# Patient Record
Sex: MALE | Race: WHITE | NOT HISPANIC OR LATINO | Employment: FULL TIME | URBAN - METROPOLITAN AREA
[De-identification: names, ages, dates, MRNs, and addresses within clinical notes are randomized per-mention and may not be internally consistent; named-entity substitution may affect disease eponyms.]

---

## 2017-09-26 ENCOUNTER — OFFICE VISIT (OUTPATIENT)
Dept: URGENT CARE | Facility: CLINIC | Age: 42
End: 2017-09-26
Payer: MEDICAID

## 2017-09-26 VITALS
HEIGHT: 68 IN | BODY MASS INDEX: 47.74 KG/M2 | HEART RATE: 82 BPM | SYSTOLIC BLOOD PRESSURE: 119 MMHG | DIASTOLIC BLOOD PRESSURE: 73 MMHG | RESPIRATION RATE: 12 BRPM | TEMPERATURE: 99 F | OXYGEN SATURATION: 96 % | WEIGHT: 315 LBS

## 2017-09-26 DIAGNOSIS — J01.40 ACUTE PANSINUSITIS, RECURRENCE NOT SPECIFIED: ICD-10-CM

## 2017-09-26 DIAGNOSIS — S46.911A RIGHT SHOULDER STRAIN, INITIAL ENCOUNTER: Primary | ICD-10-CM

## 2017-09-26 PROCEDURE — 99214 OFFICE O/P EST MOD 30 MIN: CPT | Mod: S$GLB,,, | Performed by: FAMILY MEDICINE

## 2017-09-26 PROCEDURE — 3008F BODY MASS INDEX DOCD: CPT | Mod: S$GLB,,, | Performed by: FAMILY MEDICINE

## 2017-09-26 RX ORDER — CETIRIZINE HYDROCHLORIDE 10 MG/1
10 TABLET ORAL DAILY
Qty: 30 TABLET | Refills: 2 | Status: SHIPPED | OUTPATIENT
Start: 2017-09-26 | End: 2017-11-08

## 2017-09-26 RX ORDER — AMOXICILLIN 875 MG/1
875 TABLET, FILM COATED ORAL EVERY 12 HOURS
Qty: 20 TABLET | Refills: 0 | Status: SHIPPED | OUTPATIENT
Start: 2017-09-26 | End: 2017-10-06

## 2017-09-26 RX ORDER — NAPROXEN 500 MG/1
500 TABLET ORAL 2 TIMES DAILY WITH MEALS
Qty: 60 TABLET | Refills: 2 | Status: SHIPPED | OUTPATIENT
Start: 2017-09-26 | End: 2017-11-08

## 2017-09-26 RX ORDER — TIZANIDINE 4 MG/1
4 TABLET ORAL NIGHTLY PRN
Qty: 10 TABLET | Refills: 0 | Status: SHIPPED | OUTPATIENT
Start: 2017-09-26 | End: 2017-10-06

## 2017-09-26 RX ORDER — DEXAMETHASONE SODIUM PHOSPHATE 100 MG/10ML
10 INJECTION INTRAMUSCULAR; INTRAVENOUS ONCE
Status: COMPLETED | OUTPATIENT
Start: 2017-09-26 | End: 2017-09-26

## 2017-09-26 RX ADMIN — DEXAMETHASONE SODIUM PHOSPHATE 10 MG: 100 INJECTION INTRAMUSCULAR; INTRAVENOUS at 07:09

## 2017-09-26 NOTE — PROGRESS NOTES
"Subjective:       Patient ID: Kehinde Soriano is a 41 y.o. male.    Vitals:  height is 5' 8" (1.727 m) and weight is 149.7 kg (330 lb) (abnormal). His tympanic temperature is 98.6 °F (37 °C). His blood pressure is 119/73 and his pulse is 82. His respiration is 12 and oxygen saturation is 96%.     Chief Complaint: URI and Shoulder Injury    URI    This is a new problem. The current episode started today. The problem has been gradually worsening. There has been no fever. Associated symptoms include congestion, coughing, rhinorrhea and a sore throat. Pertinent negatives include no abdominal pain, chest pain, diarrhea, ear pain, headaches, nausea, plugged ear sensation, sinus pain, sneezing, vomiting or wheezing. He has tried nothing for the symptoms.   Shoulder Injury    The incident occurred at home. The right shoulder is affected. The incident occurred 12 to 24 hours ago. The injury mechanism was a fall. The quality of the pain is described as stabbing. The pain radiates to the right arm. The pain is at a severity of 7/10. The pain is moderate. Pertinent negatives include no chest pain, muscle weakness, numbness or tingling. The symptoms are aggravated by overhead lifting and movement. He has tried NSAIDs for the symptoms. The treatment provided no relief.     Review of Systems   Constitution: Positive for malaise/fatigue. Negative for chills and fever.   HENT: Positive for congestion, rhinorrhea and sore throat. Negative for ear pain, hoarse voice, sinus pain and sneezing.    Eyes: Negative for discharge and redness.   Cardiovascular: Negative for chest pain, dyspnea on exertion and leg swelling.   Respiratory: Positive for cough and sputum production. Negative for shortness of breath and wheezing.    Musculoskeletal: Negative for myalgias.   Gastrointestinal: Negative for abdominal pain, diarrhea, nausea and vomiting.   Neurological: Negative for headaches, numbness and tingling.   All other systems reviewed and " are negative.      Objective:      Physical Exam   Constitutional: He is oriented to person, place, and time. He appears well-developed and well-nourished.   HENT:   Head: Normocephalic and atraumatic.   Nose: Mucosal edema, rhinorrhea and sinus tenderness present. Right sinus exhibits maxillary sinus tenderness and frontal sinus tenderness. Left sinus exhibits maxillary sinus tenderness and frontal sinus tenderness.   Mouth/Throat: Oropharyngeal exudate, posterior oropharyngeal edema and posterior oropharyngeal erythema present.   Eyes: Conjunctivae and EOM are normal. Pupils are equal, round, and reactive to light.   Neck: Normal range of motion.   Cardiovascular: Normal rate, regular rhythm and normal heart sounds.    Pulmonary/Chest: Effort normal and breath sounds normal. He has no wheezes. He has no rales.   Musculoskeletal: He exhibits tenderness.        Right shoulder: He exhibits decreased range of motion, tenderness, pain and decreased strength.        Arms:  Neurological: He is alert and oriented to person, place, and time.         XRAY:   No acute displaced fracture or dislocation identified.  Assessment:       1. Right shoulder strain, initial encounter    2. Acute pansinusitis, recurrence not specified        Plan:         Right shoulder strain, initial encounter  -     X-Ray Shoulder Trauma 3 view Right; Future; Expected date: 09/26/2017  -     naproxen (NAPROSYN) 500 MG tablet; Take 1 tablet (500 mg total) by mouth 2 (two) times daily with meals.  Dispense: 60 tablet; Refill: 2  -     tizanidine (ZANAFLEX) 4 MG tablet; Take 1 tablet (4 mg total) by mouth nightly as needed.  Dispense: 10 tablet; Refill: 0    Acute pansinusitis, recurrence not specified  -     dexamethasone injection 10 mg; Inject 1 mL (10 mg total) into the muscle once.  -     amoxicillin (AMOXIL) 875 MG tablet; Take 1 tablet (875 mg total) by mouth every 12 (twelve) hours.  Dispense: 20 tablet; Refill: 0  -     cetirizine (ZYRTEC) 10  MG tablet; Take 1 tablet (10 mg total) by mouth once daily.  Dispense: 30 tablet; Refill: 2

## 2017-09-27 NOTE — PATIENT INSTRUCTIONS
Sinusitis (Antibiotic Treatment)    The sinuses are air-filled spaces within the bones of the face. They connect to the inside of the nose. Sinusitis is an inflammation of the tissue lining the sinus cavity. Sinus inflammation can occur during a cold. It can also be due to allergies to pollens and other particles in the air. Sinusitis can cause symptoms of sinus congestion and fullness. A sinus infection causes fever, headache and facial pain. There is often green or yellow drainage from the nose or into the back of the throat (post-nasal drip). You have been given antibiotics to treat this condition.  Home care:  · Take the full course of antibiotics as instructed. Do not stop taking them, even if you feel better.  · Drink plenty of water, hot tea, and other liquids. This may help thin mucus. It also may promote sinus drainage.  · Heat may help soothe painful areas of the face. Use a towel soaked in hot water. Or,  the shower and direct the hot spray onto your face. Using a vaporizer along with a menthol rub at night may also help.   · An expectorant containing guaifenesin may help thin the mucus and promote drainage from the sinuses.  · Over-the-counter decongestants may be used unless a similar medicine was prescribed. Nasal sprays work the fastest. Use one that contains phenylephrine or oxymetazoline. First blow the nose gently. Then use the spray. Do not use these medicines more often than directed on the label or symptoms may get worse. You may also use tablets containing pseudoephedrine. Avoid products that combine ingredients, because side effects may be increased. Read labels. You can also ask the pharmacist for help. (NOTE: Persons with high blood pressure should not use decongestants. They can raise blood pressure.)  · Over-the-counter antihistamines may help if allergies contributed to your sinusitis.    · Do not use nasal rinses or irrigation during an acute sinus infection, unless told to by  your health care provider. Rinsing may spread the infection to other sinuses.  · Use acetaminophen or ibuprofen to control pain, unless another pain medicine was prescribed. (If you have chronic liver or kidney disease or ever had a stomach ulcer, talk with your doctor before using these medicines. Aspirin should never be used in anyone under 18 years of age who is ill with a fever. It may cause severe liver damage.)  · Don't smoke. This can worsen symptoms.  Follow-up care  Follow up with your healthcare provider or our staff if you are not improving within the next week.  When to seek medical advice  Call your healthcare provider if any of these occur:  · Facial pain or headache becoming more severe  · Stiff neck  · Unusual drowsiness or confusion  · Swelling of the forehead or eyelids  · Vision problems, including blurred or double vision  · Fever of 100.4ºF (38ºC) or higher, or as directed by your healthcare provider  · Seizure  · Breathing problems  · Symptoms not resolving within 10 days  Date Last Reviewed: 4/13/2015  © 3726-5964 [employer]. [address] [place] [address]. All rights reserved. This information is not intended as a substitute for professional medical care. Always follow your healthcare professional's instructions.        Shoulder Sprain  A sprain is a stretching or tearing of the ligaments that hold a joint together. A sprain may take up to 8 weeks to fully heal, depending on how severe it is. Moderate to severe shoulder sprains are treated with a sling or shoulder immobilizer. Minor sprains can be treated without any special support.  Home care  The following guidelines will help you care for your injury at home:  · If a sling was given to you, leave it in place for the time advised by your healthcare provider. If you arent sure how long to wear it, ask for advice. If the sling becomes loose, adjust it so that your forearm is level with the ground. Your shoulder  should feel well supported.  · Put an ice pack on the injured area for 20 minutes every 1 to 2 hours the first day. You can make your own ice pack by putting ice cubes in a plastic bag. A bag of frozen peas or something similar works well too. Wrap the bag in a thin towel. Continue with ice packs 3 to 4 times a day for the next 2 to 3 days. Then use the pack as needed to ease pain and swelling.  · You may use acetaminophen or ibuprofen to control pain, unless another pain medicine was prescribed. If you have chronic liver or kidney disease, talk with your healthcare provider before using these medicines. Also talk with your provider if youve had a stomach ulcer or gastrointestinal bleeding.  · Shoulder joints become stiff if left in a sling for too long. You should start range of motion exercises about 7 to 10 days after the injury. Talk with your provider to find out what type of exercises to do and how soon to start.  Follow-up care  Follow up with your healthcare provider, or as advised.  Any X-rays you had today dont show any broken bones, breaks, or fractures. Sometimes fractures dont show up on the first X-ray. Bruises and sprains can sometimes hurt as much as a fracture. These injuries can take time to heal completely. If your symptoms dont improve or they get worse, talk with your provider. You may need a repeat X-ray or other treatments.  When to seek medical advice  Call your healthcare provider right away if any of these occur:  · Shoulder pain or swelling in your arm that gets worse  · Fingers become cold, blue, numb, or tingly  · Large amount of bruising of the shoulder or upper arm  · Fever or chills  Date Last Reviewed: 8/1/2016  © 6921-0407 [facility]. [address] [place] [address], [place] [address]. All rights reserved. This information is not intended as a substitute for professional medical care. Always follow your healthcare professional's instructions.

## 2017-11-08 ENCOUNTER — OFFICE VISIT (OUTPATIENT)
Dept: URGENT CARE | Facility: CLINIC | Age: 42
End: 2017-11-08
Payer: MEDICAID

## 2017-11-08 VITALS
OXYGEN SATURATION: 96 % | HEART RATE: 99 BPM | TEMPERATURE: 98 F | HEIGHT: 68 IN | WEIGHT: 315 LBS | SYSTOLIC BLOOD PRESSURE: 119 MMHG | DIASTOLIC BLOOD PRESSURE: 81 MMHG | BODY MASS INDEX: 47.74 KG/M2

## 2017-11-08 DIAGNOSIS — R09.81 SINUS CONGESTION: ICD-10-CM

## 2017-11-08 DIAGNOSIS — J06.9 UPPER RESPIRATORY TRACT INFECTION, UNSPECIFIED TYPE: Primary | ICD-10-CM

## 2017-11-08 PROCEDURE — 99213 OFFICE O/P EST LOW 20 MIN: CPT | Mod: S$GLB,,, | Performed by: FAMILY MEDICINE

## 2017-11-08 RX ORDER — AZITHROMYCIN 250 MG/1
TABLET, FILM COATED ORAL
Qty: 6 TABLET | Refills: 0 | Status: SHIPPED | OUTPATIENT
Start: 2017-11-08 | End: 2018-05-06

## 2017-11-08 RX ORDER — DEXAMETHASONE SODIUM PHOSPHATE 100 MG/10ML
8 INJECTION INTRAMUSCULAR; INTRAVENOUS
Status: COMPLETED | OUTPATIENT
Start: 2017-11-08 | End: 2017-11-08

## 2017-11-08 RX ADMIN — DEXAMETHASONE SODIUM PHOSPHATE 8 MG: 100 INJECTION INTRAMUSCULAR; INTRAVENOUS at 07:11

## 2017-11-09 NOTE — PATIENT INSTRUCTIONS
Follow up with your doctor in a few days as needed.  Return to the urgent care or go to the ER if symptoms get worse.    Alexander Soriano MD

## 2017-11-09 NOTE — PROGRESS NOTES
"Subjective:       Patient ID: Kehinde Soriano is a 41 y.o. male.    Vitals:  height is 5' 8" (1.727 m) and weight is 149.7 kg (330 lb) (abnormal). His temperature is 97.9 °F (36.6 °C). His blood pressure is 119/81 and his pulse is 99. His oxygen saturation is 96%.     Chief Complaint: URI    URI    This is a new problem. The current episode started today. The problem has been gradually worsening. There has been no fever. Associated symptoms include congestion, coughing, ear pain, rhinorrhea and a sore throat. Pertinent negatives include no abdominal pain, chest pain, headaches, nausea or wheezing. He has tried nothing for the symptoms. The treatment provided no relief.     Review of Systems   Constitution: Positive for malaise/fatigue. Negative for chills and fever.   HENT: Positive for congestion, ear pain, hoarse voice, rhinorrhea and sore throat.    Eyes: Negative for discharge and redness.   Cardiovascular: Negative for chest pain, dyspnea on exertion and leg swelling.   Respiratory: Positive for cough and sputum production. Negative for shortness of breath and wheezing.    Musculoskeletal: Positive for myalgias.   Gastrointestinal: Negative for abdominal pain and nausea.   Neurological: Negative for headaches.   All other systems reviewed and are negative.      Objective:      Physical Exam   Constitutional: He is oriented to person, place, and time. He appears well-developed and well-nourished.   HENT:   Head: Normocephalic and atraumatic.   Right Ear: External ear normal.   Left Ear: External ear normal.   Eyes: EOM are normal. Pupils are equal, round, and reactive to light.   Neck: Normal range of motion. Neck supple. No JVD present. No tracheal deviation present. No thyromegaly present.   Cardiovascular: Normal rate, regular rhythm and normal heart sounds.  Exam reveals no gallop and no friction rub.    No murmur heard.  Pulmonary/Chest: Breath sounds normal. No respiratory distress. He has no wheezes. He " has no rales. He exhibits no tenderness.   Abdominal: Soft. Bowel sounds are normal. He exhibits no distension and no mass. There is no tenderness. There is no rebound and no guarding. No hernia.   Musculoskeletal: Normal range of motion. He exhibits no edema, tenderness or deformity.   Lymphadenopathy:     He has no cervical adenopathy.   Neurological: He is alert and oriented to person, place, and time. He displays normal reflexes. No cranial nerve deficit. He exhibits normal muscle tone. Coordination normal.   Skin: Skin is warm. Capillary refill takes less than 2 seconds. No rash noted. No erythema. No pallor.   Psychiatric: He has a normal mood and affect. His behavior is normal. Judgment and thought content normal.   Vitals reviewed.      Assessment:       1. Upper respiratory tract infection, unspecified type    2. Sinus congestion        Plan:         Upper respiratory tract infection, unspecified type    Sinus congestion  -     dexamethasone injection 8 mg; Inject 0.8 mLs (8 mg total) into the muscle one time.  -     azithromycin (Z-JJ) 250 MG tablet; Take 2 tablets by mouth x 1 for day 1 Then take 1 tablet by mouth daily for day 2 - 5  Dispense: 6 tablet; Refill: 0      Follow up with your doctor in a few days as needed.  Return to the urgent care or go to the ER if symptoms get worse.    Alexander Soriano MD

## 2017-11-15 ENCOUNTER — TELEPHONE (OUTPATIENT)
Dept: URGENT CARE | Facility: CLINIC | Age: 42
End: 2017-11-15

## 2018-01-18 ENCOUNTER — HOSPITAL ENCOUNTER (EMERGENCY)
Facility: OTHER | Age: 43
Discharge: HOME | End: 2018-01-18
Attending: EMERGENCY MEDICINE
Payer: MEDICAID

## 2018-01-18 VITALS
DIASTOLIC BLOOD PRESSURE: 67 MMHG | HEART RATE: 103 BPM | SYSTOLIC BLOOD PRESSURE: 114 MMHG | OXYGEN SATURATION: 96 % | RESPIRATION RATE: 18 BRPM | TEMPERATURE: 98 F

## 2018-01-18 DIAGNOSIS — J02.0 STREP PHARYNGITIS: Primary | ICD-10-CM

## 2018-01-18 LAB
CTP QC/QA: YES
S PYO RRNA THROAT QL PROBE: POSITIVE

## 2018-01-18 PROCEDURE — 25000003 PHARM REV CODE 250: Performed by: EMERGENCY MEDICINE

## 2018-01-18 PROCEDURE — 96372 THER/PROPH/DIAG INJ SC/IM: CPT

## 2018-01-18 PROCEDURE — 63600175 PHARM REV CODE 636 W HCPCS: Performed by: EMERGENCY MEDICINE

## 2018-01-18 PROCEDURE — 99283 EMERGENCY DEPT VISIT LOW MDM: CPT | Mod: 25

## 2018-01-18 PROCEDURE — 87880 STREP A ASSAY W/OPTIC: CPT

## 2018-01-18 RX ORDER — IBUPROFEN 800 MG/1
800 TABLET ORAL EVERY 6 HOURS PRN
Qty: 20 TABLET | Refills: 0 | Status: SHIPPED | OUTPATIENT
Start: 2018-01-18 | End: 2018-04-10

## 2018-01-18 RX ORDER — DEXAMETHASONE SODIUM PHOSPHATE 4 MG/ML
8 INJECTION, SOLUTION INTRA-ARTICULAR; INTRALESIONAL; INTRAMUSCULAR; INTRAVENOUS; SOFT TISSUE
Status: COMPLETED | OUTPATIENT
Start: 2018-01-18 | End: 2018-01-18

## 2018-01-18 RX ORDER — IBUPROFEN 400 MG/1
800 TABLET ORAL
Status: COMPLETED | OUTPATIENT
Start: 2018-01-18 | End: 2018-01-18

## 2018-01-18 RX ADMIN — DEXAMETHASONE SODIUM PHOSPHATE 8 MG: 4 INJECTION, SOLUTION INTRAMUSCULAR; INTRAVENOUS at 11:01

## 2018-01-18 RX ADMIN — IBUPROFEN 800 MG: 400 TABLET, FILM COATED ORAL at 10:01

## 2018-01-18 RX ADMIN — PENICILLIN G BENZATHINE 1.2 MILLION UNITS: 1200000 INJECTION, SUSPENSION INTRAMUSCULAR at 11:01

## 2018-01-18 NOTE — ED PROVIDER NOTES
Encounter Date: 1/18/2018       History     Chief Complaint   Patient presents with    Nasal Congestion     patient reports having nasal congestion, sinus headache and right ear painX1 day    Sinusitis    Otalgia     Chief complaint: Right ear and facial pain  42-year-old complains of pressure to the right side of his face.  Patient awoke this morning the pain to his right ear and his right throat.  No fever.  No coughing or shortness of breath.  Patient uses a sleep apnea machine.  Patient did not try anything over-the-counter prior to coming to the ER.  His pain is moderate.  There are no aggravating or alleviating factors for his symptoms.          Review of patient's allergies indicates:  No Known Allergies  Past Medical History:   Diagnosis Date    Diverticulitis of colon      Past Surgical History:   Procedure Laterality Date    ABDOMINAL SURGERY      COLON SURGERY      HERNIA REPAIR       Family History   Problem Relation Age of Onset    Family history unknown: Yes     Social History   Substance Use Topics    Smoking status: Current Every Day Smoker     Packs/day: 0.50     Years: 10.00     Types: Cigarettes    Smokeless tobacco: Never Used    Alcohol use Yes      Comment: ocassionally     Review of Systems   Constitutional: Negative for fever.   HENT: Positive for congestion, ear pain and sore throat.    Eyes: Negative for visual disturbance.   Respiratory: Negative for shortness of breath.    Cardiovascular: Negative for chest pain.   Gastrointestinal: Negative for nausea.   Genitourinary: Negative for dysuria.   Musculoskeletal: Negative for back pain.   Skin: Negative for rash.   Neurological: Negative for weakness.       Physical Exam     Initial Vitals [01/18/18 0947]   BP Pulse Resp Temp SpO2   114/67 103 18 97.9 °F (36.6 °C) 96 %      MAP       82.67         Physical Exam    Constitutional: He appears well-developed and well-nourished.   HENT:   Head: Normocephalic and atraumatic.   Right  Ear: Tympanic membrane is injected.   Mouth/Throat: Posterior oropharyngeal edema and posterior oropharyngeal erythema present. No tonsillar abscesses.   Eyes: EOM are normal. Pupils are equal, round, and reactive to light.   Neck: Neck supple.   Cardiovascular: Normal rate, regular rhythm and normal heart sounds.   Pulmonary/Chest: Breath sounds normal.   Abdominal: Soft. There is no tenderness. There is no rebound and no guarding.   Musculoskeletal: Normal range of motion.   Lymphadenopathy:        Head (right side): Submandibular adenopathy present.        Head (left side): Submandibular adenopathy present.   Neurological: He is alert and oriented to person, place, and time. No cranial nerve deficit.   Skin: Skin is warm and dry.   Psychiatric: He has a normal mood and affect.         ED Course   Procedures  Labs Reviewed   POCT RAPID STREP A             Medical Decision Making:   Initial Assessment:   42-year-old who complains of pain and pressure to the right side of his face since awakening this morning.  On exam patient has mild erythema to his tympanic membrane as well as to his posterior pharynx  ED Management:  Patient will be checked for strep,  Patient was positive for strep pharyngitis.  He was given Decadron and Bicillin in the ED and will be discharged on ibuprofen.                   ED Course      Clinical Impression:   The encounter diagnosis was Strep pharyngitis.                           Cecy Champion MD  01/18/18 [id number]

## 2018-03-11 ENCOUNTER — HOSPITAL ENCOUNTER (EMERGENCY)
Facility: OTHER | Age: 43
Discharge: HOME | End: 2018-03-11
Attending: EMERGENCY MEDICINE
Payer: MEDICAID

## 2018-03-11 VITALS
RESPIRATION RATE: 20 BRPM | TEMPERATURE: 99 F | HEIGHT: 68 IN | HEART RATE: 93 BPM | DIASTOLIC BLOOD PRESSURE: 83 MMHG | WEIGHT: 315 LBS | BODY MASS INDEX: 47.74 KG/M2 | SYSTOLIC BLOOD PRESSURE: 127 MMHG | OXYGEN SATURATION: 97 %

## 2018-03-11 DIAGNOSIS — R03.0 ELEVATED BLOOD PRESSURE READING WITHOUT DIAGNOSIS OF HYPERTENSION: ICD-10-CM

## 2018-03-11 DIAGNOSIS — S39.012A LUMBAR STRAIN, INITIAL ENCOUNTER: Primary | ICD-10-CM

## 2018-03-11 PROCEDURE — 63600175 PHARM REV CODE 636 W HCPCS: Performed by: NURSE PRACTITIONER

## 2018-03-11 PROCEDURE — 96372 THER/PROPH/DIAG INJ SC/IM: CPT

## 2018-03-11 PROCEDURE — 25000003 PHARM REV CODE 250: Performed by: NURSE PRACTITIONER

## 2018-03-11 PROCEDURE — 99284 EMERGENCY DEPT VISIT MOD MDM: CPT | Mod: 25

## 2018-03-11 RX ORDER — ONDANSETRON 4 MG/1
4 TABLET, ORALLY DISINTEGRATING ORAL
Status: COMPLETED | OUTPATIENT
Start: 2018-03-11 | End: 2018-03-11

## 2018-03-11 RX ORDER — HYDROMORPHONE HYDROCHLORIDE 2 MG/ML
INJECTION, SOLUTION INTRAMUSCULAR; INTRAVENOUS; SUBCUTANEOUS
Status: DISCONTINUED
Start: 2018-03-11 | End: 2018-03-11 | Stop reason: HOSPADM

## 2018-03-11 RX ORDER — HYDROMORPHONE HYDROCHLORIDE 1 MG/ML
1 INJECTION, SOLUTION INTRAMUSCULAR; INTRAVENOUS; SUBCUTANEOUS
Status: COMPLETED | OUTPATIENT
Start: 2018-03-11 | End: 2018-03-11

## 2018-03-11 RX ORDER — DIAZEPAM 5 MG/1
5 TABLET ORAL
Status: COMPLETED | OUTPATIENT
Start: 2018-03-11 | End: 2018-03-11

## 2018-03-11 RX ORDER — ACETAMINOPHEN AND CODEINE PHOSPHATE 300; 30 MG/1; MG/1
1 TABLET ORAL EVERY 8 HOURS PRN
Qty: 12 TABLET | Refills: 0 | Status: SHIPPED | OUTPATIENT
Start: 2018-03-11 | End: 2019-03-16

## 2018-03-11 RX ORDER — KETOROLAC TROMETHAMINE 30 MG/ML
15 INJECTION, SOLUTION INTRAMUSCULAR; INTRAVENOUS
Status: COMPLETED | OUTPATIENT
Start: 2018-03-11 | End: 2018-03-11

## 2018-03-11 RX ORDER — DIAZEPAM 5 MG/1
5 TABLET ORAL EVERY 8 HOURS PRN
Qty: 15 TABLET | Refills: 0 | Status: SHIPPED | OUTPATIENT
Start: 2018-03-11 | End: 2019-03-16

## 2018-03-11 RX ORDER — ORPHENADRINE CITRATE 30 MG/ML
60 INJECTION INTRAMUSCULAR; INTRAVENOUS
Status: COMPLETED | OUTPATIENT
Start: 2018-03-11 | End: 2018-03-11

## 2018-03-11 RX ORDER — PREDNISONE 20 MG/1
40 TABLET ORAL DAILY
Qty: 10 TABLET | Refills: 0 | Status: SHIPPED | OUTPATIENT
Start: 2018-03-11 | End: 2018-03-16

## 2018-03-11 RX ADMIN — KETOROLAC TROMETHAMINE 15 MG: 30 INJECTION, SOLUTION INTRAMUSCULAR at 06:03

## 2018-03-11 RX ADMIN — HYDROMORPHONE HYDROCHLORIDE 1 MG: 1 INJECTION, SOLUTION INTRAMUSCULAR; INTRAVENOUS; SUBCUTANEOUS at 06:03

## 2018-03-11 RX ADMIN — DIAZEPAM 5 MG: 5 TABLET ORAL at 05:03

## 2018-03-11 RX ADMIN — ONDANSETRON 4 MG: 4 TABLET, ORALLY DISINTEGRATING ORAL at 05:03

## 2018-03-11 RX ADMIN — ORPHENADRINE CITRATE 60 MG: 30 INJECTION INTRAMUSCULAR; INTRAVENOUS at 07:03

## 2018-03-11 NOTE — ED NOTES
Pt reports pain to R side  After twisting in the shower today.  Reports excruciating pain at this time 10/10.  Denies fall or injury.  Denies abdominal or chest pain.

## 2018-03-11 NOTE — ED PROVIDER NOTES
"Encounter Date: 3/11/2018       History     Chief Complaint   Patient presents with    Back Pain     Pt states, " I was getting out of the shower and my back started hurting today."     The history is provided by the patient. No  was used.   Back Pain    This is a new problem. The current episode started 1 to 2 hours ago. The problem occurs constantly. The problem has been unchanged. The pain is associated with twisting (Patient was turning while getting out of the shower and he incurred pain to the right side of his back that does not radiate). The pain is present in the lumbar spine (Right-sided). The quality of the pain is described as aching. The pain does not radiate. The pain is at a severity of 10/10. The symptoms are aggravated by twisting and certain positions. The pain is the same all the time. Pertinent negatives include no chest pain, no fever, no numbness, no weight loss, no headaches, no abdominal pain, no abdominal swelling, no bowel incontinence, no perianal numbness, no bladder incontinence, no dysuria, no pelvic pain, no leg pain, no paresthesias, no paresis, no tingling and no weakness. He has tried nothing for the symptoms. Risk factors include obesity and a sedentary lifestyle.     Review of patient's allergies indicates:  No Known Allergies  Past Medical History:   Diagnosis Date    Diverticulitis of colon      Past Surgical History:   Procedure Laterality Date    ABDOMINAL SURGERY      COLON SURGERY      HERNIA REPAIR       Family History   Problem Relation Age of Onset    Family history unknown: Yes     Social History   Substance Use Topics    Smoking status: Current Every Day Smoker     Packs/day: 0.50     Years: 10.00     Types: Cigarettes    Smokeless tobacco: Never Used    Alcohol use Yes      Comment: ocassionally     Review of Systems   Constitutional: Negative.  Negative for chills, fever and weight loss.   HENT: Negative.  Negative for congestion, sinus " pressure and sore throat.    Eyes: Negative.  Negative for pain and discharge.   Respiratory: Negative.  Negative for cough.    Cardiovascular: Negative.  Negative for chest pain.   Gastrointestinal: Negative.  Negative for abdominal pain, bowel incontinence, constipation, diarrhea, nausea, rectal pain and vomiting.   Genitourinary: Negative.  Negative for bladder incontinence, dysuria, genital sores, pelvic pain, penile pain, scrotal swelling and testicular pain.   Musculoskeletal: Positive for back pain. Negative for gait problem, joint swelling and neck pain.   Skin: Negative.  Negative for color change and rash.   Allergic/Immunologic: Negative.    Neurological: Negative.  Negative for tingling, weakness, numbness, headaches and paresthesias.   Psychiatric/Behavioral: Negative.  Negative for behavioral problems, self-injury and suicidal ideas. The patient is not hyperactive.    All other systems reviewed and are negative.      Physical Exam     Initial Vitals [03/11/18 1659]   BP Pulse Resp Temp SpO2   (!) 170/111 96 16 98 °F (36.7 °C) 99 %      MAP       130.67         Physical Exam    Nursing note and vitals reviewed.  Constitutional: He appears well-developed and well-nourished. He is not diaphoretic.  Non-toxic appearance. He does not appear ill. No distress.   obese   HENT:   Head: Normocephalic and atraumatic.   Eyes: Conjunctivae are normal.   Neck: Normal range of motion.   Cardiovascular: Normal rate, regular rhythm, normal heart sounds and intact distal pulses. Exam reveals no gallop and no friction rub.    No murmur heard.  Pulmonary/Chest: Breath sounds normal. No respiratory distress. He has no wheezes. He has no rhonchi. He has no rales. He exhibits no tenderness.   Abdominal: Soft. Bowel sounds are normal. He exhibits no distension and no mass. There is no tenderness. There is no rebound and no guarding.   Musculoskeletal: Normal range of motion.   5/5 motor strength BLE with intact  sensation  Negative SLR BLE  Normal heel/toe BLE  2+ reflexes BLE  No bony tenderness; Spasm noted  No s/s cauda equina     Neurological: He is alert and oriented to person, place, and time.   Skin: Skin is warm and dry. Capillary refill takes less than 2 seconds. No rash noted.   Psychiatric: He has a normal mood and affect. His behavior is normal. Judgment and thought content normal.         ED Course   Procedures  Labs Reviewed - No data to display     Imaging Results          CT Renal Stone Study ABD Pelvis WO (Final result)  Result time 03/11/18 19:56:48    Final result by Anu Neal MD (03/11/18 19:56:48)                 Impression:      1.  Hepatic steatosis, correlation with LFTs recommended.    2.  Postsurgical changes of the large bowel without obstruction.    3.  Several additional findings above.        Electronically signed by: ANU NEAL MD  Date:     03/11/18  Time:    19:56              Narrative:    Clinical History:     Technique: Axial images of the abdomen and pelvis were obtained at 5-mm intervals without the administration of contrast following the renal stone study protocol.    Comparison:Flank pain    Findings:None    Images of the lower thorax are remarkable for a calcified granuloma within the right lower lobe, punctate.    The liver is diffusely hypoattenuating suggesting steatosis, correlation with LFTs recommended.  There is sparing about the gallbladder fossa.  The spleen, pancreas and right adrenal gland are grossly unremarkable.  There is nonspecific low attenuating thickening of the left adrenal gland.  The gallbladder is decompressed limiting evaluation.  There is no biliary dilation or ascites.  There is a minimal hiatal hernia.  Scattered shotty periaortic and paracaval lymph nodes are noted.    There is no hydronephrosis or nephrolithiasis.  There is cortical scarring involving the interpole region of the left kidney versus congenital partial fusion.  There is no  hydronephrosis or nephrolithiasis.  The bilateral ureters are unremarkable without calculi seen.  The urinary bladder is unremarkable.  The prostate is not enlarged.    Postsurgical changes are noted of partial colectomy, anastomosis is patent.  The terminal ileum is unremarkable.  The appendix is not confidently identified, no pericecal inflammation.  The small bowel is grossly unremarkable noting postsurgical changes abutting the anterior abdominal wall on the left, with adjacent small bowel loops in the region.  No focal organized pelvic fluid collection.    No focal osseous destructive process.    No significant inguinal lymphadenopathy.  There are a few scattered nonenlarged mesenteric lymph nodes.  Postsurgical changes are noted of the anterior abdominal wall.  There is a small right abdominal wall hernia, containing fat, without inflammation.                             CT Lumbar Spine Without Contrast (Final result)  Result time 03/11/18 20:13:46    Final result by Jr Parker MD (03/11/18 20:13:46)                 Impression:       1.  No evidence of acute fracture or listhesis of the lumbar spine.    2. Mild multilevel degenerative lumbar spine.  Outpatient MRI lumbar spine may be obtained for assessment.    3.  Postoperative changes in the sigmoid colon.                  Electronically signed by: JR PARKER MD  Date:     03/11/18  Time:    20:13              Narrative:    Exam: [id number]  03/11/18  19:31:16 [id number] ([facility]) : CT LUMBAR SPINE WITHOUT CONTRAST    Technique:    Axial CT scan of the lumbar spine was performed without intravenous contrast. Coronal and Sagittal Reformats were also obtained.     Comparison:    CT scan of the abdomen and pelvis dated 03/11/2018    Findings:      There is straightening of the normal lumbar lordosis.  There are 5 lumbar-type vertebral bodies.  There is a Schmorl's node along the superior endplate of L1.  The reminder of the vertebral body heights are maintained.   There is hypertrophy of the posterior elements.  There is disc desiccation at the T12-L1, L4-5, and L5-S1 levels.  The reminder of intervertebral disc spaces are unremarkable.  There are anterior osteophytosis at the T. 11-T12 and T12-L1 levels.  The sacroiliac joints are within normal limits.  There is no evidence of acute fracture or listhesis.  Evaluation of individual disc levels reveals mild spinal canal and neural foraminal narrowing.  No evidence of mass effect identified in the spinal canal.    The paraspinal soft tissues are within normal limits.  There are postoperative changes in the sigmoid colon.  No evidence of lymphadenopathy in the retroperitoneum.                                 Medical Decision Making:   Initial Assessment:   Lumbar strain  Differential Diagnosis:   Lumbar radiculopathy, kidney stone, spinal abnormality, retroperitoneal abnormality, pyelo  Clinical Tests:   Radiological Study: Ordered and Reviewed  ED Management:  Dilaudid IM and Valium by mouth given in the ER.  Patient's repeat blood pressure is 127/83.  Patient was also given Toradol IM.  Upon reassessment patient's pain is down to a 6 out of 10.  Patient will be discharged home on Valium, Tylenol 3 and prednisone with instructions to rest, refrain from strenuous activity, use over-the-counter icy hot as needed, use over-the-counter heating pad as needed, follow-up with his primary care provider in 1-2 days for MRI referral per radiologist recommendations, and return to the ER as needed if symptoms worsen or fail to improve.  Patient verbalized an understanding of discharge instructions and treatment plan.                      Clinical Impression:   The primary encounter diagnosis was Lumbar strain, initial encounter. A diagnosis of Elevated blood pressure reading without diagnosis of hypertension was also pertinent to this visit.                           Toussaint Battley III, FNP  03/11/18 2027

## 2018-03-15 ENCOUNTER — OFFICE VISIT (OUTPATIENT)
Dept: URGENT CARE | Facility: CLINIC | Age: 43
End: 2018-03-15
Payer: MEDICAID

## 2018-03-15 VITALS
DIASTOLIC BLOOD PRESSURE: 84 MMHG | SYSTOLIC BLOOD PRESSURE: 130 MMHG | HEART RATE: 100 BPM | BODY MASS INDEX: 50.18 KG/M2 | WEIGHT: 315 LBS | TEMPERATURE: 98 F | OXYGEN SATURATION: 98 %

## 2018-03-15 DIAGNOSIS — R73.9 HYPERGLYCEMIA: ICD-10-CM

## 2018-03-15 DIAGNOSIS — E11.9 DIABETES MELLITUS, NEW ONSET: Primary | ICD-10-CM

## 2018-03-15 DIAGNOSIS — Z76.89 ENCOUNTER TO ESTABLISH CARE: ICD-10-CM

## 2018-03-15 LAB
BILIRUB UR QL STRIP: NEGATIVE
GLUCOSE SERPL-MCNC: 257 MG/DL (ref 70–110)
GLUCOSE UR QL STRIP: POSITIVE
KETONES UR QL STRIP: POSITIVE
LEUKOCYTE ESTERASE UR QL STRIP: NEGATIVE
PH, POC UA: 6 (ref 5–8)
POC BLOOD, URINE: NEGATIVE
POC NITRATES, URINE: NEGATIVE
PROT UR QL STRIP: NEGATIVE
SP GR UR STRIP: 1.01 (ref 1–1.03)
UROBILINOGEN UR STRIP-ACNC: ABNORMAL (ref 0.3–2.2)

## 2018-03-15 PROCEDURE — 99214 OFFICE O/P EST MOD 30 MIN: CPT | Mod: 25,S$GLB,, | Performed by: NURSE PRACTITIONER

## 2018-03-15 PROCEDURE — 81003 URINALYSIS AUTO W/O SCOPE: CPT | Mod: QW,S$GLB,, | Performed by: NURSE PRACTITIONER

## 2018-03-15 PROCEDURE — 82948 REAGENT STRIP/BLOOD GLUCOSE: CPT | Mod: S$GLB,,, | Performed by: NURSE PRACTITIONER

## 2018-03-15 RX ORDER — METFORMIN HYDROCHLORIDE 500 MG/1
1000 TABLET ORAL 2 TIMES DAILY WITH MEALS
COMMUNITY
End: 2018-11-17 | Stop reason: SDUPTHER

## 2018-03-15 NOTE — PATIENT INSTRUCTIONS
Diet: Diabetes  Food is an important tool that you can use to control diabetes and stay healthy. Eating well-balanced meals in the correct amounts will help you control your blood glucose levels and prevent low blood sugar reactions. It will also help you reduce the health risks of diabetes. There is no one specific diet that is right for everyone with diabetes. But there are general guidelines to follow. A registered dietitian (RD) will create a tailored diet approach thats just right for you. He or she will also help you plan healthy meals and snacks. If you have any questions, call your dietitian for advice.     Guidelines for success  Talk with your healthcare provider before starting a diabetes diet or weight loss program. If you haven't talked with a dietitian yet, ask your provider for a referral. The following guidelines can help you succeed:  · Select foods from the 6 food groups below. Your dietitian will help you find food choices within each group. He or she will also show you serving sizes and how many servings you can have at each meal.  ¨ Grains, beans, and starchy vegetables  ¨ Vegetables  ¨ Fruit  ¨ Milk or yogurt  ¨ Meat, poultry, fish, or tofu  ¨ Healthy fats  · Check your blood sugar levels as directed by your provider. Take any medicine as prescribed by your provider.  · Learn to read food labels and pick the right portion sizes.  · Eat only the amount of food in your meal plan. Eat about the same amount of food at regular times each day. Dont skip meals. Eat meals 4 to 5 hours apart, with snacks in between.  · Limit alcohol. It raises blood sugar levels. Drink water or calorie-free diet drinks that use safe sweeteners.  · Eat less fat to help lower your risk of heart disease. Use nonfat or low-fat dairy products and lean meats. Avoid fried foods. Use cooking oils that are unsaturated, such as olive, canola, or peanut oil.  · Talk with your dietitian about safe sugar substitutes.  · Avoid  added salt. It can contribute to high blood pressure, which can cause heart disease. People with diabetes already have a risk of high blood pressure and heart disease.  · Stay at a healthy weight. If you need to lose weight, cut down on your portion sizes. But dont skip meals. Exercise is an important part of any weight management program. Talk with your provider about an exercise program thats right for you.  · For more information about the best diet plan for you, talk with a registered dietitian (RD). To find an RD in your area, contact:  ¨ Academy of Nutrition and Dietetics www.eatright.org  ¨ The American Diabetes Association 491-972-5272 www.diabetes.org  Date Last Reviewed: 8/1/2016 © 2000-2017 [employer] [address] [place] [address], [place] [address]. All rights reserved. This information is not intended as a substitute for professional medical care. Always follow your healthcare professional's instructions.    Please arrange follow up with your primary medical clinic as soon as possible. You must understand that you've received an Urgent Care treatment only and that you may be released before all of your medical problems are known or treated. You, the patient, will arrange for follow up as instructed. If your symptoms worsen or fail to improve you should go to the Emergency Room.

## 2018-03-15 NOTE — PROGRESS NOTES
Subjective:       Patient ID: Kehinde Soriano is a 42 y.o. male.    Vitals:  weight is 149.7 kg (330 lb) (abnormal). His oral temperature is 98.4 °F (36.9 °C). His blood pressure is 130/84 and his pulse is 100. His oxygen saturation is 98%.     Chief Complaint: Blood Sugar Problem    Pt recently dx with DM in ER and started on 500mg Metformin BID, was instructed to check blood sugar daily and had no monitor at home. Pt came here for blood sugar check, no complaints. Pt states he contacted his PCP for appt, explained situation of new onset DM, was told by PCP office that they cannot make an exception for him or they would have to make exceptions for everyone, no avail appts til June 2018. Pt requesting to est care with PCP for earlier appt and management of DM.       Diabetes   Hypoglycemia symptoms include sleepiness. Pertinent negatives for hypoglycemia include no headaches or nervousness/anxiousness. Associated symptoms include fatigue. Pertinent negatives for diabetes include no blurred vision, no chest pain, no visual change and no weakness. There are no hypoglycemic complications. Symptoms are stable. There are no diabetic complications. Current diabetic treatments: metformin. He is following a generally unhealthy diet. He has not had a previous visit with a dietitian. He never participates in exercise. He does not see a podiatrist.Eye exam is current.     Review of Systems   Constitution: Positive for fatigue. Negative for chills, fever and weakness.   HENT: Negative for sore throat.    Eyes: Negative for blurred vision.   Cardiovascular: Negative for chest pain.   Respiratory: Negative for shortness of breath.    Skin: Negative for rash.   Musculoskeletal: Negative for back pain and joint pain.   Gastrointestinal: Negative for abdominal pain, diarrhea, nausea and vomiting.   Neurological: Negative for headaches.   Psychiatric/Behavioral: The patient is not nervous/anxious.    All other systems reviewed and  are negative.      Objective:      Physical Exam   Constitutional: He is oriented to person, place, and time. He appears well-developed and well-nourished. He is cooperative.  Non-toxic appearance. He does not appear ill. No distress.   HENT:   Head: Normocephalic and atraumatic.   Right Ear: Hearing, tympanic membrane, external ear and ear canal normal.   Left Ear: Hearing, tympanic membrane, external ear and ear canal normal.   Nose: Nose normal. No mucosal edema, rhinorrhea or nasal deformity. No epistaxis. Right sinus exhibits no maxillary sinus tenderness and no frontal sinus tenderness. Left sinus exhibits no maxillary sinus tenderness and no frontal sinus tenderness.   Mouth/Throat: Uvula is midline, oropharynx is clear and moist and mucous membranes are normal. No trismus in the jaw. Normal dentition. No uvula swelling. No posterior oropharyngeal erythema.   Eyes: Conjunctivae and lids are normal. Right eye exhibits no discharge. Left eye exhibits no discharge. No scleral icterus.   Sclera clear bilat   Neck: Trachea normal, normal range of motion, full passive range of motion without pain and phonation normal. Neck supple.   Cardiovascular: Normal rate, regular rhythm, normal heart sounds, intact distal pulses and normal pulses.    Pulmonary/Chest: Effort normal and breath sounds normal. No respiratory distress.   Abdominal: Soft. Normal appearance and bowel sounds are normal. He exhibits no distension, no pulsatile midline mass and no mass. There is no tenderness.   Musculoskeletal: Normal range of motion. He exhibits no edema or deformity.   Neurological: He is alert and oriented to person, place, and time. He exhibits normal muscle tone. Coordination normal.   Skin: Skin is warm, dry and intact. He is not diaphoretic. No pallor.   Psychiatric: He has a normal mood and affect. His speech is normal and behavior is normal. Judgment and thought content normal. Cognition and memory are normal.   Nursing note  and vitals reviewed.      Assessment:       1. Diabetes mellitus, new onset    2. Hyperglycemia    3. Encounter to establish care        Plan:     Follow up with PCP, instructed on Diabetic diet changes.   Go to ER for worsening symptoms.   Diabetes mellitus, new onset  -     Ambulatory referral to Internal Medicine    Hyperglycemia  -     Cancel: POCT glucose  -     POCT Urinalysis, Dipstick, Automated, W/O Scope  -     Cancel: POCT Urinalysis, Dipstick, Automated, W/O Scope  -     POCT glucose  -     Ambulatory referral to Internal Medicine    Encounter to establish care  -     Ambulatory referral to Internal Medicine

## 2018-04-10 ENCOUNTER — HOSPITAL ENCOUNTER (EMERGENCY)
Facility: OTHER | Age: 43
Discharge: HOME | End: 2018-04-10
Attending: EMERGENCY MEDICINE
Payer: MEDICAID

## 2018-04-10 VITALS
SYSTOLIC BLOOD PRESSURE: 128 MMHG | BODY MASS INDEX: 47.74 KG/M2 | WEIGHT: 315 LBS | DIASTOLIC BLOOD PRESSURE: 84 MMHG | TEMPERATURE: 97 F | HEART RATE: 82 BPM | OXYGEN SATURATION: 97 % | HEIGHT: 68 IN | RESPIRATION RATE: 18 BRPM

## 2018-04-10 DIAGNOSIS — R51.9 NONINTRACTABLE HEADACHE, UNSPECIFIED CHRONICITY PATTERN, UNSPECIFIED HEADACHE TYPE: Primary | ICD-10-CM

## 2018-04-10 DIAGNOSIS — I10 HTN (HYPERTENSION): ICD-10-CM

## 2018-04-10 LAB
ALBUMIN SERPL-MCNC: 4 G/DL (ref 3.3–5.5)
ALP SERPL-CCNC: 97 U/L (ref 42–141)
BILIRUB SERPL-MCNC: 0.6 MG/DL (ref 0.2–1.6)
BUN SERPL-MCNC: 11 MG/DL (ref 7–22)
CALCIUM SERPL-MCNC: 9.6 MG/DL (ref 8–10.3)
CHLORIDE SERPL-SCNC: 101 MMOL/L (ref 98–108)
CREAT SERPL-MCNC: 0.8 MG/DL (ref 0.6–1.2)
GLUCOSE SERPL-MCNC: 187 MG/DL (ref 73–118)
POC ALT (SGPT): 57 U/L (ref 10–47)
POC AST (SGOT): 40 U/L (ref 11–38)
POC CARDIAC TROPONIN I: 0 NG/ML
POC PTINR: 1 (ref 0.9–1.2)
POC PTWBT: 11.7 SEC (ref 9.7–14.3)
POC TCO2: 24 MMOL/L (ref 18–33)
POCT GLUCOSE: 185 MG/DL (ref 70–110)
POTASSIUM BLD-SCNC: 4.1 MMOL/L (ref 3.6–5.1)
PROTEIN, POC: 7.8 G/DL (ref 6.4–8.1)
SAMPLE: NORMAL
SAMPLE: NORMAL
SODIUM BLD-SCNC: 138 MMOL/L (ref 128–145)

## 2018-04-10 PROCEDURE — 99285 EMERGENCY DEPT VISIT HI MDM: CPT | Mod: 25

## 2018-04-10 PROCEDURE — 96375 TX/PRO/DX INJ NEW DRUG ADDON: CPT

## 2018-04-10 PROCEDURE — 96372 THER/PROPH/DIAG INJ SC/IM: CPT | Mod: 59

## 2018-04-10 PROCEDURE — 93005 ELECTROCARDIOGRAM TRACING: CPT

## 2018-04-10 PROCEDURE — 84484 ASSAY OF TROPONIN QUANT: CPT

## 2018-04-10 PROCEDURE — 80053 COMPREHEN METABOLIC PANEL: CPT

## 2018-04-10 PROCEDURE — 63600175 PHARM REV CODE 636 W HCPCS: Performed by: EMERGENCY MEDICINE

## 2018-04-10 PROCEDURE — 85610 PROTHROMBIN TIME: CPT

## 2018-04-10 PROCEDURE — 85025 COMPLETE CBC W/AUTO DIFF WBC: CPT

## 2018-04-10 PROCEDURE — 96374 THER/PROPH/DIAG INJ IV PUSH: CPT

## 2018-04-10 RX ORDER — DIPHENHYDRAMINE HYDROCHLORIDE 50 MG/ML
25 INJECTION INTRAMUSCULAR; INTRAVENOUS
Status: COMPLETED | OUTPATIENT
Start: 2018-04-10 | End: 2018-04-10

## 2018-04-10 RX ORDER — HYDROCHLOROTHIAZIDE 25 MG/1
12.5 TABLET ORAL DAILY
Qty: 30 TABLET | Refills: 0 | Status: SHIPPED | OUTPATIENT
Start: 2018-04-10 | End: 2019-03-16

## 2018-04-10 RX ORDER — KETOROLAC TROMETHAMINE 30 MG/ML
15 INJECTION, SOLUTION INTRAMUSCULAR; INTRAVENOUS
Status: COMPLETED | OUTPATIENT
Start: 2018-04-10 | End: 2018-04-10

## 2018-04-10 RX ORDER — PROMETHAZINE HYDROCHLORIDE 25 MG/1
25 TABLET ORAL EVERY 6 HOURS PRN
Qty: 15 TABLET | Refills: 0 | Status: SHIPPED | OUTPATIENT
Start: 2018-04-10 | End: 2020-11-04 | Stop reason: ALTCHOICE

## 2018-04-10 RX ORDER — PROCHLORPERAZINE EDISYLATE 5 MG/ML
10 INJECTION INTRAMUSCULAR; INTRAVENOUS ONCE
Status: COMPLETED | OUTPATIENT
Start: 2018-04-10 | End: 2018-04-10

## 2018-04-10 RX ORDER — IBUPROFEN 600 MG/1
600 TABLET ORAL EVERY 6 HOURS PRN
Qty: 20 TABLET | Refills: 0 | Status: SHIPPED | OUTPATIENT
Start: 2018-04-10 | End: 2018-06-11

## 2018-04-10 RX ORDER — DIPHENHYDRAMINE HCL 25 MG
25 CAPSULE ORAL EVERY 6 HOURS PRN
Qty: 20 CAPSULE | Refills: 0 | Status: SHIPPED | OUTPATIENT
Start: 2018-04-10 | End: 2018-05-06 | Stop reason: ALTCHOICE

## 2018-04-10 RX ORDER — ACETAMINOPHEN 500 MG
500 TABLET ORAL EVERY 6 HOURS PRN
Qty: 30 TABLET | Refills: 0 | Status: SHIPPED | OUTPATIENT
Start: 2018-04-10 | End: 2019-03-16

## 2018-04-10 RX ADMIN — DIPHENHYDRAMINE HYDROCHLORIDE 25 MG: 50 INJECTION, SOLUTION INTRAMUSCULAR; INTRAVENOUS at 11:04

## 2018-04-10 RX ADMIN — KETOROLAC TROMETHAMINE 15 MG: 30 INJECTION, SOLUTION INTRAMUSCULAR at 11:04

## 2018-04-10 RX ADMIN — PROCHLORPERAZINE EDISYLATE 10 MG: 5 INJECTION INTRAMUSCULAR; INTRAVENOUS at 11:04

## 2018-04-10 NOTE — ED PROVIDER NOTES
"Encounter Date: 4/10/2018       History     Chief Complaint   Patient presents with    Headache     Pt states," Very bad headache over my right eye for three days."     Kehinde Soriano is a 42 y.o. male who presents to the Emergency Department with   right-sided stabbing headache for 3 days.  Patient states he took 1200 mg of  ibuprofen but it did not help      The history is provided by the patient.   Headache    This is a new problem. The current episode started in the past 7 days. The problem occurs constantly. The problem has been unchanged. The pain is located in the right unilateral region. The pain does not radiate. The quality of the pain is described as sharp. The pain is at a severity of 7/10. Pertinent negatives include no abdominal pain, abnormal behavior, back pain, blurred vision, drainage, eye pain, eye redness, fever, hearing loss, loss of balance, neck pain, numbness, phonophobia, photophobia, rhinorrhea, seizures or sinus pressure. The symptoms are aggravated by bright light and activity. He has tried NSAIDs for the symptoms. The treatment provided no relief. His past medical history is significant for obesity.     Review of patient's allergies indicates:  No Known Allergies  Past Medical History:   Diagnosis Date    Diabetes mellitus, type 2     Diverticulitis of colon     Sleep apnea      Past Surgical History:   Procedure Laterality Date    ABDOMINAL SURGERY      COLON SURGERY      HERNIA REPAIR       Family History   Problem Relation Age of Onset    Family history unknown: Yes     Social History   Substance Use Topics    Smoking status: Former Smoker     Packs/day: 0.50     Years: 10.00     Types: Cigarettes    Smokeless tobacco: Never Used    Alcohol use Yes      Comment: ocassionally     Review of Systems   Constitutional: Negative for fever.   HENT: Negative for hearing loss, rhinorrhea and sinus pressure.    Eyes: Negative for blurred vision, photophobia, pain and redness. "   Gastrointestinal: Negative for abdominal pain.   Musculoskeletal: Negative for back pain and neck pain.   Neurological: Positive for headaches. Negative for seizures, numbness and loss of balance.   All other systems reviewed and are negative.      Physical Exam     Initial Vitals [04/10/18 0947]   BP Pulse Resp Temp SpO2   (!) 150/86 91 16 97.6 °F (36.4 °C) 97 %      MAP       107.33         Physical Exam    Nursing note and vitals reviewed.  Constitutional: He appears well-developed and well-nourished. He is not diaphoretic. No distress.   HENT:   Head: Normocephalic and atraumatic.   Right Ear: External ear normal.   Left Ear: External ear normal.   Nose: Nose normal.   Mouth/Throat: Oropharynx is clear and moist.   Eyes: EOM are normal. Pupils are equal, round, and reactive to light. Right eye exhibits no discharge. Left eye exhibits no discharge.   Neck: Normal range of motion. Neck supple.   Cardiovascular: Normal rate, regular rhythm, normal heart sounds and intact distal pulses. Exam reveals no gallop and no friction rub.    No murmur heard.  Pulmonary/Chest: Breath sounds normal. No respiratory distress. He has no wheezes. He has no rhonchi. He has no rales. He exhibits no tenderness.   Abdominal: Soft. Bowel sounds are normal. He exhibits no distension and no mass. There is no tenderness. There is no rebound and no guarding.   Musculoskeletal: Normal range of motion. He exhibits no edema or tenderness.   Lymphadenopathy:     He has no cervical adenopathy.   Neurological: He is alert and oriented to person, place, and time. He has normal strength and normal reflexes. No cranial nerve deficit or sensory deficit.   Skin: Skin is warm. No rash noted. No pallor.   Psychiatric: He has a normal mood and affect.         ED Course   Procedures  Labs Reviewed   POCT CMP - Abnormal; Notable for the following:        Result Value    ALT (SGPT), POC 57 (*)     AST (SGOT), POC 40 (*)     POC Glucose 187 (*)     All  other components within normal limits   TROPONIN ISTAT   POCT CBC   POCT GLUCOSE   POCT GLUCOSE   POCT CMP   POCT TROPONIN   POCT PROTIME-INR   ISTAT PROCEDURE     EKG Readings: (Independently Interpreted)   Initial Reading: No STEMI. Rhythm: Normal Sinus Rhythm. Heart Rate: 87. Axis: Left Axis Deviation. Clinical Impression: Normal Sinus Rhythm Other Impression: Normal sinus rhythm, QTC normal 433, no ST elevation     Imaging Results          X-Ray Chest PA And Lateral (Final result)  Result time 04/10/18 10:04:14    Final result by Mike Hernández MD (04/10/18 10:04:14)                 Impression:      No acute abnormality.      Electronically signed by: Mike Hernández MD  Date:    04/10/2018  Time:    10:04             Narrative:    EXAMINATION:  XR CHEST PA AND LATERAL    CLINICAL HISTORY:  htn;    TECHNIQUE:  PA and lateral views of the chest were performed.    COMPARISON:  None    04/12/2013    FINDINGS:  The lungs are clear, with normal appearance of pulmonary vasculature and no pleural effusion or pneumothorax.    The cardiac silhouette is normal in size. The hilar and mediastinal contours are unremarkable.    Bones are intact.                               CT Head Without Contrast (Final result)  Result time 04/10/18 10:03:33    Final result by Mike Hernández MD (04/10/18 10:03:33)                 Impression:      No acute abnormality.      Electronically signed by: Mike Hernádnez MD  Date:    04/10/2018  Time:    10:03             Narrative:    EXAMINATION:  CT HEAD WITHOUT CONTRAST    CLINICAL HISTORY:  Headache, acute, norm neuro exam;    TECHNIQUE:  Low dose axial CT images obtained throughout the head without intravenous contrast. Sagittal and coronal reconstructions were performed.    COMPARISON:  None.    FINDINGS:  Intracranial compartment:    Ventricles and sulci are normal in size for age without evidence of hydrocephalus. No extra-axial blood or fluid collections.    The brain parenchyma  appears normal. No parenchymal mass, hemorrhage, edema or major vascular distribution infarct.    Skull/extracranial contents (limited evaluation): No fracture. Mastoid air cells and paranasal sinuses are essentially clear.                                                   Medical decision making   Chief complaint: Right-sided headache.  he states this is not the worse headache of his life but the pain just won't go away.  Differential diagnosis: Headache, sinus headache, tension headache, hypertension, and uncontrolled hypertension  Treatment in the ED Physical Exam, Toradol, Benadryl, and Compazine  Patient reports feeling much better after medication.  Blood pressure significantly improved after pain relieved.  Discussed labs, and imaging results.    Fill and take prescriptions as directed.  Return to the ED if symptoms worsen or do not resolve.   Answered questions and discussed discharge plan.    Patient feels much better and is ready for discharge.  Follow up with PCP in 1 days.       Clinical Impression:   The primary encounter diagnosis was Nonintractable headache, unspecified chronicity pattern, unspecified headache type. A diagnosis of HTN (hypertension) was also pertinent to this visit.                           Radha Gonzales DO  04/10/18 [id number]

## 2018-05-06 ENCOUNTER — OFFICE VISIT (OUTPATIENT)
Dept: URGENT CARE | Facility: CLINIC | Age: 43
End: 2018-05-06
Payer: MEDICAID

## 2018-05-06 VITALS
WEIGHT: 315 LBS | HEART RATE: 85 BPM | OXYGEN SATURATION: 98 % | TEMPERATURE: 98 F | DIASTOLIC BLOOD PRESSURE: 74 MMHG | BODY MASS INDEX: 50.18 KG/M2 | SYSTOLIC BLOOD PRESSURE: 125 MMHG

## 2018-05-06 DIAGNOSIS — J06.9 UPPER RESPIRATORY TRACT INFECTION, UNSPECIFIED TYPE: Primary | ICD-10-CM

## 2018-05-06 DIAGNOSIS — R52 BODY ACHES: ICD-10-CM

## 2018-05-06 DIAGNOSIS — J02.9 SORE THROAT: ICD-10-CM

## 2018-05-06 LAB
CTP QC/QA: YES
CTP QC/QA: YES
FLUAV AG NPH QL: NEGATIVE
FLUBV AG NPH QL: NEGATIVE
S PYO RRNA THROAT QL PROBE: NEGATIVE

## 2018-05-06 PROCEDURE — 87880 STREP A ASSAY W/OPTIC: CPT | Mod: QW,S$GLB,, | Performed by: FAMILY MEDICINE

## 2018-05-06 PROCEDURE — 87804 INFLUENZA ASSAY W/OPTIC: CPT | Mod: QW,S$GLB,, | Performed by: FAMILY MEDICINE

## 2018-05-06 PROCEDURE — 99214 OFFICE O/P EST MOD 30 MIN: CPT | Mod: S$GLB,,, | Performed by: FAMILY MEDICINE

## 2018-05-06 RX ORDER — GLIPIZIDE 5 MG/1
10 TABLET ORAL DAILY
COMMUNITY
Start: 2018-04-06 | End: 2022-09-06

## 2018-05-06 RX ORDER — MONTELUKAST SODIUM 10 MG/1
10 TABLET ORAL NIGHTLY
Qty: 30 TABLET | Refills: 2 | Status: SHIPPED | OUTPATIENT
Start: 2018-05-06 | End: 2019-05-06

## 2018-05-06 RX ORDER — AMLODIPINE BESYLATE 5 MG/1
TABLET ORAL
COMMUNITY
Start: 2018-03-13 | End: 2019-03-16

## 2018-05-06 RX ORDER — FLUTICASONE PROPIONATE 50 MCG
1 SPRAY, SUSPENSION (ML) NASAL DAILY
Qty: 1 BOTTLE | Refills: 0 | Status: SHIPPED | OUTPATIENT
Start: 2018-05-06 | End: 2022-09-06

## 2018-05-06 NOTE — PATIENT INSTRUCTIONS
Viral Upper Respiratory Illness (Adult)  You have a viral upper respiratory illness (URI), which is another term for the common cold. This illness is contagious during the first few days. It is spread through the air by coughing and sneezing. It may also be spread by direct contact (touching the sick person and then touching your own eyes, nose, or mouth). Frequent handwashing will decrease risk of spread. Most viral illnesses go away within 7 to 10 days with rest and simple home remedies. Sometimes the illness may last for several weeks. Antibiotics will not kill a virus, and they are generally not prescribed for this condition.    Home care  · If symptoms are severe, rest at home for the first 2 to 3 days. When you resume activity, don't let yourself get too tired.  · Avoid being exposed to cigarette smoke (yours or others).  · You may use acetaminophen or ibuprofen to control pain and fever, unless another medicine was prescribed. (Note: If you have chronic liver or kidney disease, have ever had a stomach ulcer or gastrointestinal bleeding, or are taking blood-thinning medicines, talk with your healthcare provider before using these medicines.) Aspirin should never be given to anyone under 18 years of age who is ill with a viral infection or fever. It may cause severe liver or brain damage.  · Your appetite may be poor, so a light diet is fine. Avoid dehydration by drinking 6 to 8 glasses of fluids per day (water, soft drinks, juices, tea, or soup). Extra fluids will help loosen secretions in the nose and lungs.  · Over-the-counter cold medicines will not shorten the length of time youre sick, but they may be helpful for the following symptoms: cough, sore throat, and nasal and sinus congestion. (Note: Do not use decongestants if you have high blood pressure.)  Follow-up care  Follow up with your healthcare provider, or as advised.  When to seek medical advice  Call your healthcare provider right away if any  of these occur:  · Cough with lots of colored sputum (mucus)  · Severe headache; face, neck, or ear pain  · Difficulty swallowing due to throat pain  · Fever of 100.4°F (38°C)  Call 911, or get immediate medical care  Call emergency services right away if any of these occur:  · Chest pain, shortness of breath, wheezing, or difficulty breathing  · Coughing up blood  · Inability to swallow due to throat pain  Date Last Reviewed: 9/13/2015  © 8462-0987 [employer]. [address] [place] [address]. All rights reserved. This information is not intended as a substitute for professional medical care. Always follow your healthcare professional's instructions.

## 2018-05-06 NOTE — PROGRESS NOTES
Subjective:       Patient ID: Kehinde Soriano is a 42 y.o. male.    Vitals:  weight is 149.7 kg (330 lb) (abnormal). His tympanic temperature is 97.9 °F (36.6 °C). His blood pressure is 125/74 and his pulse is 85. His oxygen saturation is 98%.     Chief Complaint: Sinus Problem    Fatigue, body aches, stuffy head/nose, congested, sore throat since this am.       Sinus Problem   This is a new problem. The current episode started today. The problem has been gradually worsening since onset. There has been no fever. His pain is at a severity of 5/10. The pain is moderate. Associated symptoms include congestion, coughing, sinus pressure, a sore throat and swollen glands. Pertinent negatives include no chills, ear pain, hoarse voice or shortness of breath. Past treatments include nothing. The treatment provided no relief.     Review of Systems   Constitution: Positive for malaise/fatigue. Negative for chills and fever.   HENT: Positive for congestion, sinus pressure and sore throat. Negative for ear pain and hoarse voice.    Eyes: Negative for discharge and redness.   Cardiovascular: Negative for chest pain, dyspnea on exertion and leg swelling.   Respiratory: Positive for cough. Negative for shortness of breath, sputum production and wheezing.    Musculoskeletal: Positive for myalgias.   Gastrointestinal: Negative for abdominal pain and nausea.       Objective:      Physical Exam   Constitutional: He is oriented to person, place, and time. He appears well-developed and well-nourished.   HENT:   Head: Normocephalic and atraumatic.       Nose: Mucosal edema, rhinorrhea and sinus tenderness (minimal) present.   Mouth/Throat: Posterior oropharyngeal erythema present. No oropharyngeal exudate.       Eyes: Conjunctivae and EOM are normal. Pupils are equal, round, and reactive to light.   Neck: Normal range of motion.   Cardiovascular: Normal rate, regular rhythm and normal heart sounds.    Pulmonary/Chest: Effort normal and  breath sounds normal. He has no wheezes. He has no rales.   Neurological: He is alert and oriented to person, place, and time.       Assessment:       1. Upper respiratory tract infection, unspecified type    2. Body aches    3. Sore throat        Plan:         Upper respiratory tract infection, unspecified type  -     (Magic mouthwash) 1:1:1 Benadryl 12.5mg/5ml liq, aluminum & magnesium hydroxide-simehticone (Maalox), lidocaine viscous 2%; Swish and spit 10 mLs every 4 (four) hours as needed. for mouth sores  Dispense: 180 mL; Refill: 0  -     montelukast (SINGULAIR) 10 mg tablet; Take 1 tablet (10 mg total) by mouth nightly.  Dispense: 30 tablet; Refill: 2  -     fluticasone (FLONASE) 50 mcg/actuation nasal spray; 1 spray (50 mcg total) by Each Nare route once daily.  Dispense: 1 Bottle; Refill: 0    Body aches  -     POCT Influenza A/B    Sore throat  -     POCT rapid strep A      Patient Instructions     Viral Upper Respiratory Illness (Adult)  You have a viral upper respiratory illness (URI), which is another term for the common cold. This illness is contagious during the first few days. It is spread through the air by coughing and sneezing. It may also be spread by direct contact (touching the sick person and then touching your own eyes, nose, or mouth). Frequent handwashing will decrease risk of spread. Most viral illnesses go away within 7 to 10 days with rest and simple home remedies. Sometimes the illness may last for several weeks. Antibiotics will not kill a virus, and they are generally not prescribed for this condition.    Home care  · If symptoms are severe, rest at home for the first 2 to 3 days. When you resume activity, don't let yourself get too tired.  · Avoid being exposed to cigarette smoke (yours or others).  · You may use acetaminophen or ibuprofen to control pain and fever, unless another medicine was prescribed. (Note: If you have chronic liver or kidney disease, have ever had a stomach  ulcer or gastrointestinal bleeding, or are taking blood-thinning medicines, talk with your healthcare provider before using these medicines.) Aspirin should never be given to anyone under 18 years of age who is ill with a viral infection or fever. It may cause severe liver or brain damage.  · Your appetite may be poor, so a light diet is fine. Avoid dehydration by drinking 6 to 8 glasses of fluids per day (water, soft drinks, juices, tea, or soup). Extra fluids will help loosen secretions in the nose and lungs.  · Over-the-counter cold medicines will not shorten the length of time youre sick, but they may be helpful for the following symptoms: cough, sore throat, and nasal and sinus congestion. (Note: Do not use decongestants if you have high blood pressure.)  Follow-up care  Follow up with your healthcare provider, or as advised.  When to seek medical advice  Call your healthcare provider right away if any of these occur:  · Cough with lots of colored sputum (mucus)  · Severe headache; face, neck, or ear pain  · Difficulty swallowing due to throat pain  · Fever of 100.4°F (38°C)  Call 911, or get immediate medical care  Call emergency services right away if any of these occur:  · Chest pain, shortness of breath, wheezing, or difficulty breathing  · Coughing up blood  · Inability to swallow due to throat pain  Date Last Reviewed: 9/13/2015  © 8196-4406 [employer]. [address] [place] [address]. All rights reserved. This information is not intended as a substitute for professional medical care. Always follow your healthcare professional's instructions.

## 2018-06-11 ENCOUNTER — HOSPITAL ENCOUNTER (EMERGENCY)
Facility: HOSPITAL | Age: 43
Discharge: HOME | End: 2018-06-11
Attending: EMERGENCY MEDICINE
Payer: MEDICAID

## 2018-06-11 VITALS
HEART RATE: 95 BPM | SYSTOLIC BLOOD PRESSURE: 139 MMHG | WEIGHT: 315 LBS | TEMPERATURE: 98 F | DIASTOLIC BLOOD PRESSURE: 78 MMHG | HEIGHT: 68 IN | RESPIRATION RATE: 18 BRPM | BODY MASS INDEX: 47.74 KG/M2 | OXYGEN SATURATION: 97 %

## 2018-06-11 DIAGNOSIS — G56.02 CARPAL TUNNEL SYNDROME OF LEFT WRIST: Primary | ICD-10-CM

## 2018-06-11 LAB — POCT GLUCOSE: 155 MG/DL (ref 70–110)

## 2018-06-11 PROCEDURE — 99284 EMERGENCY DEPT VISIT MOD MDM: CPT

## 2018-06-11 RX ORDER — ONDANSETRON 2 MG/ML
4 INJECTION INTRAMUSCULAR; INTRAVENOUS
Status: DISCONTINUED | OUTPATIENT
Start: 2018-06-11 | End: 2018-06-11

## 2018-06-11 RX ORDER — NAPROXEN 500 MG/1
500 TABLET ORAL 2 TIMES DAILY PRN
Qty: 20 TABLET | Refills: 0 | Status: SHIPPED | OUTPATIENT
Start: 2018-06-11 | End: 2018-06-15 | Stop reason: ALTCHOICE

## 2018-06-11 RX ORDER — DICLOFENAC SODIUM 10 MG/G
2 GEL TOPICAL 4 TIMES DAILY
Qty: 1 TUBE | Refills: 0 | Status: SHIPPED | OUTPATIENT
Start: 2018-06-11 | End: 2019-03-16

## 2018-06-11 RX ORDER — METHYLPREDNISOLONE 4 MG/1
TABLET ORAL
Qty: 1 PACKAGE | Refills: 0 | Status: SHIPPED | OUTPATIENT
Start: 2018-06-11 | End: 2018-06-15 | Stop reason: SINTOL

## 2018-06-14 NOTE — ED PROVIDER NOTES
"Encounter Date: 6/11/2018       History     Chief Complaint   Patient presents with    hand numbness     pt reports altered sensation in L hand x  several days, tingling sensation. "feels like it is asleep"     42-year-old male chief complaint left wrist pain and him numbness.  Patient states he works as a .  When he is using his hands his 4th and 5th fingers of go numb.  Patient states he has been noticing the symptoms on and off for a while but worse over the last week or so.  Patient states certain positions make the symptoms worse.  Patient denies injury that he recalls.  Patient denies any weakness.      The history is provided by the patient.     Review of patient's allergies indicates:  No Known Allergies  Past Medical History:   Diagnosis Date    Diabetes mellitus, type 2     Diverticulitis of colon     Hyperlipidemia     Hypertension     Sleep apnea      Past Surgical History:   Procedure Laterality Date    ABDOMINAL SURGERY      COLON SURGERY      HERNIA REPAIR       Family History   Problem Relation Age of Onset    Family history unknown: Yes     Social History   Substance Use Topics    Smoking status: Former Smoker     Packs/day: 0.50     Years: 10.00     Types: Cigarettes    Smokeless tobacco: Never Used    Alcohol use No      Comment: ocassionally     Review of Systems   Constitutional: Negative for fever.   HENT: Negative for sore throat.    Respiratory: Negative for shortness of breath.    Cardiovascular: Negative for chest pain.   Gastrointestinal: Negative for nausea.   Genitourinary: Negative for dysuria.   Musculoskeletal: Positive for arthralgias. Negative for back pain.   Skin: Negative for rash.   Neurological: Negative for weakness.   Hematological: Does not bruise/bleed easily.   All other systems reviewed and are negative.      Physical Exam     Initial Vitals [06/11/18 1304]   BP Pulse Resp Temp SpO2   139/78 95 18 97.8 °F (36.6 °C) 97 %      MAP       --     "     Physical Exam    Nursing note and vitals reviewed.  Constitutional: He appears well-developed and well-nourished.   HENT:   Head: Normocephalic and atraumatic.   Right Ear: External ear normal.   Left Ear: External ear normal.   Nose: Nose normal.   Eyes: EOM are normal. Pupils are equal, round, and reactive to light.   Neck: Normal range of motion. Neck supple.   Cardiovascular: Normal rate, regular rhythm, normal heart sounds and intact distal pulses.   Pulmonary/Chest: Breath sounds normal. No respiratory distress.   Musculoskeletal: Normal range of motion. He exhibits no edema or tenderness.   Positive Phalen's and Tinel's tests.   Neurological: He is alert and oriented to person, place, and time. No cranial nerve deficit.   Skin: Skin is warm and dry. Capillary refill takes less than 2 seconds. No erythema.   Psychiatric: He has a normal mood and affect.         ED Course   Procedures  Labs Reviewed   POCT GLUCOSE - Abnormal; Notable for the following:        Result Value    POCT Glucose 155 (*)     All other components within normal limits          X-Ray Wrist Complete Left   Final Result      No fracture or acute abnormality.         Electronically signed by: Elias Campos MD   Date:    06/11/2018   Time:    13:42                           Medical decision making   Chief complaint:  Wrist pain and finger numbness with use.  Differential diagnosis:  Arthritis, fracture, and carpal tunnel  Treatment in the ED Physical Exam, consistent with carpal tunnel syndrome.  Positive Phalen's and Tinel's test.  X-ray no acute process   Discussed imaging results and outpatient treatment plan.  Follow up with Hand surgery in 1-2 days.  Fill and take prescriptions as directed.  Return to the ED if symptoms worsen or do not resolve.   Answered questions and discussed discharge plan.    Patient feels much better and is ready for discharge.  Follow up with PCP in 1 days.       Clinical Impression:   The encounter  diagnosis was Carpal tunnel syndrome of left wrist.                             Radha Gonzales DO  06/14/18 0764

## 2018-06-15 ENCOUNTER — OFFICE VISIT (OUTPATIENT)
Dept: URGENT CARE | Facility: CLINIC | Age: 43
End: 2018-06-15
Payer: MEDICAID

## 2018-06-15 VITALS
TEMPERATURE: 97 F | DIASTOLIC BLOOD PRESSURE: 57 MMHG | SYSTOLIC BLOOD PRESSURE: 99 MMHG | WEIGHT: 315 LBS | BODY MASS INDEX: 50.18 KG/M2 | HEART RATE: 99 BPM | OXYGEN SATURATION: 97 %

## 2018-06-15 DIAGNOSIS — M79.632 LEFT FOREARM PAIN: Primary | ICD-10-CM

## 2018-06-15 DIAGNOSIS — R20.0 NUMBNESS AND TINGLING IN LEFT HAND: ICD-10-CM

## 2018-06-15 DIAGNOSIS — R20.2 NUMBNESS AND TINGLING IN LEFT HAND: ICD-10-CM

## 2018-06-15 PROCEDURE — 99213 OFFICE O/P EST LOW 20 MIN: CPT | Mod: S$GLB,,, | Performed by: PHYSICIAN ASSISTANT

## 2018-06-15 RX ORDER — LOSARTAN POTASSIUM 50 MG/1
50 TABLET ORAL DAILY
COMMUNITY
End: 2023-09-21 | Stop reason: SDUPTHER

## 2018-06-15 RX ORDER — IBUPROFEN 800 MG/1
800 TABLET ORAL 3 TIMES DAILY
Qty: 30 TABLET | Refills: 0 | Status: SHIPPED | OUTPATIENT
Start: 2018-06-15 | End: 2018-09-29

## 2018-06-15 NOTE — PATIENT INSTRUCTIONS
Please drink plenty of fluids.  Please get plenty of rest.  Please return here or go to the Emergency Department for any concerns or worsening of condition.  If you were prescribed a narcotic medication, do not drive or operate heavy equipment or machinery while taking these medications.  If you were not prescribed an anti-inflammatory medication, and if you do not have any history of stomach/intestinal ulcers, or kidney disease, or are not taking a blood thinner such as Coumadin, Plavix, Pradaxa, Eloquis, or Xaralta for example, it is OK to take over the counter Ibuprofen or Advil or Motrin or Aleve as directed.  Do not take these medications on an empty stomach.  Rest, ice, compression and elevation to the affected joint or limb as needed.  Please follow up with your primary care doctor or specialist as needed.    If you  smoke, please stop smoking.

## 2018-06-15 NOTE — PROGRESS NOTES
Subjective:       Patient ID: Kehinde Soriano is a 42 y.o. male.    Vitals:  weight is 149.7 kg (330 lb) (abnormal). His temperature is 97.4 °F (36.3 °C). His blood pressure is 99/57 (abnormal) and his pulse is 99. His oxygen saturation is 97%.     Chief Complaint: Arm Pain    Pt presents with left forearm pain and tingling in ulnar distribution x 3-5 days. He denies trauma or injury with activity. He went to ER and was dx with carpel tunnel. He was given medrol pack which pt stopped due to elevated glucose. He was rx naproxen and voltaren gel, but did not fill those medications. MB      Arm Pain    The incident occurred 3 to 5 days ago. There was no injury mechanism. The pain is present in the left forearm (pt has a lump on left arm and a small one coming on the right forearm). The quality of the pain is described as shooting and aching. The pain radiates to the left hand. The pain is at a severity of 5/10. Associated symptoms include tingling. Pertinent negatives include no chest pain. The symptoms are aggravated by movement, lifting and palpation (gripping). He has tried ice, heat, acetaminophen and NSAIDs for the symptoms. The treatment provided no relief.     Review of Systems   Constitution: Negative for chills and fever.   HENT: Negative for sore throat.    Eyes: Negative for blurred vision.   Cardiovascular: Negative for chest pain.   Respiratory: Negative for shortness of breath.    Skin: Negative for rash.   Musculoskeletal: Negative for back pain and joint pain.   Gastrointestinal: Negative for abdominal pain, diarrhea, nausea and vomiting.   Neurological: Positive for tingling. Negative for headaches.   Psychiatric/Behavioral: The patient is not nervous/anxious.    All other systems reviewed and are negative.      Objective:      Physical Exam   Constitutional: He appears well-developed and well-nourished. He is active. No distress.   HENT:   Head: Normocephalic and atraumatic.   Right Ear: Hearing and  external ear normal.   Left Ear: Hearing and external ear normal.   Nose: Nose normal. No nasal deformity. No epistaxis.   Mouth/Throat: Oropharynx is clear and moist and mucous membranes are normal.   Eyes: Conjunctivae and lids are normal. No scleral icterus.   Neck: Trachea normal and normal range of motion.   Cardiovascular: Intact distal pulses and normal pulses.    Pulmonary/Chest: Effort normal. No stridor. No respiratory distress.   Musculoskeletal:        Left elbow: He exhibits normal range of motion and no swelling. No tenderness found.        Left wrist: Normal. He exhibits normal range of motion, no tenderness and no swelling.        Left forearm: He exhibits tenderness. He exhibits no swelling and no edema.   Tinnel's positive left elbow   Neurological: He is alert. He has normal strength. He is not disoriented. No sensory deficit. GCS eye subscore is 4. GCS verbal subscore is 5. GCS motor subscore is 6.   Skin: Skin is warm, dry and intact. Capillary refill takes less than 2 seconds. He is not diaphoretic.   Psychiatric: He has a normal mood and affect. His speech is normal and behavior is normal. He is attentive.   Nursing note and vitals reviewed.      Assessment:       1. Left forearm pain    2. Numbness and tingling in left hand        Plan:         Left forearm pain  -     ibuprofen (ADVIL,MOTRIN) 800 MG tablet; Take 1 tablet (800 mg total) by mouth 3 (three) times daily. Take with meals.  Dispense: 30 tablet; Refill: 0  -     WRIST BRACE FOR HOME USE    Numbness and tingling in left hand    Please drink plenty of fluids.  Please get plenty of rest.  Please return here or go to the Emergency Department for any concerns or worsening of condition.  If you were prescribed a narcotic medication, do not drive or operate heavy equipment or machinery while taking these medications.  If you were not prescribed an anti-inflammatory medication, and if you do not have any history of stomach/intestinal  ulcers, or kidney disease, or are not taking a blood thinner such as Coumadin, Plavix, Pradaxa, Eloquis, or Xaralta for example, it is OK to take over the counter Ibuprofen or Advil or Motrin or Aleve as directed.  Do not take these medications on an empty stomach.  Rest, ice, compression and elevation to the affected joint or limb as needed.  Please follow up with your primary care doctor or specialist as needed.    If you  smoke, please stop smoking.

## 2018-09-28 VITALS
BODY MASS INDEX: 47.74 KG/M2 | HEART RATE: 102 BPM | SYSTOLIC BLOOD PRESSURE: 152 MMHG | HEIGHT: 68 IN | WEIGHT: 315 LBS | OXYGEN SATURATION: 95 % | RESPIRATION RATE: 16 BRPM | TEMPERATURE: 98 F | DIASTOLIC BLOOD PRESSURE: 77 MMHG

## 2018-09-28 PROCEDURE — 82947 ASSAY GLUCOSE BLOOD QUANT: CPT

## 2018-09-28 PROCEDURE — 99284 EMERGENCY DEPT VISIT MOD MDM: CPT

## 2018-09-29 ENCOUNTER — HOSPITAL ENCOUNTER (EMERGENCY)
Facility: HOSPITAL | Age: 43
Discharge: HOME | End: 2018-09-29
Attending: INTERNAL MEDICINE
Payer: MEDICAID

## 2018-09-29 DIAGNOSIS — S93.602A FOOT SPRAIN, LEFT, INITIAL ENCOUNTER: Primary | ICD-10-CM

## 2018-09-29 DIAGNOSIS — R52 PAIN: ICD-10-CM

## 2018-09-29 LAB — POCT GLUCOSE: 136 MG/DL (ref 70–110)

## 2018-09-29 RX ORDER — IBUPROFEN 800 MG/1
800 TABLET ORAL EVERY 8 HOURS PRN
Qty: 30 TABLET | Refills: 0 | Status: SHIPPED | OUTPATIENT
Start: 2018-09-29 | End: 2019-03-28

## 2018-09-29 NOTE — ED PROVIDER NOTES
Encounter Date: 9/28/2018    SCRIBE #1 NOTE: I, Jermaine Diop, am scribing for, and in the presence of,  Dr. Verde. I have scribed the following portions of the note - Other sections scribed: HPI, ROS, PE.       History     Chief Complaint   Patient presents with    Foot Pain     pt c/o L foot pain s/p steping off curve x 1 hour ago,pt states that he is unable to bear weight on L side of foot     This is a 42 y.o. male who presents to the ED with a complaint of pain to the left foot that began one hour ago.  Patient states that he stepped off of a curve prior to the onset of his pain.  Patient states that bearing weight worsens his pain to the foot. He notes that nothing relieves him of this pain. He has not taken any medication for his symptoms.       The history is provided by the patient.     Review of patient's allergies indicates:  No Known Allergies  Past Medical History:   Diagnosis Date    Diabetes mellitus, type 2     Diverticulitis of colon     Hyperlipidemia     Hypertension     Sleep apnea      Past Surgical History:   Procedure Laterality Date    ABDOMINAL SURGERY      COLON SURGERY      HERNIA REPAIR       Family History   Family history unknown: Yes     Social History     Tobacco Use    Smoking status: Former Smoker     Packs/day: 0.50     Years: 10.00     Pack years: 5.00     Types: Cigarettes    Smokeless tobacco: Never Used   Substance Use Topics    Alcohol use: No     Comment: ocassionally    Drug use: No     Review of Systems   Musculoskeletal:        Positive for pain to the left foot.   All other systems reviewed and are negative.      Physical Exam     Initial Vitals [09/28/18 2347]   BP Pulse Resp Temp SpO2   (!) 152/77 102 16 98 °F (36.7 °C) 95 %      MAP       --         Physical Exam    Nursing note and vitals reviewed.  Constitutional: He appears well-developed and well-nourished.   HENT:   Head: Normocephalic and atraumatic.   Right Ear: External ear normal.   Left Ear:  External ear normal.   Nose: Nose normal.   Eyes: Conjunctivae are normal.   Neck: Normal range of motion. Neck supple.   Cardiovascular: Normal rate, regular rhythm, normal heart sounds and intact distal pulses. Exam reveals no gallop and no friction rub.    No murmur heard.  Pulmonary/Chest: Breath sounds normal. No respiratory distress. He has no wheezes. He has no rhonchi. He has no rales.   Musculoskeletal: Normal range of motion.        Left foot: There is no bony tenderness, no swelling and no deformity.   Left fourth and fifth toe pain upon movement. NV Intact. No gross deformities.    Neurological: He is alert and oriented to person, place, and time.   Skin: Skin is warm and dry. Capillary refill takes less than 2 seconds.   Psychiatric: He has a normal mood and affect. His behavior is normal.         ED Course   Procedures  Labs Reviewed   POCT GLUCOSE - Abnormal; Notable for the following components:       Result Value    POCT Glucose 136 (*)     All other components within normal limits          Imaging Results          X-Ray Foot Complete Left (Final result)  Result time 09/29/18 00:10:47    Final result by Eric Villalta MD (09/29/18 00:10:47)                 Impression:      See above.      Electronically signed by: Eric Villalta MD  Date:    09/29/2018  Time:    00:10             Narrative:    EXAMINATION:  XR FOOT COMPLETE 3 VIEW LEFT    CLINICAL HISTORY:  .  Pain, unspecified    TECHNIQUE:  AP, lateral and oblique views of the left foot were performed.    COMPARISON:  None    FINDINGS:  No evidence of acute fracture, dislocation, or osseous destructive process.  Lisfranc articulation is congruent.    Small 2 mm radiopaque density is visualized at the plantar aspect of the foot at the level of the 2nd and 3rd metatarsal shafts.  This could represent soft tissue calcification versus small retained foreign body.                                 Medical Decision Making:   Initial Assessment:   This  is a 42 y.o. male who presents to the ED with a complaint of pain to the left foot that began one hour ago.  Patient states that he stepped off of a curve prior to the onset of his pain.  Patient states that bearing weight worsens his pain to the foot. He notes that nothing relieves him of this pain. He has not taken any medication for his symptoms.       Clinical Tests:   Lab Tests: Ordered  Radiological Study: Ordered            Scribe Attestation:   Scribe #1: I performed the above scribed service and the documentation accurately describes the services I performed. I attest to the accuracy of the note.    This document was produced by a scribe under my direction and in my presence. I agree with the content of the note and have made any necessary edits.     Dr. Verde    10/23/2018 10:17 AM             Clinical Impression:     1. Foot sprain, left, initial encounter    2. Pain            Disposition:   Disposition: Discharged  Condition: Stable                        Reynaldo Verde MD  10/23/18 1017

## 2018-10-25 ENCOUNTER — OFFICE VISIT (OUTPATIENT)
Dept: URGENT CARE | Facility: CLINIC | Age: 43
End: 2018-10-25
Payer: MEDICAID

## 2018-10-25 VITALS
RESPIRATION RATE: 20 BRPM | TEMPERATURE: 98 F | HEIGHT: 68 IN | OXYGEN SATURATION: 95 % | BODY MASS INDEX: 47.74 KG/M2 | HEART RATE: 101 BPM | WEIGHT: 315 LBS | DIASTOLIC BLOOD PRESSURE: 86 MMHG | SYSTOLIC BLOOD PRESSURE: 154 MMHG

## 2018-10-25 DIAGNOSIS — K52.9 GASTROENTERITIS: Primary | ICD-10-CM

## 2018-10-25 PROCEDURE — 99214 OFFICE O/P EST MOD 30 MIN: CPT | Mod: S$GLB,,, | Performed by: SURGERY

## 2018-10-25 RX ORDER — DICYCLOMINE HYDROCHLORIDE 20 MG/1
20 TABLET ORAL
Status: COMPLETED | OUTPATIENT
Start: 2018-10-25 | End: 2018-10-25

## 2018-10-25 RX ORDER — DICYCLOMINE HYDROCHLORIDE 20 MG/1
20 TABLET ORAL 3 TIMES DAILY PRN
Qty: 21 TABLET | Refills: 0 | Status: SHIPPED | OUTPATIENT
Start: 2018-10-25 | End: 2018-11-01

## 2018-10-25 RX ADMIN — DICYCLOMINE HYDROCHLORIDE 20 MG: 20 TABLET ORAL at 04:10

## 2018-10-25 NOTE — PROGRESS NOTES
"Subjective:       Patient ID: Kehinde Soriano is a 42 y.o. male.    Vitals:  height is 5' 8" (1.727 m) and weight is 149.7 kg (330 lb) (abnormal). His temperature is 97.8 °F (36.6 °C). His blood pressure is 154/86 (abnormal) and his pulse is 101. His respiration is 20 and oxygen saturation is 95%.     Chief Complaint: Abdominal Pain    4am this morning pt woke up with diarrhea and stomach cramps. It hasn't gotten and better and he hasnt taken anything for it.      Abdominal Pain   This is a new problem. The current episode started today. The onset quality is sudden. The problem occurs constantly. The problem has been unchanged. The pain is located in the generalized abdominal region. The pain is at a severity of 4/10. The pain is mild. The quality of the pain is cramping. Associated symptoms include diarrhea. Pertinent negatives include no constipation, dysuria, fever, hematochezia, melena, nausea or vomiting. Nothing aggravates the pain. The pain is relieved by nothing. He has tried nothing for the symptoms.     Review of Systems   Constitution: Negative for chills and fever.   Cardiovascular: Negative for chest pain.   Respiratory: Negative for shortness of breath.    Musculoskeletal: Negative for back pain.   Gastrointestinal: Positive for abdominal pain and diarrhea. Negative for constipation, hematochezia, melena, nausea and vomiting.   Genitourinary: Negative for dysuria.   All other systems reviewed and are negative.      Objective:      Physical Exam   Constitutional: He is oriented to person, place, and time. He appears well-developed and well-nourished. He is cooperative.  Non-toxic appearance. He does not appear ill. No distress.   HENT:   Head: Normocephalic and atraumatic.   Right Ear: Hearing, tympanic membrane, external ear and ear canal normal.   Left Ear: Hearing, tympanic membrane, external ear and ear canal normal.   Nose: Nose normal. No mucosal edema, rhinorrhea or nasal deformity. No " epistaxis. Right sinus exhibits no maxillary sinus tenderness and no frontal sinus tenderness. Left sinus exhibits no maxillary sinus tenderness and no frontal sinus tenderness.   Mouth/Throat: Uvula is midline, oropharynx is clear and moist and mucous membranes are normal. No trismus in the jaw. Normal dentition. No uvula swelling. No posterior oropharyngeal erythema.   Eyes: Conjunctivae and lids are normal. Right eye exhibits no discharge. Left eye exhibits no discharge. No scleral icterus.   Sclera clear bilat   Neck: Trachea normal, normal range of motion, full passive range of motion without pain and phonation normal. Neck supple.   Cardiovascular: Normal rate, regular rhythm, normal heart sounds, intact distal pulses and normal pulses.   Pulmonary/Chest: Effort normal and breath sounds normal. No respiratory distress.   Abdominal: Soft. Normal appearance and bowel sounds are normal. He exhibits no distension, no pulsatile midline mass and no mass. There is no tenderness.   Musculoskeletal: Normal range of motion. He exhibits no edema or deformity.   Neurological: He is alert and oriented to person, place, and time. He exhibits normal muscle tone. Coordination normal.   Skin: Skin is warm, dry and intact. He is not diaphoretic. No pallor.   Psychiatric: He has a normal mood and affect. His speech is normal and behavior is normal. Judgment and thought content normal. Cognition and memory are normal.   Nursing note and vitals reviewed.      Assessment:       1. Gastroenteritis        Plan:         Gastroenteritis  -     dicyclomine (BENTYL) 20 mg tablet; Take 1 tablet (20 mg total) by mouth 3 (three) times daily as needed.  Dispense: 21 tablet; Refill: 0  -     dicyclomine tablet 20 mg

## 2018-10-25 NOTE — PATIENT INSTRUCTIONS
Noninfectious Gastroenteritis (Ages 6 Years to Adult)    Gastroenteritis can cause nausea, vomiting, diarrhea, and abdominal cramping. This may occur as a result of food sensitivity, inflammation of your gastrointestinal tract, medicines, stress, or other causes not related to infection. Your symptoms will usually last from 1 to 3 days, but can last longer. Antibiotics are not effective, but simple home treatment will be helpful.  Home care  Medicine  · You may use acetaminophen or NSAID medicines like ibuprofen or naproxen to control fever, unless another medicine is prescribed. (Note: If you have chronic liver or kidney disease, or ever had a stomach ulcer or gastrointestinalI bleeding, talk with your healthcare provider before using these medicines.) Aspirin should never be used in anyone under 18 years of age who is ill with a fever. It may cause severe liver damage. Don't increase your NSAID medicines if you are already taking these medicines for another condition (like arthritis). Don't use NSAIDS if you are on aspirin (such as for heart disease, or after a stroke).  · If medicines for diarrhea or vomiting are prescribed, take only as directed.  General care and preventing spread of the illness  · If symptoms are severe, rest at home for the next 24 hours or until you feel better.  · Hand washing with soap and water is the best way to prevent the spread of infection. Wash your hands after touching anyone who is sick.  · Wash your hands after using the toilet and before meals. Clean the toilet after each use.  · Caffeine, tobacco, and alcohol can make your diarrhea, cramping, and pain worse.  Diet  · Water and clear liquids are important so you do not get dehydrated. Drink a small amount at a time.  · Do not force yourself to eat, especially if you have cramps, vomiting, or diarrhea. When you finally decide to start eating, do not eat large amounts at a time, even if you are hungry.  · If you eat, avoid  fatty, greasy, spicy, or fried foods.  · Do not eat dairy products if you have diarrhea; they can make the diarrhea worse.  During the first 24 hours (the first full day), follow the diet below:  · Beverages: Water, clear liquids, soft drinks without caffeine, like ginger ale; mineral water (plain or flavored); decaffeinated tea and coffee.  · Soups: Clear broth, consommé, and bouillon Sports drinks aren't a good choice because they have too much sugar and not enough electrolytes. In this case, commercially available products called oral rehydration solutions are best.  · Desserts: Plain gelatin, popsicles, and fruit juice bars.  During the next 24 hours (the second day), you may add the following to the above if you have improved. If not, continue what you did the first day:  · Hot cereal, plain toast, bread, rolls, crackers  · Plain noodles, rice, mashed potatoes, chicken noodle or rice soup  · Unsweetened canned fruit (avoid pineapple), bananas  · Limit caffeine and chocolate. No spices or seasonings except salt.  During the next 24 hours  · Gradually resume a normal diet, as you feel better and your symptoms improve.  · If at any time your symptoms start getting worse, go back to clear liquids until you feel better.  Food preparation  · If you have diarrhea, you should not prepare food for others. When you  prepare food for yourself, wash your hands before and after.  · Wash your hands after using cutting boards, countertops, and knives that have been in contact with raw food.  · Keep uncooked meats away from cooked and ready-to-eat foods.  Follow-up care  Follow up with your healthcare provider if you are not improving over the next 2 to 3 days, or as advised. If a stool (diarrhea) sample was taken, call for the results as directed.  When to seek medical care  Call your healthcare provider right away if any of these occur:   · Increasing abdominal pain or constant lower right abdominal pain  · Continued  vomiting (unable to keep liquids down)  · Frequent diarrhea (more than 5 times a day)  · Blood in vomit or stool (black or red color)  · Inability to tolerate solid food after a few days.  · Dark urine, reduced urine output  · Weakness, dizziness  · Drowsiness  · Fever of 100.4ºF (38.0ºC) or higher, or as directed by your healthcare provider  · New rash  Call 911  Call 911 if any of these occur:  · Trouble breathing  · Chest pain  · Confusion  · Severe drowsiness or trouble awakening  · Seizure  · Stiff neck  Date Last Reviewed: 11/16/2015  © 8586-2459 [employer]. [address] [place] [address], [place] [address]. All rights reserved. This information is not intended as a substitute for professional medical care. Always follow your healthcare professional's instructions.

## 2018-11-17 ENCOUNTER — HOSPITAL ENCOUNTER (EMERGENCY)
Facility: HOSPITAL | Age: 43
Discharge: HOME | End: 2018-11-17
Attending: EMERGENCY MEDICINE
Payer: MEDICAID

## 2018-11-17 VITALS
HEART RATE: 80 BPM | OXYGEN SATURATION: 97 % | TEMPERATURE: 98 F | BODY MASS INDEX: 54.74 KG/M2 | SYSTOLIC BLOOD PRESSURE: 167 MMHG | DIASTOLIC BLOOD PRESSURE: 95 MMHG | RESPIRATION RATE: 16 BRPM | WEIGHT: 315 LBS

## 2018-11-17 DIAGNOSIS — I10 HYPERTENSION, UNSPECIFIED TYPE: Primary | ICD-10-CM

## 2018-11-17 DIAGNOSIS — E11.8 CONTROLLED TYPE 2 DIABETES MELLITUS WITH COMPLICATION, WITHOUT LONG-TERM CURRENT USE OF INSULIN: ICD-10-CM

## 2018-11-17 LAB — POCT GLUCOSE: 202 MG/DL (ref 70–110)

## 2018-11-17 PROCEDURE — 99284 EMERGENCY DEPT VISIT MOD MDM: CPT

## 2018-11-17 RX ORDER — ATORVASTATIN CALCIUM 20 MG/1
20 TABLET, FILM COATED ORAL DAILY
COMMUNITY
End: 2018-11-17 | Stop reason: SDUPTHER

## 2018-11-17 RX ORDER — ATORVASTATIN CALCIUM 20 MG/1
20 TABLET, FILM COATED ORAL DAILY
Qty: 30 TABLET | Refills: 0 | Status: SHIPPED | OUTPATIENT
Start: 2018-11-17

## 2018-11-17 RX ORDER — LOSARTAN POTASSIUM 50 MG/1
50 TABLET ORAL DAILY
Qty: 30 TABLET | Refills: 0 | Status: SHIPPED | OUTPATIENT
Start: 2018-11-17 | End: 2019-11-17

## 2018-11-17 RX ORDER — METFORMIN HYDROCHLORIDE 500 MG/1
1000 TABLET ORAL 2 TIMES DAILY WITH MEALS
Qty: 60 TABLET | Refills: 0 | Status: SHIPPED | OUTPATIENT
Start: 2018-11-17 | End: 2022-09-06

## 2018-11-17 NOTE — ED PROVIDER NOTES
"Encounter Date: 11/17/2018       History     Chief Complaint   Patient presents with    Medication Refill     Pt states," I am out of my diabetic medications and need them refilled."     42-year-old male chief complaint diabetes and high blood pressure needing medications refilled.  Patient would also like his glucose checked.  Patient states he takes metformin 1000 mg 1 tablet twice a day, losartan 50 mg 1 tablet daily, and RO statin 20 mg 1 tablet every day.  Patient reports being out of his medications for 3 days.  Patient denies any other complaints      The history is provided by the patient.     Review of patient's allergies indicates:  No Known Allergies  Past Medical History:   Diagnosis Date    Diabetes mellitus, type 2     Diverticulitis of colon     Hyperlipidemia     Hypertension     Sleep apnea      Past Surgical History:   Procedure Laterality Date    ABDOMINAL SURGERY      COLON SURGERY      HERNIA REPAIR       Family History   Family history unknown: Yes     Social History     Tobacco Use    Smoking status: Former Smoker     Packs/day: 0.50     Years: 10.00     Pack years: 5.00     Types: Cigarettes    Smokeless tobacco: Never Used   Substance Use Topics    Alcohol use: No     Comment: ocassionally    Drug use: No     Review of Systems   Constitutional: Negative for fever.   HENT: Negative for sore throat.    Respiratory: Negative for shortness of breath.    Cardiovascular: Negative for chest pain.   Gastrointestinal: Negative for nausea.   Genitourinary: Negative for dysuria.   Musculoskeletal: Negative for back pain.   Skin: Negative for rash.   Neurological: Negative for weakness.   Hematological: Does not bruise/bleed easily.   All other systems reviewed and are negative.      Physical Exam     Initial Vitals [11/17/18 0700]   BP Pulse Resp Temp SpO2   (!) 167/95 80 16 98 °F (36.7 °C) 97 %      MAP       --         Physical Exam    Nursing note and vitals reviewed.  Constitutional: " He appears well-developed and well-nourished. No distress.   HENT:   Head: Normocephalic and atraumatic.   Right Ear: External ear normal.   Left Ear: External ear normal.   Nose: Nose normal.   Eyes: EOM are normal. Pupils are equal, round, and reactive to light.   Neck: Normal range of motion. Neck supple.   Cardiovascular: Normal rate, regular rhythm and intact distal pulses.   Pulmonary/Chest: Breath sounds normal. No respiratory distress.   Abdominal: Soft. There is no tenderness.   Musculoskeletal: Normal range of motion.   Neurological: He is alert and oriented to person, place, and time. No cranial nerve deficit.   Skin: Skin is warm and dry. Capillary refill takes less than 2 seconds.   Psychiatric: He has a normal mood and affect.         ED Course   Procedures  Labs Reviewed   POCT GLUCOSE - Abnormal; Notable for the following components:       Result Value    POCT Glucose 202 (*)     All other components within normal limits   POCT GLUCOSE MONITORING CONTINUOUS                               Medical decision making   Fill and take prescriptions as directed.  Return to the ED if symptoms worsen or do not resolve.   Answered questions and discussed discharge plan.    Patient feels much better and is ready for discharge.  Follow up with PCP in 1 days.       Clinical Impression:   The primary encounter diagnosis was Hypertension, unspecified type. A diagnosis of Controlled type 2 diabetes mellitus with complication, without long-term current use of insulin was also pertinent to this visit.                             Radha Gonzales DO  11/17/18 5148

## 2018-11-17 NOTE — ED NOTES
Pt requests refills of home medications, has medication bottles needing refill.  Pt denies any complaints.

## 2019-03-16 ENCOUNTER — HOSPITAL ENCOUNTER (EMERGENCY)
Facility: HOSPITAL | Age: 44
Discharge: HOME | End: 2019-03-16
Attending: EMERGENCY MEDICINE
Payer: MEDICAID

## 2019-03-16 VITALS
OXYGEN SATURATION: 98 % | HEIGHT: 68 IN | HEART RATE: 86 BPM | BODY MASS INDEX: 47.74 KG/M2 | WEIGHT: 315 LBS | SYSTOLIC BLOOD PRESSURE: 134 MMHG | TEMPERATURE: 98 F | RESPIRATION RATE: 20 BRPM | DIASTOLIC BLOOD PRESSURE: 76 MMHG

## 2019-03-16 DIAGNOSIS — M79.89 SWELLING OF RIGHT HAND: ICD-10-CM

## 2019-03-16 DIAGNOSIS — R20.2 PARESTHESIAS IN LEFT HAND: Primary | ICD-10-CM

## 2019-03-16 LAB
POCT GLUCOSE: 223 MG/DL (ref 70–110)
URATE SERPL-MCNC: 3.2 MG/DL

## 2019-03-16 PROCEDURE — 84550 ASSAY OF BLOOD/URIC ACID: CPT

## 2019-03-16 PROCEDURE — 85025 COMPLETE CBC W/AUTO DIFF WBC: CPT | Mod: ER

## 2019-03-16 PROCEDURE — 25000003 PHARM REV CODE 250: Mod: ER | Performed by: EMERGENCY MEDICINE

## 2019-03-16 PROCEDURE — 99284 EMERGENCY DEPT VISIT MOD MDM: CPT | Mod: 25,ER

## 2019-03-16 RX ORDER — KETOROLAC TROMETHAMINE 10 MG/1
10 TABLET, FILM COATED ORAL
Status: COMPLETED | OUTPATIENT
Start: 2019-03-16 | End: 2019-03-16

## 2019-03-16 RX ORDER — TRAMADOL HYDROCHLORIDE 50 MG/1
50 TABLET ORAL EVERY 6 HOURS PRN
Qty: 12 TABLET | Refills: 0 | OUTPATIENT
Start: 2019-03-16 | End: 2019-03-18

## 2019-03-16 RX ADMIN — KETOROLAC TROMETHAMINE 10 MG: 10 TABLET, FILM COATED ORAL at 11:03

## 2019-03-16 NOTE — ED PROVIDER NOTES
"Encounter Date: 3/16/2019    SCRIBE #1 NOTE: I, Gina Sanchez, am scribing for, and in the presence of,  Dr. Champion. I have scribed the following portions of the note - Other sections scribed: HPI, ROS, PE.       History     Chief Complaint   Patient presents with    left hand pain     pt c/o pain to the left hand described at first as a tingling sensation and has been progressively getting worse since 2 days ago. denies any injury.     Kehinde Soriano is a 43 y.o. male with DM who presents to the ED complaining of left hand pain, onset this morning. He reports a tingling sensation in his 2nd and 3rd left fingers for 2 days and wears a prescribed hand brace, but the pain was so severe this morning that he could not put the brace on. The pain is worsened by movement and pressure, and fluctuates between 6/10-10/10. When he rotates his arm and presses on his left wrist, the pain "shoots" up to his 2nd and 3rd fingers, and he cannot make a fist due to the pain. He denies fever, gout, and making repetitive arm movements. He has not taken any medication for the pain.          Review of patient's allergies indicates:  No Known Allergies  Past Medical History:   Diagnosis Date    Diabetes mellitus, type 2     Diverticulitis of colon     Hyperlipidemia     Hypertension     Sleep apnea      Past Surgical History:   Procedure Laterality Date    ABDOMINAL SURGERY      APPENDECTOMY      COLON SURGERY      HERNIA REPAIR       Family History   Family history unknown: Yes     Social History     Tobacco Use    Smoking status: Former Smoker     Packs/day: 0.50     Years: 10.00     Pack years: 5.00     Types: Cigarettes    Smokeless tobacco: Never Used   Substance Use Topics    Alcohol use: No     Comment: ocassionally    Drug use: No     Review of Systems   Constitutional: Negative for fever.   Musculoskeletal:        Right hand/arm pain   Skin: Negative for color change.   Neurological: Negative for numbness. "       Physical Exam     Initial Vitals [03/16/19 1012]   BP Pulse Resp Temp SpO2   135/87 85 18 97.8 °F (36.6 °C) 97 %      MAP       --         Physical Exam    Nursing note and vitals reviewed.  Constitutional: He appears well-developed and well-nourished. He is Obese .   HENT:   Head: Normocephalic and atraumatic.   Eyes: Conjunctivae are normal.   Neck: Normal range of motion. Neck supple.   Cardiovascular: Normal rate and intact distal pulses.   Pulmonary/Chest: No respiratory distress.   Musculoskeletal:        Arms:       Right hand: Normal.        Left hand: He exhibits decreased range of motion (limited by pain) and swelling. Normal sensation noted.        Hands:  Neurological: He is alert and oriented to person, place, and time. No sensory deficit.   Skin: Skin is warm and dry.   Psychiatric: He has a normal mood and affect. His behavior is normal.         ED Course   Procedures  Labs Reviewed   URIC ACID - Abnormal; Notable for the following components:       Result Value    Uric Acid 3.2 (*)     All other components within normal limits   POCT GLUCOSE - Abnormal; Notable for the following components:    POCT Glucose 223 (*)     All other components within normal limits   POCT CBC          Imaging Results          X-Ray Hand 3 View Left (Final result)  Result time 03/16/19 10:58:35   Procedure changed from X-Ray Hand 2 View Left     Final result by Juvencio Ríos MD (03/16/19 10:58:35)                 Impression:      No acute abnormality.  Benign-appearing lucency in the long finger middle phalanx and a lucency in the index finger middle phalanx are possibly subchondral cysts.      Electronically signed by: Juvencio Ríos MD  Date:    03/16/2019  Time:    10:58             Narrative:    EXAMINATION:  XR HAND COMPLETE 3 VIEW LEFT    CLINICAL HISTORY:  swelling;. Other specified soft tissue disorders    TECHNIQUE:  PA, lateral, and oblique views of the left hand were  performed.    COMPARISON:  None    FINDINGS:  In the long finger middle phalanx at the base on the radial side, there is a tiny oval lucency with narrow zone of transition measuring 0.3 cm adjacent to the PIP articulation.  There is a similar lucency in the index finger middle phalanx at the head adjacent to the DIP articulation on the ulnar side.  Both of these lucencies are benign in appearance.    Otherwise, the left hand and fingers are unremarkable.  No displaced fracture or subluxation.  No suspicious bone lesion.    Unremarkable soft tissues.    No prominent degenerative change.  No bone erosion.                                 Medical Decision Making:   History:   Old Medical Records: I decided to obtain old medical records.  Initial Assessment:   This is a 43 y.o. male who presents to the ED complaining of left hand pain, onset this morning.    Patient denies fever, gout, and making repetitive arm movements.    Physical exam significant for swelling to the base of left thumb and 2nd finger.  Clinical Tests:   Lab Tests: Ordered and Reviewed       <> Summary of Lab: WBC 9.9  H&H 13.9 / 41.6  MCV 85.0  RDW% 13.0%    Radiological Study: Ordered and Reviewed  ED Management:  Will order CBC, uric acid, glucose, X-ray left hand.  Will treat with ketorolac 10 mg.  Uric acid CBC showed no significant abnormalities  Will discharge patient home with Toradol            Scribe Attestation:   Scribe #1: I performed the above scribed service and the documentation accurately describes the services I performed. I attest to the accuracy of the note.       I, Dr. Cecy Champion, personally performed the services described in this documentation. All medical record entries made by the scribe were at my direction and in my presence.  I have reviewed the chart and agree that the record reflects my personal performance and is accurate and complete. Ccey Champion MD.  6:22 PM 03/16/2019             Clinical Impression:      1. Paresthesias in left hand    2. Swelling of right hand                                   Cecy Champion MD  03/16/19 1061

## 2019-03-16 NOTE — ED PROVIDER NOTES
Encounter Date: 3/16/2019    SCRIBE #1 NOTE: I, Gina Sanchez, am scribing for, and in the presence of,  Dr. Champion. I have scribed the following portions of the note - Other sections scribed: HPI, ROS, PE.       History   No chief complaint on file.    Kehinde Soriano is a 43 y.o. male who presents to the ED complaining of left arm pain, onset      The history is provided by the patient.     Review of patient's allergies indicates:  No Known Allergies  Past Medical History:   Diagnosis Date    Diabetes mellitus, type 2     Diverticulitis of colon     Hyperlipidemia     Hypertension     Sleep apnea      Past Surgical History:   Procedure Laterality Date    ABDOMINAL SURGERY      COLON SURGERY      HERNIA REPAIR       Family History   Family history unknown: Yes     Social History     Tobacco Use    Smoking status: Former Smoker     Packs/day: 0.50     Years: 10.00     Pack years: 5.00     Types: Cigarettes    Smokeless tobacco: Never Used   Substance Use Topics    Alcohol use: No     Comment: ocassionally    Drug use: No     Review of Systems   Musculoskeletal: Positive for arthralgias.       Physical Exam     Initial Vitals   BP Pulse Resp Temp SpO2   -- -- -- -- --      MAP       --         Physical Exam    ED Course   Procedures  Labs Reviewed - No data to display       Imaging Results    None          Medical Decision Making:   History:   Old Medical Records: I decided to obtain old medical records.  Initial Assessment:   This is a 43 y.o. male who presents to the ED complaining of    Patient denies    Physical exam significant for  ED Management:  Will order  Will treat with    Will discharge patient home with            Scribe Attestation:   Scribe #1: I performed the above scribed service and the documentation accurately describes the services I performed. I attest to the accuracy of the note.    ***           Clinical Impression:   No diagnosis found.

## 2019-03-16 NOTE — DISCHARGE INSTRUCTIONS
Continue Aleve or ibuprofen as needed for pain. Use Ace wrap.  Elevate as much as possible.  Call your doctor on Monday for close follow-up.  Use wrist splint as much as possible

## 2019-03-18 ENCOUNTER — HOSPITAL ENCOUNTER (EMERGENCY)
Facility: HOSPITAL | Age: 44
Discharge: HOME | End: 2019-03-18
Attending: EMERGENCY MEDICINE
Payer: MEDICAID

## 2019-03-18 VITALS
DIASTOLIC BLOOD PRESSURE: 92 MMHG | SYSTOLIC BLOOD PRESSURE: 171 MMHG | BODY MASS INDEX: 47.74 KG/M2 | OXYGEN SATURATION: 100 % | HEIGHT: 68 IN | TEMPERATURE: 98 F | RESPIRATION RATE: 16 BRPM | WEIGHT: 315 LBS | HEART RATE: 73 BPM

## 2019-03-18 DIAGNOSIS — M79.89 SWELLING OF LEFT HAND: Primary | ICD-10-CM

## 2019-03-18 LAB — POCT GLUCOSE: 194 MG/DL (ref 70–110)

## 2019-03-18 PROCEDURE — 96372 THER/PROPH/DIAG INJ SC/IM: CPT | Mod: ER

## 2019-03-18 PROCEDURE — 99284 EMERGENCY DEPT VISIT MOD MDM: CPT | Mod: ER

## 2019-03-18 PROCEDURE — 63600175 PHARM REV CODE 636 W HCPCS: Mod: ER | Performed by: EMERGENCY MEDICINE

## 2019-03-18 RX ORDER — DEXAMETHASONE SODIUM PHOSPHATE 4 MG/ML
4 INJECTION, SOLUTION INTRA-ARTICULAR; INTRALESIONAL; INTRAMUSCULAR; INTRAVENOUS; SOFT TISSUE
Status: COMPLETED | OUTPATIENT
Start: 2019-03-18 | End: 2019-03-18

## 2019-03-18 RX ORDER — HYDROCODONE BITARTRATE AND ACETAMINOPHEN 5; 325 MG/1; MG/1
1 TABLET ORAL EVERY 4 HOURS PRN
Qty: 10 TABLET | Refills: 0 | Status: SHIPPED | OUTPATIENT
Start: 2019-03-18 | End: 2019-03-28

## 2019-03-18 RX ADMIN — DEXAMETHASONE SODIUM PHOSPHATE 4 MG: 4 INJECTION, SOLUTION INTRAMUSCULAR; INTRAVENOUS at 09:03

## 2019-03-18 NOTE — DISCHARGE INSTRUCTIONS
Elevate as much as possible.  Take pain medicine as needed.  Your blood sugar will increase secondary to the steroid injection so follow a strict diabetic diet

## 2019-03-18 NOTE — ED PROVIDER NOTES
Encounter Date: 3/18/2019       History     Chief Complaint   Patient presents with    hand swelling     pt c/o left hand pain and swelling that started saturday and seems to be going up the arm to the elbow. pt seen sat. and given tramadol.      Chief complaint:  Left hand pain  43-year-old complains of swelling to his left hand as well as tingling to his fingers.  Patient's symptoms started 3 days ago and have gotten worse.  Patient was seen here at the onset of the symptoms.  Patient had an x-ray as well as labs including uric acid which  showed a normal white blood cell count.  Uric acid was normal. Patient was advised to  Elevate his hand, use ice and an Ace wrap which he has been doing.  He has been taking tramadol without relief.  Patient initially had tingling to his 3rd and 4th fingers but now is thumb is tingling as well.  He denies weakness.  The pain now radiates up the arm to the elbow on the ulnar aspect of his arm.  He denies warmth or erythema.  No fever      The history is provided by the patient and medical records.     Review of patient's allergies indicates:  No Known Allergies  Past Medical History:   Diagnosis Date    Diabetes mellitus, type 2     Diverticulitis of colon     Hyperlipidemia     Hypertension     Sleep apnea      Past Surgical History:   Procedure Laterality Date    ABDOMINAL SURGERY      APPENDECTOMY      COLON SURGERY      HERNIA REPAIR       Family History   Family history unknown: Yes     Social History     Tobacco Use    Smoking status: Former Smoker     Packs/day: 0.50     Years: 10.00     Pack years: 5.00     Types: Cigarettes    Smokeless tobacco: Never Used   Substance Use Topics    Alcohol use: No     Comment: ocassionally    Drug use: No     Review of Systems   Constitutional: Negative for fever.   Musculoskeletal: Positive for joint swelling (left hand ).   Skin: Negative for color change.   Neurological: Positive for numbness. Negative for weakness.        Physical Exam     Initial Vitals [03/18/19 0738]   BP Pulse Resp Temp SpO2   (!) 169/83 76 18 97.9 °F (36.6 °C) 96 %      MAP       --         Physical Exam    Nursing note and vitals reviewed.  Constitutional: No distress.   HENT:   Head: Normocephalic.   Eyes: Conjunctivae are normal.   Cardiovascular:   Pulses:       Radial pulses are 3+ on the right side.   Pulmonary/Chest: No respiratory distress.   Musculoskeletal: Normal range of motion. He exhibits edema and tenderness.        Hands:  Neurological: He is alert and oriented to person, place, and time. He has normal strength. No sensory deficit.   Skin: Skin is warm. No erythema.   Psychiatric: He has a normal mood and affect.         ED Course   Procedures  Labs Reviewed   POCT GLUCOSE MONITORING CONTINUOUS          Imaging Results    None          Medical Decision Making:   Initial Assessment:   43-year-old who complains of worsening swelling to his left hand.  On exam patient does have edema to the palm of the hand as well as the dorsal surface.  No warmth erythema  ED Management:  Chart review shows a normal white blood cell count as well as uric acid from 2 days ago.  X-ray showed no acute abnormalities.  Etiology of the hand swelling is unclear.  There is no warmth erythema noted.  Patient was given Decadron IM.  He understands that this will increase his blood glucose.  He is also provided with a sling.  Patient will be discharged on hydrocodone.  We were able to obtain an appointment for this patient with Orthopedics.  However the appointment is not until April.  I spoke with the nurse at his primary care physician's office and she said he could walk in  there today.  Patient plans To do this today.                      Clinical Impression:       ICD-10-CM ICD-9-CM   1. Swelling of left hand M79.89 729.81                                Cecy Champion MD  03/18/19 1025

## 2019-03-20 ENCOUNTER — OFFICE VISIT (OUTPATIENT)
Dept: URGENT CARE | Facility: CLINIC | Age: 44
End: 2019-03-20
Payer: MEDICAID

## 2019-03-20 VITALS
TEMPERATURE: 97 F | HEIGHT: 68 IN | RESPIRATION RATE: 20 BRPM | OXYGEN SATURATION: 96 % | HEART RATE: 80 BPM | BODY MASS INDEX: 47.74 KG/M2 | WEIGHT: 315 LBS | SYSTOLIC BLOOD PRESSURE: 117 MMHG | DIASTOLIC BLOOD PRESSURE: 79 MMHG

## 2019-03-20 DIAGNOSIS — L08.9 INSECT BITE OF LEFT HAND WITH INFECTION, INITIAL ENCOUNTER: Primary | ICD-10-CM
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
## 2019-03-20 DIAGNOSIS — S60.562A INSECT BITE OF LEFT HAND WITH INFECTION, INITIAL ENCOUNTER: Primary | ICD-10-CM

## 2019-03-20 DIAGNOSIS — M79.602 LEFT ARM PAIN: ICD-10-CM

## 2019-03-20 PROCEDURE — 99214 PR OFFICE/OUTPT VISIT, EST, LEVL IV, 30-39 MIN: ICD-10-PCS | Mod: S$GLB,,, | Performed by: SURGERY

## 2019-03-20 PROCEDURE — 93000 EKG 12-LEAD: ICD-10-PCS | Mod: S$GLB,,, | Performed by: INTERNAL MEDICINE

## 2019-03-20 PROCEDURE — 93000 ELECTROCARDIOGRAM COMPLETE: CPT | Mod: S$GLB,,, | Performed by: INTERNAL MEDICINE

## 2019-03-20 PROCEDURE — 99214 OFFICE O/P EST MOD 30 MIN: CPT | Mod: S$GLB,,, | Performed by: SURGERY

## 2019-03-20 RX ORDER — CLINDAMYCIN HYDROCHLORIDE 300 MG/1
300 CAPSULE ORAL EVERY 6 HOURS
Qty: 28 CAPSULE | Refills: 0 | Status: SHIPPED | OUTPATIENT
Start: 2019-03-20 | End: 2019-03-27

## 2019-03-20 RX ORDER — NAPROXEN 500 MG/1
500 TABLET ORAL 2 TIMES DAILY PRN
Qty: 20 TABLET | Refills: 0 | Status: SHIPPED | OUTPATIENT
Start: 2019-03-20 | End: 2019-03-28

## 2019-03-20 NOTE — PROGRESS NOTES
"Subjective:       Patient ID: Kehinde Soriano is a 43 y.o. male.    Vitals:  height is 5' 8" (1.727 m) and weight is 158.8 kg (350 lb) (abnormal). His temperature is 96.5 °F (35.8 °C). His blood pressure is 117/79 and his pulse is 80. His respiration is 20 and oxygen saturation is 96%.     Chief Complaint: Insect Bite    Pt states his left arm and hand has been swelling and hurting since last Saturday. He has been to the ER twice and they haven't been hable to find anything wrong. Pt states he noticed two bite marks on his upper are and knuckle. His pain has been getting worse since it happen. He was given tramadol and Narco but it hasn't helped. He has tried benadryl and advil but nothings helping.    He notes today his pain has extended to his forearm elbow shoulder and lower neck.  He denies chest pain. He denies cardiac history of a he has high risk due to severe obesity hypertension and diabetes.  He has an appointment to follow up with his primary care on Friday 3/22.    He noted today there are 2 small insect bites on his hand and  wrist.      Insect Bite   This is a new problem. The current episode started in the past 7 days. The problem occurs constantly. The problem has been gradually worsening. Associated symptoms include joint swelling. Pertinent negatives include no arthralgias, chest pain, chills, congestion, coughing, fatigue, fever, headaches, myalgias, nausea, rash, sore throat, vertigo or vomiting. Nothing aggravates the symptoms. He has tried NSAIDs for the symptoms.       Constitution: Negative for chills, fatigue and fever.   HENT: Negative for congestion and sore throat.    Neck: Negative for painful lymph nodes.   Cardiovascular: Negative for chest pain and leg swelling.   Eyes: Negative for double vision and blurred vision.   Respiratory: Negative for cough and shortness of breath.    Gastrointestinal: Negative for nausea, vomiting and diarrhea.   Genitourinary: Negative for dysuria, " frequency and urgency.   Musculoskeletal: Positive for joint swelling. Negative for joint pain, muscle cramps and muscle ache.   Skin: Negative for color change, pale and rash.   Allergic/Immunologic: Negative for seasonal allergies.   Neurological: Negative for dizziness, history of vertigo, light-headedness, passing out and headaches.   Hematologic/Lymphatic: Negative for swollen lymph nodes, easy bruising/bleeding and history of blood clots. Does not bruise/bleed easily.   Psychiatric/Behavioral: Negative for nervous/anxious, sleep disturbance and depression. The patient is not nervous/anxious.        Objective:      Physical Exam   Constitutional: He is oriented to person, place, and time. He appears well-developed and well-nourished. He is cooperative.  Non-toxic appearance. He does not appear ill. No distress.   HENT:   Head: Normocephalic and atraumatic.   Right Ear: Hearing, tympanic membrane, external ear and ear canal normal.   Left Ear: Hearing, tympanic membrane, external ear and ear canal normal.   Nose: Nose normal. No mucosal edema, rhinorrhea or nasal deformity. No epistaxis. Right sinus exhibits no maxillary sinus tenderness and no frontal sinus tenderness. Left sinus exhibits no maxillary sinus tenderness and no frontal sinus tenderness.   Mouth/Throat: Uvula is midline, oropharynx is clear and moist and mucous membranes are normal. No trismus in the jaw. Normal dentition. No uvula swelling. No posterior oropharyngeal erythema.   Eyes: Conjunctivae and lids are normal. Right eye exhibits no discharge. Left eye exhibits no discharge. No scleral icterus.   Sclera clear bilat   Neck: Trachea normal, normal range of motion, full passive range of motion without pain and phonation normal. Neck supple.   Cardiovascular: Normal rate, regular rhythm, normal heart sounds, intact distal pulses and normal pulses.   Pulmonary/Chest: Effort normal and breath sounds normal. No respiratory distress.   Abdominal:  Soft. Normal appearance and bowel sounds are normal. He exhibits no distension, no pulsatile midline mass and no mass. There is no tenderness.   Musculoskeletal: Normal range of motion. He exhibits no edema or deformity.        Left hand: He exhibits tenderness. He exhibits normal range of motion and no bony tenderness.        Hands:  Neurological: He is alert and oriented to person, place, and time. He exhibits normal muscle tone. Coordination normal.   Skin: Skin is warm, dry and intact. He is not diaphoretic. No pallor.   Psychiatric: He has a normal mood and affect. His speech is normal and behavior is normal. Judgment and thought content normal. Cognition and memory are normal.   Nursing note and vitals reviewed.      Assessment:       1. Insect bite of left hand with infection, initial encounter    2. Left arm pain        Plan:         Insect bite of left hand with infection, initial encounter  -     clindamycin (CLEOCIN) 300 MG capsule; Take 1 capsule (300 mg total) by mouth every 6 (six) hours. for 7 days  Dispense: 28 capsule; Refill: 0  -     naproxen (NAPROSYN) 500 MG tablet; Take 1 tablet (500 mg total) by mouth 2 (two) times daily as needed (for pain, with meals).  Dispense: 20 tablet; Refill: 0    Left arm pain  -     EKG 12-lead    EKG: normal EKG, normal sinus rhythm, LVH.         Patient Instructions    EKG was within normal limits with a mildly enlarged heart.    Start oral antibiotics today and take Naprosyn prescribed as needed for pain.  Follow up with the primary care in 2 days as planned.  Warm compresses and elevation recommended.      Infected Insect Bite or Sting    When an insect stings you, it injects venom. When an insect bites you, it does not. Stings and bites may cause a local reaction. Or they may cause a reaction that affects your whole body. Bites and stings may become infected. Signs of infection include redness, warmth, pain, drainage of pus, and swelling. Infections will need  treatment with antibiotics and should get better over the next 10 days.  Home care  The following will help you care for your bite or sting at home:  · If a stinger is still in your skin, it will need to be removed. Don't use tweezers. Gently scrape the stinger from the side with a firm object such as the side of a credit card. This will loosen it and remove it from your skin.  · If itching is a problem, applying ice packs to the sting area will help.  · Wash the area with soap and water at least 3 times a day. Apply a topical antibiotic cream or ointment.  · You can use an over-the counter antihistamine unless your doctor has given you a prescription antihistamine. You may use antihistamines to reduce itching if large areas of the skin are involved. Use lower doses during the daytime and higher doses at bedtime since the drug may make you sleepy. Don't use an antihistamine if you have glaucoma or if you are a man with trouble urinating due to an enlarged prostate. Some antihistamines cause less drowsiness and are a good choice for daytime use.  · If oral antibiotics have been prescribed, be sure to take them as directed until they are all finished.  · You may use over-the-counter pain medicine to control pain, unless another pain medicine was prescribed. Talk with your doctor before using acetaminophen or ibuprofen if you have chronic liver or kidney disease. Also talk with your doctor if you have ever had a stomach ulcer or gastrointestinal bleeding.  Follow-up care  Follow up with your healthcare provider, or as advised if you don't get better over the next 2 days or if your symptoms get worse.  Call 911  Call 911 if any of these occur:  · Swelling of the face, eyelids, mouth, throat, or tongue  · Difficulty swallowing or breathing  When to seek medical advice  Call your healthcare provider right away if any of these occur:  · Spreading areas of redness or swelling  · Fever of 100.4°F (38°C) or higher, or as  directed by your healthcare provider  · Increased local pain  · Headache, fever, chills, muscle or joint aching, or vomiting,  · New rash  Date Last Reviewed: 10/1/2016  © 7089-2879 [employer]. [address] [place] [address], [place] [address]. All rights reserved. This information is not intended as a substitute for professional medical care. Always follow your healthcare professional's instructions.

## 2019-03-20 NOTE — PATIENT INSTRUCTIONS
EKG was within normal limits with a mildly enlarged heart.    Start oral antibiotics today and take Naprosyn prescribed as needed for pain.  Follow up with the primary care in 2 days as planned.  Warm compresses and elevation recommended.      Infected Insect Bite or Sting    When an insect stings you, it injects venom. When an insect bites you, it does not. Stings and bites may cause a local reaction. Or they may cause a reaction that affects your whole body. Bites and stings may become infected. Signs of infection include redness, warmth, pain, drainage of pus, and swelling. Infections will need treatment with antibiotics and should get better over the next 10 days.  Home care  The following will help you care for your bite or sting at home:  · If a stinger is still in your skin, it will need to be removed. Don't use tweezers. Gently scrape the stinger from the side with a firm object such as the side of a credit card. This will loosen it and remove it from your skin.  · If itching is a problem, applying ice packs to the sting area will help.  · Wash the area with soap and water at least 3 times a day. Apply a topical antibiotic cream or ointment.  · You can use an over-the counter antihistamine unless your doctor has given you a prescription antihistamine. You may use antihistamines to reduce itching if large areas of the skin are involved. Use lower doses during the daytime and higher doses at bedtime since the drug may make you sleepy. Don't use an antihistamine if you have glaucoma or if you are a man with trouble urinating due to an enlarged prostate. Some antihistamines cause less drowsiness and are a good choice for daytime use.  · If oral antibiotics have been prescribed, be sure to take them as directed until they are all finished.  · You may use over-the-counter pain medicine to control pain, unless another pain medicine was prescribed. Talk with your doctor before using acetaminophen or ibuprofen if  you have chronic liver or kidney disease. Also talk with your doctor if you have ever had a stomach ulcer or gastrointestinal bleeding.  Follow-up care  Follow up with your healthcare provider, or as advised if you don't get better over the next 2 days or if your symptoms get worse.  Call 911  Call 911 if any of these occur:  · Swelling of the face, eyelids, mouth, throat, or tongue  · Difficulty swallowing or breathing  When to seek medical advice  Call your healthcare provider right away if any of these occur:  · Spreading areas of redness or swelling  · Fever of 100.4°F (38°C) or higher, or as directed by your healthcare provider  · Increased local pain  · Headache, fever, chills, muscle or joint aching, or vomiting,  · New rash  Date Last Reviewed: 10/1/2016  © 3318-3336 [employer]. [address] [place] [address], [place] [address]. All rights reserved. This information is not intended as a substitute for professional medical care. Always follow your healthcare professional's instructions.

## 2019-03-28 ENCOUNTER — HOSPITAL ENCOUNTER (EMERGENCY)
Facility: HOSPITAL | Age: 44
Discharge: HOME | End: 2019-03-28
Attending: EMERGENCY MEDICINE
Payer: MEDICAID

## 2019-03-28 VITALS
HEART RATE: 75 BPM | DIASTOLIC BLOOD PRESSURE: 67 MMHG | BODY MASS INDEX: 53.22 KG/M2 | RESPIRATION RATE: 16 BRPM | WEIGHT: 315 LBS | SYSTOLIC BLOOD PRESSURE: 115 MMHG | TEMPERATURE: 98 F | OXYGEN SATURATION: 98 %

## 2019-03-28 DIAGNOSIS — M77.10 LATERAL EPICONDYLITIS, UNSPECIFIED LATERALITY: ICD-10-CM

## 2019-03-28 DIAGNOSIS — M25.522 LEFT ELBOW PAIN: ICD-10-CM

## 2019-03-28 DIAGNOSIS — G56.02 CARPAL TUNNEL SYNDROME OF LEFT WRIST: Primary | ICD-10-CM

## 2019-03-28 LAB — POCT GLUCOSE: 251 MG/DL (ref 70–110)

## 2019-03-28 PROCEDURE — 63600175 PHARM REV CODE 636 W HCPCS: Mod: ER | Performed by: EMERGENCY MEDICINE

## 2019-03-28 PROCEDURE — 99284 EMERGENCY DEPT VISIT MOD MDM: CPT | Mod: 25,ER

## 2019-03-28 PROCEDURE — 96372 THER/PROPH/DIAG INJ SC/IM: CPT | Mod: ER

## 2019-03-28 RX ORDER — DICLOFENAC SODIUM 10 MG/G
2 GEL TOPICAL 4 TIMES DAILY
Qty: 1 TUBE | Refills: 0 | Status: SHIPPED | OUTPATIENT
Start: 2019-03-28 | End: 2022-09-06

## 2019-03-28 RX ORDER — METHOCARBAMOL 750 MG/1
1500 TABLET, FILM COATED ORAL 3 TIMES DAILY PRN
Qty: 30 TABLET | Refills: 0 | Status: SHIPPED | OUTPATIENT
Start: 2019-03-28 | End: 2019-04-02

## 2019-03-28 RX ORDER — NAPROXEN 375 MG/1
375 TABLET ORAL 2 TIMES DAILY PRN
Qty: 20 TABLET | Refills: 0 | Status: SHIPPED | OUTPATIENT
Start: 2019-03-28 | End: 2019-04-02

## 2019-03-28 RX ORDER — DEXAMETHASONE SODIUM PHOSPHATE 4 MG/ML
8 INJECTION, SOLUTION INTRA-ARTICULAR; INTRALESIONAL; INTRAMUSCULAR; INTRAVENOUS; SOFT TISSUE
Status: COMPLETED | OUTPATIENT
Start: 2019-03-28 | End: 2019-03-28

## 2019-03-28 RX ORDER — KETOROLAC TROMETHAMINE 30 MG/ML
30 INJECTION, SOLUTION INTRAMUSCULAR; INTRAVENOUS
Status: COMPLETED | OUTPATIENT
Start: 2019-03-28 | End: 2019-03-28

## 2019-03-28 RX ADMIN — DEXAMETHASONE SODIUM PHOSPHATE 8 MG: 4 INJECTION, SOLUTION INTRAMUSCULAR; INTRAVENOUS at 08:03

## 2019-03-28 RX ADMIN — KETOROLAC TROMETHAMINE 30 MG: 30 INJECTION, SOLUTION INTRAMUSCULAR; INTRAVENOUS at 08:03

## 2019-03-28 NOTE — ED PROVIDER NOTES
"Encounter Date: 3/28/2019       History     Chief Complaint   Patient presents with    Arm Pain     Pt states," My left hand has been swollen for weeks and my elbow feels like it needs to pop."     43-year-old male chief complaint left hand numbness tingling and swelling.  Patient states the pain is located in his 5th 4th fingers.  Patient states the pain is been going on for few weeks.  Patient states he cannot get a orthopedic appointment any time soon.  Patient states he also has lateral left elbow pain pain is worse with movement.        Review of patient's allergies indicates:  No Known Allergies  Past Medical History:   Diagnosis Date    Diabetes mellitus, type 2     Diverticulitis of colon     Hyperlipidemia     Hypertension     Sleep apnea      Past Surgical History:   Procedure Laterality Date    ABDOMINAL SURGERY      APPENDECTOMY      COLON SURGERY      HERNIA REPAIR       Family History   Family history unknown: Yes     Social History     Tobacco Use    Smoking status: Former Smoker     Packs/day: 0.50     Years: 10.00     Pack years: 5.00     Types: Cigarettes    Smokeless tobacco: Never Used   Substance Use Topics    Alcohol use: No     Comment: ocassionally    Drug use: No     Review of Systems   Constitutional: Negative for fever.   HENT: Negative for sore throat.    Respiratory: Negative for shortness of breath.    Cardiovascular: Negative for chest pain.   Gastrointestinal: Negative for nausea.   Genitourinary: Negative for dysuria.   Musculoskeletal: Positive for arthralgias, joint swelling and myalgias. Negative for back pain.   Skin: Negative for rash.   Neurological: Negative for weakness.   Hematological: Does not bruise/bleed easily.   All other systems reviewed and are negative.      Physical Exam     Initial Vitals [03/28/19 0710]   BP Pulse Resp Temp SpO2   (!) 157/79 86 16 98.4 °F (36.9 °C) 98 %      MAP       --         Physical Exam    Nursing note and vitals " reviewed.  Constitutional: He appears well-developed and well-nourished. He is not diaphoretic. No distress.   HENT:   Head: Normocephalic and atraumatic.   Right Ear: External ear normal.   Left Ear: External ear normal.   Nose: Nose normal.   Mouth/Throat: Oropharynx is clear and moist.   Eyes: EOM are normal. Pupils are equal, round, and reactive to light. Right eye exhibits no discharge. Left eye exhibits no discharge.   Neck: Normal range of motion. Neck supple.   Cardiovascular: Normal rate, regular rhythm, normal heart sounds and intact distal pulses. Exam reveals no gallop and no friction rub.    No murmur heard.  Pulmonary/Chest: Breath sounds normal. No respiratory distress. He has no wheezes. He has no rhonchi. He has no rales. He exhibits no tenderness.   Abdominal: Soft. Bowel sounds are normal. He exhibits no distension. There is no tenderness. There is no rebound and no guarding.   Musculoskeletal: He exhibits edema and tenderness.        Left elbow: He exhibits decreased range of motion and swelling. He exhibits no effusion, no deformity and no laceration. Tenderness found.        Left wrist: He exhibits tenderness and crepitus. He exhibits no deformity and no laceration.        Arms:  Positive Tinel's test and positive Phalen's test left side   Neurological: He is alert and oriented to person, place, and time. No cranial nerve deficit or sensory deficit.   Skin: Skin is warm. No rash noted. No pallor.   Psychiatric: He has a normal mood and affect.         ED Course   Procedures  Labs Reviewed   POCT GLUCOSE - Abnormal; Notable for the following components:       Result Value    POCT Glucose 251 (*)     All other components within normal limits   POCT GLUCOSE MONITORING CONTINUOUS          Imaging Results          X-Ray Elbow Complete Left (In process)             Medical decision making   Chief complaint:  Left elbow pain radiating down to the left 4th and 5th fingers  This is patient's 3rd ED  visit for these symptoms. Patient states he cannot get an orthopedic appointment for months secondary to his insurance.  Treatment in the ED Physical Exam, Toradol and Decadron  Patient reports decreased pain after medication.    Discussed labs, and imaging results.    Fill and take prescriptions as directed.  Return to the ED if symptoms worsen or do not resolve.   Answered questions and discussed discharge plan.    Patient feels much better and is ready for discharge.  Follow up with PCP in 1 days.                            Clinical Impression:       ICD-10-CM ICD-9-CM   1. Carpal tunnel syndrome of left wrist G56.02 354.0   2. Left elbow pain M25.522 719.42   3. Lateral epicondylitis, unspecified laterality M77.10 726.32                                Radha Gonzales DO  03/31/19 0750

## 2020-01-07 NOTE — ED NOTES
Physician at bedside.  Dr. Champion    [FreeTextEntry1] : Probiotic for gut\par bp ok\par cholesterol to check\par allergic rhinitis \par flonase and azelastin\par

## 2020-07-08 ENCOUNTER — HOSPITAL ENCOUNTER (EMERGENCY)
Facility: HOSPITAL | Age: 45
Discharge: HOME | End: 2020-07-09
Attending: EMERGENCY MEDICINE
Payer: MEDICAID

## 2020-07-08 DIAGNOSIS — R11.2 NON-INTRACTABLE VOMITING WITH NAUSEA, UNSPECIFIED VOMITING TYPE: ICD-10-CM

## 2020-07-08 DIAGNOSIS — F10.921 ALCOHOL INTOXICATION WITH DELIRIUM: Primary | ICD-10-CM

## 2020-07-08 PROCEDURE — 96375 TX/PRO/DX INJ NEW DRUG ADDON: CPT

## 2020-07-08 PROCEDURE — 96365 THER/PROPH/DIAG IV INF INIT: CPT

## 2020-07-08 PROCEDURE — 93005 ELECTROCARDIOGRAM TRACING: CPT | Performed by: INTERNAL MEDICINE

## 2020-07-08 PROCEDURE — 63600175 PHARM REV CODE 636 W HCPCS: Performed by: EMERGENCY MEDICINE

## 2020-07-08 PROCEDURE — 83690 ASSAY OF LIPASE: CPT

## 2020-07-08 PROCEDURE — 80320 DRUG SCREEN QUANTALCOHOLS: CPT

## 2020-07-08 PROCEDURE — 85025 COMPLETE CBC W/AUTO DIFF WBC: CPT

## 2020-07-08 PROCEDURE — 25000003 PHARM REV CODE 250: Performed by: EMERGENCY MEDICINE

## 2020-07-08 PROCEDURE — C9113 INJ PANTOPRAZOLE SODIUM, VIA: HCPCS | Performed by: EMERGENCY MEDICINE

## 2020-07-08 PROCEDURE — 99284 EMERGENCY DEPT VISIT MOD MDM: CPT | Mod: 25

## 2020-07-08 PROCEDURE — 80053 COMPREHEN METABOLIC PANEL: CPT

## 2020-07-08 PROCEDURE — 83735 ASSAY OF MAGNESIUM: CPT

## 2020-07-08 RX ORDER — ONDANSETRON 2 MG/ML
4 INJECTION INTRAMUSCULAR; INTRAVENOUS
Status: COMPLETED | OUTPATIENT
Start: 2020-07-08 | End: 2020-07-08

## 2020-07-08 RX ORDER — PANTOPRAZOLE SODIUM 40 MG/10ML
40 INJECTION, POWDER, LYOPHILIZED, FOR SOLUTION INTRAVENOUS
Status: COMPLETED | OUTPATIENT
Start: 2020-07-08 | End: 2020-07-08

## 2020-07-08 RX ADMIN — ONDANSETRON 4 MG: 2 INJECTION INTRAMUSCULAR; INTRAVENOUS at 11:07

## 2020-07-08 RX ADMIN — FOLIC ACID: 5 INJECTION, SOLUTION INTRAMUSCULAR; INTRAVENOUS; SUBCUTANEOUS at 11:07

## 2020-07-08 RX ADMIN — PANTOPRAZOLE SODIUM 40 MG: 40 INJECTION, POWDER, FOR SOLUTION INTRAVENOUS at 11:07

## 2020-07-09 VITALS
OXYGEN SATURATION: 99 % | WEIGHT: 315 LBS | DIASTOLIC BLOOD PRESSURE: 59 MMHG | SYSTOLIC BLOOD PRESSURE: 105 MMHG | HEIGHT: 68 IN | TEMPERATURE: 98 F | BODY MASS INDEX: 47.74 KG/M2 | HEART RATE: 95 BPM | RESPIRATION RATE: 17 BRPM

## 2020-07-09 LAB
ALBUMIN SERPL BCP-MCNC: 4.1 G/DL (ref 3.5–5.2)
ALP SERPL-CCNC: 82 U/L (ref 55–135)
ALT SERPL W/O P-5'-P-CCNC: 52 U/L (ref 10–44)
ANION GAP SERPL CALC-SCNC: 11 MMOL/L (ref 8–16)
AST SERPL-CCNC: 31 U/L (ref 10–40)
BASOPHILS # BLD AUTO: 0.16 K/UL (ref 0–0.2)
BASOPHILS NFR BLD: 1.3 % (ref 0–1.9)
BILIRUB SERPL-MCNC: 0.7 MG/DL (ref 0.1–1)
BUN SERPL-MCNC: 14 MG/DL (ref 6–20)
CALCIUM SERPL-MCNC: 8.4 MG/DL (ref 8.7–10.5)
CHLORIDE SERPL-SCNC: 103 MMOL/L (ref 95–110)
CO2 SERPL-SCNC: 23 MMOL/L (ref 23–29)
CREAT SERPL-MCNC: 0.9 MG/DL (ref 0.5–1.4)
DIFFERENTIAL METHOD: ABNORMAL
EOSINOPHIL # BLD AUTO: 0.2 K/UL (ref 0–0.5)
EOSINOPHIL NFR BLD: 1.7 % (ref 0–8)
ERYTHROCYTE [DISTWIDTH] IN BLOOD BY AUTOMATED COUNT: 13.1 % (ref 11.5–14.5)
EST. GFR  (AFRICAN AMERICAN): >60 ML/MIN/1.73 M^2
EST. GFR  (NON AFRICAN AMERICAN): >60 ML/MIN/1.73 M^2
ETHANOL SERPL-MCNC: 171 MG/DL
GLUCOSE SERPL-MCNC: 187 MG/DL (ref 70–110)
HCT VFR BLD AUTO: 41.5 % (ref 40–54)
HGB BLD-MCNC: 13.8 G/DL (ref 14–18)
IMM GRANULOCYTES # BLD AUTO: 0.58 K/UL (ref 0–0.04)
IMM GRANULOCYTES NFR BLD AUTO: 4.9 % (ref 0–0.5)
LIPASE SERPL-CCNC: 109 U/L (ref 4–60)
LYMPHOCYTES # BLD AUTO: 1.8 K/UL (ref 1–4.8)
LYMPHOCYTES NFR BLD: 14.8 % (ref 18–48)
MAGNESIUM SERPL-MCNC: 2 MG/DL (ref 1.6–2.6)
MCH RBC QN AUTO: 29.1 PG (ref 27–31)
MCHC RBC AUTO-ENTMCNC: 33.3 G/DL (ref 32–36)
MCV RBC AUTO: 88 FL (ref 82–98)
MONOCYTES # BLD AUTO: 0.9 K/UL (ref 0.3–1)
MONOCYTES NFR BLD: 7.3 % (ref 4–15)
NEUTROPHILS # BLD AUTO: 8.3 K/UL (ref 1.8–7.7)
NEUTROPHILS NFR BLD: 70 % (ref 38–73)
NRBC BLD-RTO: 0 /100 WBC
PLATELET # BLD AUTO: 228 K/UL (ref 150–350)
PMV BLD AUTO: 10.7 FL (ref 9.2–12.9)
POTASSIUM SERPL-SCNC: 4 MMOL/L (ref 3.5–5.1)
PROT SERPL-MCNC: 7.2 G/DL (ref 6–8.4)
RBC # BLD AUTO: 4.74 M/UL (ref 4.6–6.2)
SODIUM SERPL-SCNC: 137 MMOL/L (ref 136–145)
WBC # BLD AUTO: 11.89 K/UL (ref 3.9–12.7)

## 2020-07-09 PROCEDURE — 96366 THER/PROPH/DIAG IV INF ADDON: CPT

## 2020-07-09 RX ORDER — ONDANSETRON 4 MG/1
4 TABLET, ORALLY DISINTEGRATING ORAL EVERY 8 HOURS PRN
Qty: 12 TABLET | Refills: 0 | Status: SHIPPED | OUTPATIENT
Start: 2020-07-09 | End: 2020-11-04 | Stop reason: ALTCHOICE

## 2020-07-09 NOTE — ED PROVIDER NOTES
Encounter Date: 7/8/2020       History   No chief complaint on file.    44-year-old male was drinking alcohol all day and felt nauseated and had several episodes of vomiting and passed out and was intoxicated when EMS got there.  Patient states his legs feel like Jell-O.  Patient states he does not remember how much she drank and how he even got here.  Patient denies any fever or chills.  Denies chest pain.  Patient complains of having nausea and vomiting.  Patient denies any pain at this time.  Patient is extremely sleepy however wakes up and answers questions and goes back to sleep.  Smells of alcohol        Review of patient's allergies indicates:  No Known Allergies  Past Medical History:   Diagnosis Date    Diabetes mellitus, type 2     Diverticulitis of colon     Hyperlipidemia     Hypertension     Sleep apnea      Past Surgical History:   Procedure Laterality Date    ABDOMINAL SURGERY      APPENDECTOMY      COLON SURGERY      HERNIA REPAIR       Family History   Family history unknown: Yes     Social History     Tobacco Use    Smoking status: Former Smoker     Packs/day: 0.50     Years: 10.00     Pack years: 5.00     Types: Cigarettes    Smokeless tobacco: Never Used   Substance Use Topics    Alcohol use: No     Comment: ocassionally    Drug use: No     Review of Systems   Reason unable to perform ROS: Intoxicated.   Gastrointestinal: Positive for nausea and vomiting.   Neurological: Positive for weakness.       Physical Exam     Initial Vitals   BP Pulse Resp Temp SpO2   -- -- -- -- --      MAP       --         Physical Exam    Nursing note and vitals reviewed.  Constitutional: He appears well-developed and well-nourished.   HENT:   Head: Normocephalic and atraumatic.   Nose: Nose normal.   Eyes: Conjunctivae and EOM are normal.   Neck: Normal range of motion. No tracheal deviation present.   Cardiovascular: Normal rate, regular rhythm, normal heart sounds and intact distal pulses. Exam reveals  no friction rub.    No murmur heard.  Pulmonary/Chest: Breath sounds normal. No respiratory distress. He has no wheezes. He has no rales.   Abdominal: Soft. He exhibits no distension. There is no abdominal tenderness. There is no rebound.   Musculoskeletal: Normal range of motion.   Neurological: He is alert. No sensory deficit.   Confused with generalized weakness however has no focal weakness.  Able to move all extremities spontaneously however he states his legs are feeling weak in general and legs are feeling like Jell-O.  However patient extremely intoxicated at this time.  Sensation present in both legs.  Oriented to person.  Not oriented To place and he states he knows he is in [place] however does not know how he got here.     Skin: Skin is warm and dry. Capillary refill takes less than 2 seconds.   Psychiatric: He has a normal mood and affect. Thought content normal.         ED Course   Procedures  Labs Reviewed   CBC W/ AUTO DIFFERENTIAL   COMPREHENSIVE METABOLIC PANEL   ALCOHOL,MEDICAL (ETHANOL)   MAGNESIUM   LIPASE          Imaging Results    None          Medical Decision Making:   Differential Diagnosis:   Patient extremely intoxicated at this time.  Screening labs and workup all ordered and patient to be re-evaluated once he is more sober.  Will treat the nausea and will treat likely alcoholic gastritis.  Patient will need re-evaluation once he is more sober.  Patient to be hydrated.  Patient turned over to Dr. Bryan camacho at change of shift.                                 Clinical Impression:       ICD-10-CM ICD-9-CM   1. Alcohol intoxication with delirium  F10.921 291.0   2. Weakness  R53.1 780.79                                Jaclyn Ponce MD  07/08/20 3192

## 2020-07-09 NOTE — DISCHARGE INSTRUCTIONS
[place] diet for the next several days. Take zofran as needed for nausea. Drink plenty of fluids and an electrolye replacement solution such as sugar free gatorade or powerade daily. Return for worsening symptoms.

## 2020-11-04 ENCOUNTER — HOSPITAL ENCOUNTER (EMERGENCY)
Facility: HOSPITAL | Age: 45
Discharge: HOME | End: 2020-11-04
Attending: EMERGENCY MEDICINE
Payer: MEDICAID

## 2020-11-04 VITALS
DIASTOLIC BLOOD PRESSURE: 72 MMHG | HEART RATE: 67 BPM | TEMPERATURE: 98 F | OXYGEN SATURATION: 95 % | WEIGHT: 315 LBS | HEIGHT: 68 IN | BODY MASS INDEX: 47.74 KG/M2 | RESPIRATION RATE: 20 BRPM | SYSTOLIC BLOOD PRESSURE: 130 MMHG

## 2020-11-04 DIAGNOSIS — N20.0 KIDNEY STONE: Primary | ICD-10-CM

## 2020-11-04 LAB
ALBUMIN SERPL BCP-MCNC: 4 G/DL (ref 3.5–5.2)
ALP SERPL-CCNC: 93 U/L (ref 55–135)
ALT SERPL W/O P-5'-P-CCNC: 33 U/L (ref 10–44)
ANION GAP SERPL CALC-SCNC: 11 MMOL/L (ref 8–16)
AST SERPL-CCNC: 17 U/L (ref 10–40)
BACTERIA #/AREA URNS HPF: NEGATIVE /HPF
BASOPHILS # BLD AUTO: 0.08 K/UL (ref 0–0.2)
BASOPHILS NFR BLD: 1.1 % (ref 0–1.9)
BILIRUB SERPL-MCNC: 0.7 MG/DL (ref 0.1–1)
BILIRUB UR QL STRIP: NEGATIVE
BUN SERPL-MCNC: 17 MG/DL (ref 6–20)
CALCIUM SERPL-MCNC: 9.4 MG/DL (ref 8.7–10.5)
CHLORIDE SERPL-SCNC: 96 MMOL/L (ref 95–110)
CLARITY UR: ABNORMAL
CO2 SERPL-SCNC: 26 MMOL/L (ref 23–29)
COLOR UR: ABNORMAL
CREAT SERPL-MCNC: 0.9 MG/DL (ref 0.5–1.4)
DIFFERENTIAL METHOD: ABNORMAL
EOSINOPHIL # BLD AUTO: 0.3 K/UL (ref 0–0.5)
EOSINOPHIL NFR BLD: 3.5 % (ref 0–8)
ERYTHROCYTE [DISTWIDTH] IN BLOOD BY AUTOMATED COUNT: 12.9 % (ref 11.5–14.5)
EST. GFR  (AFRICAN AMERICAN): >60 ML/MIN/1.73 M^2
EST. GFR  (NON AFRICAN AMERICAN): >60 ML/MIN/1.73 M^2
GLUCOSE SERPL-MCNC: 257 MG/DL (ref 70–110)
GLUCOSE UR QL STRIP: ABNORMAL
HCT VFR BLD AUTO: 40.3 % (ref 40–54)
HGB BLD-MCNC: 13.1 G/DL (ref 14–18)
HGB UR QL STRIP: ABNORMAL
HYALINE CASTS #/AREA URNS LPF: 6 /LPF
IMM GRANULOCYTES # BLD AUTO: 0.24 K/UL (ref 0–0.04)
IMM GRANULOCYTES NFR BLD AUTO: 3.2 % (ref 0–0.5)
KETONES UR QL STRIP: NEGATIVE
LEUKOCYTE ESTERASE UR QL STRIP: NEGATIVE
LYMPHOCYTES # BLD AUTO: 1.5 K/UL (ref 1–4.8)
LYMPHOCYTES NFR BLD: 20.2 % (ref 18–48)
MCH RBC QN AUTO: 28.5 PG (ref 27–31)
MCHC RBC AUTO-ENTMCNC: 32.5 G/DL (ref 32–36)
MCV RBC AUTO: 88 FL (ref 82–98)
MICROSCOPIC COMMENT: ABNORMAL
MONOCYTES # BLD AUTO: 0.8 K/UL (ref 0.3–1)
MONOCYTES NFR BLD: 11.2 % (ref 4–15)
NEUTROPHILS # BLD AUTO: 4.5 K/UL (ref 1.8–7.7)
NEUTROPHILS NFR BLD: 60.8 % (ref 38–73)
NITRITE UR QL STRIP: NEGATIVE
NRBC BLD-RTO: 0 /100 WBC
PH UR STRIP: 6 [PH] (ref 5–8)
PLATELET # BLD AUTO: 224 K/UL (ref 150–350)
PMV BLD AUTO: 10.5 FL (ref 9.2–12.9)
POTASSIUM SERPL-SCNC: 4 MMOL/L (ref 3.5–5.1)
PROT SERPL-MCNC: 7.1 G/DL (ref 6–8.4)
PROT UR QL STRIP: ABNORMAL
RBC # BLD AUTO: 4.6 M/UL (ref 4.6–6.2)
RBC #/AREA URNS HPF: >100 /HPF (ref 0–4)
SODIUM SERPL-SCNC: 133 MMOL/L (ref 136–145)
SP GR UR STRIP: >1.03 (ref 1–1.03)
SQUAMOUS #/AREA URNS HPF: 2 /HPF
URN SPEC COLLECT METH UR: ABNORMAL
UROBILINOGEN UR STRIP-ACNC: NEGATIVE EU/DL
WBC # BLD AUTO: 7.47 K/UL (ref 3.9–12.7)
WBC #/AREA URNS HPF: 15 /HPF (ref 0–5)
YEAST URNS QL MICRO: ABNORMAL

## 2020-11-04 PROCEDURE — 87086 URINE CULTURE/COLONY COUNT: CPT

## 2020-11-04 PROCEDURE — 96376 TX/PRO/DX INJ SAME DRUG ADON: CPT

## 2020-11-04 PROCEDURE — 80053 COMPREHEN METABOLIC PANEL: CPT

## 2020-11-04 PROCEDURE — 99284 EMERGENCY DEPT VISIT MOD MDM: CPT | Mod: 25

## 2020-11-04 PROCEDURE — 25000003 PHARM REV CODE 250: Performed by: EMERGENCY MEDICINE

## 2020-11-04 PROCEDURE — 96374 THER/PROPH/DIAG INJ IV PUSH: CPT

## 2020-11-04 PROCEDURE — 63600175 PHARM REV CODE 636 W HCPCS: Performed by: EMERGENCY MEDICINE

## 2020-11-04 PROCEDURE — 81001 URINALYSIS AUTO W/SCOPE: CPT

## 2020-11-04 PROCEDURE — 85025 COMPLETE CBC W/AUTO DIFF WBC: CPT

## 2020-11-04 PROCEDURE — 36415 COLL VENOUS BLD VENIPUNCTURE: CPT

## 2020-11-04 PROCEDURE — 96375 TX/PRO/DX INJ NEW DRUG ADDON: CPT

## 2020-11-04 PROCEDURE — 96361 HYDRATE IV INFUSION ADD-ON: CPT

## 2020-11-04 RX ORDER — HYDROMORPHONE HYDROCHLORIDE 1 MG/ML
1 INJECTION, SOLUTION INTRAMUSCULAR; INTRAVENOUS; SUBCUTANEOUS
Status: DISCONTINUED | OUTPATIENT
Start: 2020-11-04 | End: 2020-11-04

## 2020-11-04 RX ORDER — ONDANSETRON 4 MG/1
4 TABLET, FILM COATED ORAL EVERY 6 HOURS PRN
Qty: 20 TABLET | Refills: 1 | Status: SHIPPED | OUTPATIENT
Start: 2020-11-04 | End: 2021-11-04

## 2020-11-04 RX ORDER — CEPHALEXIN 250 MG/1
250 CAPSULE ORAL 4 TIMES DAILY
Qty: 28 CAPSULE | Refills: 0 | Status: SHIPPED | OUTPATIENT
Start: 2020-11-04 | End: 2020-11-11

## 2020-11-04 RX ORDER — ONDANSETRON 4 MG/1
4 TABLET, FILM COATED ORAL EVERY 6 HOURS PRN
Qty: 20 TABLET | Refills: 1 | Status: SHIPPED | OUTPATIENT
Start: 2020-11-04 | End: 2020-11-04 | Stop reason: SDUPTHER

## 2020-11-04 RX ORDER — TAMSULOSIN HYDROCHLORIDE 0.4 MG/1
0.4 CAPSULE ORAL DAILY
Qty: 10 CAPSULE | Refills: 0 | Status: SHIPPED | OUTPATIENT
Start: 2020-11-04 | End: 2022-09-06

## 2020-11-04 RX ORDER — HYDROMORPHONE HYDROCHLORIDE 1 MG/ML
1 INJECTION, SOLUTION INTRAMUSCULAR; INTRAVENOUS; SUBCUTANEOUS
Status: COMPLETED | OUTPATIENT
Start: 2020-11-04 | End: 2020-11-04

## 2020-11-04 RX ORDER — ONDANSETRON 2 MG/ML
4 INJECTION INTRAMUSCULAR; INTRAVENOUS
Status: COMPLETED | OUTPATIENT
Start: 2020-11-04 | End: 2020-11-04

## 2020-11-04 RX ORDER — METFORMIN HYDROCHLORIDE 750 MG/1
1500 TABLET, EXTENDED RELEASE ORAL
Status: ON HOLD | COMMUNITY
End: 2022-08-18 | Stop reason: HOSPADM

## 2020-11-04 RX ORDER — OXYCODONE AND ACETAMINOPHEN 5; 325 MG/1; MG/1
1 TABLET ORAL EVERY 4 HOURS PRN
Qty: 12 TABLET | Refills: 0 | Status: SHIPPED | OUTPATIENT
Start: 2020-11-04 | End: 2022-09-06

## 2020-11-04 RX ADMIN — HYDROMORPHONE HYDROCHLORIDE 1 MG: 1 INJECTION, SOLUTION INTRAMUSCULAR; INTRAVENOUS; SUBCUTANEOUS at 07:11

## 2020-11-04 RX ADMIN — HYDROMORPHONE HYDROCHLORIDE 1 MG: 1 INJECTION, SOLUTION INTRAMUSCULAR; INTRAVENOUS; SUBCUTANEOUS at 10:11

## 2020-11-04 RX ADMIN — ONDANSETRON 4 MG: 2 INJECTION INTRAMUSCULAR; INTRAVENOUS at 07:11

## 2020-11-04 RX ADMIN — SODIUM CHLORIDE 1000 ML: 0.9 INJECTION, SOLUTION INTRAVENOUS at 07:11

## 2020-11-04 NOTE — ED NOTES
Pt reports he drove self to hospital and will have wife come get him once discharge, pt informed by doctor of pending pain orders, pt informed he cannot drive himself home if pain meds given, pt reports his wife will come get him and drive him home

## 2020-11-04 NOTE — DISCHARGE INSTRUCTIONS
You have a 3 mm left proximal ureteral stone.  Follow-up with urologist.  Drink lots of fluids.  Rest.  Return to emergency department for worsening symptoms or any problems

## 2020-11-04 NOTE — ED TRIAGE NOTES
"Present to the ER with c/o " pain in my lower back and blood in my urine" symptoms since yesterday afternoon, pt reports symptoms " went away" and returning this am, Hx: kidney stone, denies n/v,   "

## 2020-11-04 NOTE — ED PROVIDER NOTES
Encounter Date: 11/4/2020       History     Chief Complaint   Patient presents with    Flank Pain     L side x 1 day    Hematuria     44-year-old male presented emergency department with left flank pain which started yesterday and pain went away yesterday and started coming back again last night.  Patient also complains of having some dark colored urine and blood in urine.  Patient denies fever or chills.  Denies nausea or dysuria or for fever.        Review of patient's allergies indicates:  No Known Allergies  Past Medical History:   Diagnosis Date    Diabetes mellitus, type 2     Diverticulitis of colon     Hyperlipidemia     Hypertension     Sleep apnea      Past Surgical History:   Procedure Laterality Date    ABDOMINAL SURGERY      APPENDECTOMY      COLON SURGERY      HERNIA REPAIR       Family History   Family history unknown: Yes     Social History     Tobacco Use    Smoking status: Former Smoker     Packs/day: 0.50     Years: 10.00     Pack years: 5.00     Types: Cigarettes    Smokeless tobacco: Never Used   Substance Use Topics    Alcohol use: No     Comment: ocassionally    Drug use: No     Review of Systems   Constitutional: Negative.    HENT: Negative.    Eyes: Negative.    Respiratory: Negative.    Cardiovascular: Negative.    Gastrointestinal: Negative.  Negative for abdominal pain, blood in stool, constipation, diarrhea, nausea, rectal pain and vomiting.   Endocrine: Negative.    Genitourinary: Positive for flank pain and hematuria.   Skin: Negative.    Allergic/Immunologic: Negative.    Neurological: Negative.    Hematological: Negative.    Psychiatric/Behavioral: Negative.  Negative for agitation.   All other systems reviewed and are negative.      Physical Exam     Initial Vitals [11/04/20 0727]   BP Pulse Resp Temp SpO2   (!) 177/79 77 16 98.4 °F (36.9 °C) 99 %      MAP       --         Physical Exam    Nursing note and vitals reviewed.  Constitutional: He appears well-developed  and well-nourished.   HENT:   Head: Normocephalic and atraumatic.   Nose: Nose normal.   Eyes: Conjunctivae and EOM are normal.   Neck: Normal range of motion. No tracheal deviation present.   Cardiovascular: Normal rate, regular rhythm, normal heart sounds and intact distal pulses. Exam reveals no friction rub.    No murmur heard.  Pulmonary/Chest: Breath sounds normal. No respiratory distress. He has no wheezes. He has no rales.   Abdominal: Soft. He exhibits no distension. There is no abdominal tenderness.   Left flank tenderness   Musculoskeletal: Normal range of motion. No tenderness.   Neurological: He is alert and oriented to person, place, and time. He has normal strength. No sensory deficit. GCS score is 15. GCS eye subscore is 4. GCS verbal subscore is 5. GCS motor subscore is 6.   Skin: Skin is warm and dry. Capillary refill takes less than 2 seconds.   Psychiatric: He has a normal mood and affect. Thought content normal.         ED Course   Procedures  Labs Reviewed   CBC W/ AUTO DIFFERENTIAL - Abnormal; Notable for the following components:       Result Value    Hemoglobin 13.1 (*)     Immature Granulocytes 3.2 (*)     Immature Grans (Abs) 0.24 (*)     All other components within normal limits   COMPREHENSIVE METABOLIC PANEL - Abnormal; Notable for the following components:    Sodium 133 (*)     Glucose 257 (*)     All other components within normal limits   URINALYSIS, REFLEX TO URINE CULTURE    Narrative:     Recoll. [id number] by [email or web address] at 11/04/2020 09:28, reason: specimen   marked as collected by nurse but was not physically collected  Recoll. [id number] by [email or web address] at 11/04/2020 09:28, reason: specimen   marked as collected by nurse but was not physically collected   URINALYSIS MICROSCOPIC    Narrative:     Recoll. [id number] by [email or web address] at 11/04/2020 09:28, reason: specimen   marked as collected by nurse but was not physically collected          Imaging Results          CT Renal Stone Study ABD Pelvis WO  (Final result)  Result time 11/04/20 08:14:10    Final result by Phoenix Griffin IV, MD (11/04/20 08:14:10)                 Narrative:    CMS MANDATED QUALITY DATA - CT RADIATION 436    All CT scans at this facility utilize dose modulation, iterative  reconstruction, and/or weight based dosing when appropriate to reduce  radiation dose to as low as reasonably achievable.    CT of the abdomen without contrast and CT of the pelvis without  contrast    HISTORY: Flank pain and hematuria.    Comparison is made to 3/11/2018.    There is a 3 mm calculus present within the left-sided collecting  system at the junction of the renal pelvis and ureter. This does not  result and significant hydronephrosis. No additional or renal or  ureteral calculi are identified. A focal area of cortical scarring  within the mid left kidney is unchanged. No suspicious mass is  identified.    The unenhanced liver and spleen demonstrate no focal parenchymal  abnormalities. The liver is borderline enlarged. The gallbladder,  biliary system and pancreas appear unremarkable. There is a stable  left adrenal nodule. The right adrenal gland is unremarkable.    The aorta tapers appropriately with scattered atheromatous changes  present in the wall of the aorta and branches.    There is no bowel wall thickening or evidence of obstruction. No  adenopathy or free fluid is observed. Surgical changes are noted  within the abdomen. Linearly oriented areas of mineralization within  the anterior aspect of the mid and lower abdomen are likely related to  prior surgery. There is a defect within the right lower quadrant  abdominal wall which may represent site of previous ostomy and is  unchanged.    The urinary bladder is incompletely distended and unopacified but  grossly unremarkable. No pelvic masses are identified. Mildly  prominent external iliac chain and inguinal lymph nodes bilaterally  are stable.    The visualized lung bases are appropriately aerated.  No acute osseous  abnormality is identified.    IMPRESSION:    3 mm left sided calculus at the level of the ureteropelvic junction  without significant hydronephrosis.    Multiple incidental observations as detailed above.    Electronically Signed by Phoenix Griffin M.D. on 11/4/2020 8:29 AM                               Medical Decision Making:   Differential Diagnosis:   Patient with intermittent episodes of left flank pain.  Patient does have evidence of left UVJ stone and as having hematuria CT scan done which showed a kidney stone.  Patient's pain improved with treatment and as symptoms improved instructions given and given the size of the stone should be able to pass.  Patient follow-up with urologist.  Discharged with instructions and follow up with primary care and Urology  Clinical Tests:   Lab Tests: Reviewed  Radiological Study: Reviewed                             Clinical Impression:     ICD-10-CM ICD-9-CM   1. Kidney stone  N20.0 592.0                          ED Disposition Condition    Discharge Stable        ED Prescriptions     Medication Sig Dispense Start Date End Date Auth. Provider    oxyCODONE-acetaminophen (PERCOCET) 5-325 mg per tablet Take 1 tablet by mouth every 4 (four) hours as needed for Pain. 12 tablet 11/4/2020  Jaclyn Ponce MD    tamsulosin (FLOMAX) 0.4 mg Cap Take 1 capsule (0.4 mg total) by mouth once daily. for 10 days 10 capsule 11/4/2020 11/14/2020 Jaclyn Ponce MD    ondansetron (ZOFRAN) 4 MG tablet Take 1 tablet (4 mg total) by mouth every 6 (six) hours as needed. 20 tablet 11/4/2020 11/4/2021 Jaclyn Ponce MD        Follow-up Information     Follow up With Specialties Details Why Contact Info    Davy uQevedo MD Internal Medicine In 2 days  [address]  [place] [address]      Keo Arora MD Urology In 2 days  [address] [place] [address]   [address]  [facility] [id number] [phone]                                         Jaclyn Ponce MD  11/04/20 105

## 2020-11-06 LAB
BACTERIA UR CULT: NORMAL
BACTERIA UR CULT: NORMAL

## 2021-03-15 ENCOUNTER — OFFICE VISIT (OUTPATIENT)
Dept: URGENT CARE | Facility: CLINIC | Age: 46
End: 2021-03-15
Payer: MEDICAID

## 2021-03-15 VITALS
BODY MASS INDEX: 47.74 KG/M2 | WEIGHT: 315 LBS | HEART RATE: 94 BPM | HEIGHT: 68 IN | DIASTOLIC BLOOD PRESSURE: 81 MMHG | OXYGEN SATURATION: 95 % | TEMPERATURE: 98 F | SYSTOLIC BLOOD PRESSURE: 140 MMHG

## 2021-03-15 DIAGNOSIS — J01.90 ACUTE NON-RECURRENT SINUSITIS, UNSPECIFIED LOCATION: Primary | ICD-10-CM

## 2021-03-15 PROCEDURE — 99214 OFFICE O/P EST MOD 30 MIN: CPT | Mod: 25,S$GLB,, | Performed by: EMERGENCY MEDICINE

## 2021-03-15 PROCEDURE — 99214 PR OFFICE/OUTPT VISIT, EST, LEVL IV, 30-39 MIN: ICD-10-PCS | Mod: 25,S$GLB,, | Performed by: EMERGENCY MEDICINE

## 2021-03-15 RX ORDER — DEXAMETHASONE SODIUM PHOSPHATE 4 MG/ML
8 INJECTION, SOLUTION INTRA-ARTICULAR; INTRALESIONAL; INTRAMUSCULAR; INTRAVENOUS; SOFT TISSUE
Status: COMPLETED | OUTPATIENT
Start: 2021-03-15 | End: 2021-03-15

## 2021-03-15 RX ORDER — DEXCHLORPHENIRAMINE MALEATE, DEXTROMETHORPHAN HBR, PHENYLEPHRINE HCL 1; 10; 5 MG/5ML; MG/5ML; MG/5ML
5 SYRUP ORAL EVERY 4 HOURS PRN
Qty: 240 ML | Refills: 0 | Status: SHIPPED | OUTPATIENT
Start: 2021-03-15 | End: 2022-09-06

## 2021-03-15 RX ORDER — CETIRIZINE HYDROCHLORIDE 10 MG/1
10 TABLET ORAL DAILY
Qty: 30 TABLET | Refills: 0 | Status: SHIPPED | OUTPATIENT
Start: 2021-03-15 | End: 2021-04-14

## 2021-03-15 RX ORDER — PREDNISONE 20 MG/1
20 TABLET ORAL 2 TIMES DAILY
Qty: 10 TABLET | Refills: 0 | Status: ON HOLD | OUTPATIENT
Start: 2021-03-15 | End: 2022-08-18 | Stop reason: HOSPADM

## 2021-03-15 RX ADMIN — DEXAMETHASONE SODIUM PHOSPHATE 8 MG: 4 INJECTION, SOLUTION INTRA-ARTICULAR; INTRALESIONAL; INTRAMUSCULAR; INTRAVENOUS; SOFT TISSUE at 01:03

## 2021-08-27 ENCOUNTER — LAB VISIT (OUTPATIENT)
Dept: URGENT CARE | Facility: CLINIC | Age: 46
End: 2021-08-27
Payer: MEDICAID

## 2021-08-27 DIAGNOSIS — Z20.822 EXPOSURE TO COVID-19 VIRUS: ICD-10-CM

## 2021-08-27 PROCEDURE — U0003 INFECTIOUS AGENT DETECTION BY NUCLEIC ACID (DNA OR RNA); SEVERE ACUTE RESPIRATORY SYNDROME CORONAVIRUS 2 (SARS-COV-2) (CORONAVIRUS DISEASE [COVID-19]), AMPLIFIED PROBE TECHNIQUE, MAKING USE OF HIGH THROUGHPUT TECHNOLOGIES AS DESCRIBED BY CMS-2020-01-R: HCPCS | Performed by: EMERGENCY MEDICINE

## 2021-08-27 PROCEDURE — U0005 INFEC AGEN DETEC AMPLI PROBE: HCPCS | Performed by: EMERGENCY MEDICINE

## 2021-08-28 LAB
SARS-COV-2 RNA RESP QL NAA+PROBE: NOT DETECTED
SARS-COV-2- CYCLE NUMBER: NORMAL

## 2022-08-17 ENCOUNTER — HOSPITAL ENCOUNTER (INPATIENT)
Facility: HOSPITAL | Age: 47
LOS: 1 days | Discharge: HOME | DRG: 178 | End: 2022-08-18
Attending: EMERGENCY MEDICINE | Admitting: INTERNAL MEDICINE
Payer: MEDICAID

## 2022-08-17 DIAGNOSIS — J96.01 ACUTE RESPIRATORY FAILURE WITH HYPOXIA: Primary | ICD-10-CM

## 2022-08-17 DIAGNOSIS — R07.9 CHEST PAIN: ICD-10-CM

## 2022-08-17 DIAGNOSIS — U07.1 COVID-19: ICD-10-CM

## 2022-08-17 LAB
ALBUMIN SERPL BCP-MCNC: 4 G/DL (ref 3.5–5.2)
ALP SERPL-CCNC: 95 U/L (ref 55–135)
ALT SERPL W/O P-5'-P-CCNC: 97 U/L (ref 10–44)
ANION GAP SERPL CALC-SCNC: 10 MMOL/L (ref 8–16)
AST SERPL-CCNC: 51 U/L (ref 10–40)
BASOPHILS # BLD AUTO: 0.1 K/UL (ref 0–0.2)
BASOPHILS NFR BLD: 1.1 % (ref 0–1.9)
BILIRUB SERPL-MCNC: 1 MG/DL (ref 0.1–1)
BNP SERPL-MCNC: 78 PG/ML (ref 0–99)
BUN SERPL-MCNC: 9 MG/DL (ref 6–20)
CALCIUM SERPL-MCNC: 8.6 MG/DL (ref 8.7–10.5)
CHLORIDE SERPL-SCNC: 103 MMOL/L (ref 95–110)
CO2 SERPL-SCNC: 22 MMOL/L (ref 23–29)
CREAT SERPL-MCNC: 0.7 MG/DL (ref 0.5–1.4)
D DIMER PPP IA.FEU-MCNC: 0.29 UG/ML FEU
DIFFERENTIAL METHOD: ABNORMAL
EOSINOPHIL # BLD AUTO: 0.2 K/UL (ref 0–0.5)
EOSINOPHIL NFR BLD: 1.6 % (ref 0–8)
ERYTHROCYTE [DISTWIDTH] IN BLOOD BY AUTOMATED COUNT: 12.7 % (ref 11.5–14.5)
EST. GFR  (NO RACE VARIABLE): >60 ML/MIN/1.73 M^2
GLUCOSE SERPL-MCNC: 248 MG/DL (ref 70–110)
GLUCOSE SERPL-MCNC: 249 MG/DL (ref 70–110)
HCT VFR BLD AUTO: 40.4 % (ref 40–54)
HGB BLD-MCNC: 13.7 G/DL (ref 14–18)
IMM GRANULOCYTES # BLD AUTO: 0.34 K/UL (ref 0–0.04)
IMM GRANULOCYTES NFR BLD AUTO: 3.7 % (ref 0–0.5)
INR PPP: 1.1
LYMPHOCYTES # BLD AUTO: 1.6 K/UL (ref 1–4.8)
LYMPHOCYTES NFR BLD: 17.6 % (ref 18–48)
MCH RBC QN AUTO: 31.3 PG (ref 27–31)
MCHC RBC AUTO-ENTMCNC: 33.9 G/DL (ref 32–36)
MCV RBC AUTO: 92 FL (ref 82–98)
MONOCYTES # BLD AUTO: 0.9 K/UL (ref 0.3–1)
MONOCYTES NFR BLD: 10 % (ref 4–15)
NEUTROPHILS # BLD AUTO: 6 K/UL (ref 1.8–7.7)
NEUTROPHILS NFR BLD: 66 % (ref 38–73)
NRBC BLD-RTO: 0 /100 WBC
PLATELET # BLD AUTO: 234 K/UL (ref 150–450)
PMV BLD AUTO: 11 FL (ref 9.2–12.9)
POTASSIUM SERPL-SCNC: 4.3 MMOL/L (ref 3.5–5.1)
PROT SERPL-MCNC: 7.1 G/DL (ref 6–8.4)
PROTHROMBIN TIME: 13.3 SEC (ref 11.4–13.7)
RBC # BLD AUTO: 4.38 M/UL (ref 4.6–6.2)
SARS-COV-2 RDRP RESP QL NAA+PROBE: POSITIVE
SODIUM SERPL-SCNC: 135 MMOL/L (ref 136–145)
TROPONIN I SERPL DL<=0.01 NG/ML-MCNC: <0.03 NG/ML
WBC # BLD AUTO: 9.16 K/UL (ref 3.9–12.7)

## 2022-08-17 PROCEDURE — 36600 WITHDRAWAL OF ARTERIAL BLOOD: CPT

## 2022-08-17 PROCEDURE — 84484 ASSAY OF TROPONIN QUANT: CPT | Performed by: STUDENT IN AN ORGANIZED HEALTH CARE EDUCATION/TRAINING PROGRAM

## 2022-08-17 PROCEDURE — 25500020 PHARM REV CODE 255: Performed by: EMERGENCY MEDICINE

## 2022-08-17 PROCEDURE — 85025 COMPLETE CBC W/AUTO DIFF WBC: CPT | Performed by: STUDENT IN AN ORGANIZED HEALTH CARE EDUCATION/TRAINING PROGRAM

## 2022-08-17 PROCEDURE — 82962 GLUCOSE BLOOD TEST: CPT

## 2022-08-17 PROCEDURE — 93010 ELECTROCARDIOGRAM REPORT: CPT | Mod: ,,, | Performed by: SPECIALIST

## 2022-08-17 PROCEDURE — 85379 FIBRIN DEGRADATION QUANT: CPT | Performed by: EMERGENCY MEDICINE

## 2022-08-17 PROCEDURE — 82803 BLOOD GASES ANY COMBINATION: CPT

## 2022-08-17 PROCEDURE — 85610 PROTHROMBIN TIME: CPT | Performed by: STUDENT IN AN ORGANIZED HEALTH CARE EDUCATION/TRAINING PROGRAM

## 2022-08-17 PROCEDURE — 80053 COMPREHEN METABOLIC PANEL: CPT | Performed by: STUDENT IN AN ORGANIZED HEALTH CARE EDUCATION/TRAINING PROGRAM

## 2022-08-17 PROCEDURE — 93010 EKG 12-LEAD: ICD-10-PCS | Mod: ,,, | Performed by: SPECIALIST

## 2022-08-17 PROCEDURE — 83880 ASSAY OF NATRIURETIC PEPTIDE: CPT | Performed by: STUDENT IN AN ORGANIZED HEALTH CARE EDUCATION/TRAINING PROGRAM

## 2022-08-17 PROCEDURE — 93005 ELECTROCARDIOGRAM TRACING: CPT | Performed by: SPECIALIST

## 2022-08-17 PROCEDURE — 99900035 HC TECH TIME PER 15 MIN (STAT)

## 2022-08-17 PROCEDURE — 99285 EMERGENCY DEPT VISIT HI MDM: CPT | Mod: 25

## 2022-08-17 PROCEDURE — U0002 COVID-19 LAB TEST NON-CDC: HCPCS | Performed by: EMERGENCY MEDICINE

## 2022-08-17 RX ADMIN — IOHEXOL 100 ML: 350 INJECTION, SOLUTION INTRAVENOUS at 10:08

## 2022-08-18 VITALS
OXYGEN SATURATION: 95 % | HEART RATE: 89 BPM | BODY MASS INDEX: 47.74 KG/M2 | SYSTOLIC BLOOD PRESSURE: 135 MMHG | HEIGHT: 68 IN | DIASTOLIC BLOOD PRESSURE: 77 MMHG | WEIGHT: 315 LBS | TEMPERATURE: 98 F | RESPIRATION RATE: 18 BRPM

## 2022-08-18 DIAGNOSIS — U07.1 COVID-19 VIRUS DETECTED: ICD-10-CM

## 2022-08-18 PROBLEM — R09.02 HYPOXIA: Status: ACTIVE | Noted: 2022-08-18

## 2022-08-18 PROBLEM — G47.33 OSA (OBSTRUCTIVE SLEEP APNEA): Status: ACTIVE | Noted: 2022-08-18

## 2022-08-18 PROBLEM — E11.9 DIABETES: Status: ACTIVE | Noted: 2022-08-18

## 2022-08-18 PROBLEM — I10 HYPERTENSION: Status: ACTIVE | Noted: 2022-08-18

## 2022-08-18 PROBLEM — R07.89 CHEST PRESSURE: Status: ACTIVE | Noted: 2022-08-18

## 2022-08-18 LAB
25(OH)D3+25(OH)D2 SERPL-MCNC: >120 NG/ML (ref 30–96)
ALLENS TEST: ABNORMAL
ANION GAP SERPL CALC-SCNC: 17 MMOL/L (ref 8–16)
BACTERIA #/AREA URNS HPF: NEGATIVE /HPF
BASOPHILS # BLD AUTO: 0.11 K/UL (ref 0–0.2)
BASOPHILS NFR BLD: 1.3 % (ref 0–1.9)
BILIRUB UR QL STRIP: NEGATIVE
BUN SERPL-MCNC: 10 MG/DL (ref 6–20)
CALCIUM SERPL-MCNC: 8.4 MG/DL (ref 8.7–10.5)
CHLORIDE SERPL-SCNC: 101 MMOL/L (ref 95–110)
CLARITY UR: CLEAR
CO2 SERPL-SCNC: 21 MMOL/L (ref 23–29)
COLOR UR: YELLOW
CREAT SERPL-MCNC: 0.7 MG/DL (ref 0.5–1.4)
DELSYS: ABNORMAL
DIFFERENTIAL METHOD: ABNORMAL
EOSINOPHIL # BLD AUTO: 0.1 K/UL (ref 0–0.5)
EOSINOPHIL NFR BLD: 0.6 % (ref 0–8)
ERYTHROCYTE [DISTWIDTH] IN BLOOD BY AUTOMATED COUNT: 13.1 % (ref 11.5–14.5)
EST. GFR  (NO RACE VARIABLE): >60 ML/MIN/1.73 M^2
ESTIMATED AVG GLUCOSE: 220 MG/DL (ref 68–131)
FIO2: 21
GLUCOSE SERPL-MCNC: 190 MG/DL (ref 70–110)
GLUCOSE SERPL-MCNC: 273 MG/DL (ref 70–110)
GLUCOSE SERPL-MCNC: 274 MG/DL (ref 70–110)
GLUCOSE UR QL STRIP: ABNORMAL
HBA1C MFR BLD: 9.3 % (ref 4.5–6.2)
HCO3 UR-SCNC: 23.6 MMOL/L (ref 24–28)
HCT VFR BLD AUTO: 41.3 % (ref 40–54)
HCT VFR BLD CALC: 40 %PCV (ref 36–54)
HGB BLD-MCNC: 13.6 G/DL (ref 14–18)
HGB UR QL STRIP: NEGATIVE
HYALINE CASTS #/AREA URNS LPF: 0 /LPF
IMM GRANULOCYTES # BLD AUTO: 0.39 K/UL (ref 0–0.04)
IMM GRANULOCYTES NFR BLD AUTO: 4.6 % (ref 0–0.5)
KETONES UR QL STRIP: ABNORMAL
LEUKOCYTE ESTERASE UR QL STRIP: NEGATIVE
LYMPHOCYTES # BLD AUTO: 0.9 K/UL (ref 1–4.8)
LYMPHOCYTES NFR BLD: 10.8 % (ref 18–48)
MAGNESIUM SERPL-MCNC: 1.7 MG/DL (ref 1.6–2.6)
MCH RBC QN AUTO: 31.2 PG (ref 27–31)
MCHC RBC AUTO-ENTMCNC: 32.9 G/DL (ref 32–36)
MCV RBC AUTO: 95 FL (ref 82–98)
MICROSCOPIC COMMENT: NORMAL
MODE: ABNORMAL
MONOCYTES # BLD AUTO: 0.4 K/UL (ref 0.3–1)
MONOCYTES NFR BLD: 4.3 % (ref 4–15)
NEUTROPHILS # BLD AUTO: 6.6 K/UL (ref 1.8–7.7)
NEUTROPHILS NFR BLD: 78.4 % (ref 38–73)
NITRITE UR QL STRIP: NEGATIVE
NRBC BLD-RTO: 0 /100 WBC
PCO2 BLDA: 39.9 MMHG (ref 35–45)
PEEP: 18
PH SMN: 7.38 [PH] (ref 7.35–7.45)
PH UR STRIP: 6 [PH] (ref 5–8)
PHOSPHATE SERPL-MCNC: 2.8 MG/DL (ref 2.7–4.5)
PLATELET # BLD AUTO: 233 K/UL (ref 150–450)
PMV BLD AUTO: 10.9 FL (ref 9.2–12.9)
PO2 BLDA: 77 MMHG (ref 80–100)
POC BE: -2 MMOL/L
POC IONIZED CALCIUM: 1.2 MMOL/L (ref 1.06–1.42)
POC SATURATED O2: 95 % (ref 95–100)
POC TCO2: 25 MMOL/L (ref 23–27)
POTASSIUM BLD-SCNC: 4.3 MMOL/L (ref 3.5–5.1)
POTASSIUM SERPL-SCNC: 4.2 MMOL/L (ref 3.5–5.1)
PROT UR QL STRIP: NEGATIVE
RBC # BLD AUTO: 4.36 M/UL (ref 4.6–6.2)
RBC #/AREA URNS HPF: 0 /HPF (ref 0–4)
SAMPLE: ABNORMAL
SITE: ABNORMAL
SODIUM BLD-SCNC: 138 MMOL/L (ref 136–145)
SODIUM SERPL-SCNC: 139 MMOL/L (ref 136–145)
SP GR UR STRIP: 1.02 (ref 1–1.03)
SQUAMOUS #/AREA URNS HPF: 0 /HPF
TROPONIN I SERPL DL<=0.01 NG/ML-MCNC: <0.03 NG/ML
URN SPEC COLLECT METH UR: ABNORMAL
UROBILINOGEN UR STRIP-ACNC: NEGATIVE EU/DL
WBC # BLD AUTO: 8.46 K/UL (ref 3.9–12.7)
WBC #/AREA URNS HPF: 0 /HPF (ref 0–5)
YEAST URNS QL MICRO: NORMAL

## 2022-08-18 PROCEDURE — 25000003 PHARM REV CODE 250: Performed by: EMERGENCY MEDICINE

## 2022-08-18 PROCEDURE — 25000003 PHARM REV CODE 250: Performed by: INTERNAL MEDICINE

## 2022-08-18 PROCEDURE — 63600175 PHARM REV CODE 636 W HCPCS: Performed by: EMERGENCY MEDICINE

## 2022-08-18 PROCEDURE — 21400001 HC TELEMETRY ROOM

## 2022-08-18 PROCEDURE — 96374 THER/PROPH/DIAG INJ IV PUSH: CPT

## 2022-08-18 PROCEDURE — 85025 COMPLETE CBC W/AUTO DIFF WBC: CPT | Performed by: INTERNAL MEDICINE

## 2022-08-18 PROCEDURE — 83735 ASSAY OF MAGNESIUM: CPT | Performed by: INTERNAL MEDICINE

## 2022-08-18 PROCEDURE — 25000242 PHARM REV CODE 250 ALT 637 W/ HCPCS: Performed by: INTERNAL MEDICINE

## 2022-08-18 PROCEDURE — 81001 URINALYSIS AUTO W/SCOPE: CPT | Performed by: INTERNAL MEDICINE

## 2022-08-18 PROCEDURE — 82306 VITAMIN D 25 HYDROXY: CPT | Performed by: INTERNAL MEDICINE

## 2022-08-18 PROCEDURE — 83036 HEMOGLOBIN GLYCOSYLATED A1C: CPT | Performed by: INTERNAL MEDICINE

## 2022-08-18 PROCEDURE — 80048 BASIC METABOLIC PNL TOTAL CA: CPT | Performed by: INTERNAL MEDICINE

## 2022-08-18 PROCEDURE — 84100 ASSAY OF PHOSPHORUS: CPT | Performed by: INTERNAL MEDICINE

## 2022-08-18 PROCEDURE — 84484 ASSAY OF TROPONIN QUANT: CPT | Performed by: INTERNAL MEDICINE

## 2022-08-18 PROCEDURE — 63600175 PHARM REV CODE 636 W HCPCS: Performed by: INTERNAL MEDICINE

## 2022-08-18 RX ORDER — ASPIRIN 325 MG
325 TABLET ORAL
Status: COMPLETED | OUTPATIENT
Start: 2022-08-18 | End: 2022-08-18

## 2022-08-18 RX ORDER — ATORVASTATIN CALCIUM 40 MG/1
80 TABLET, FILM COATED ORAL NIGHTLY
Status: DISCONTINUED | OUTPATIENT
Start: 2022-08-18 | End: 2022-08-18 | Stop reason: HOSPADM

## 2022-08-18 RX ORDER — FAMOTIDINE 20 MG/1
20 TABLET, FILM COATED ORAL 2 TIMES DAILY
Status: DISCONTINUED | OUTPATIENT
Start: 2022-08-18 | End: 2022-08-18 | Stop reason: HOSPADM

## 2022-08-18 RX ORDER — NITROGLYCERIN 0.4 MG/1
0.4 TABLET SUBLINGUAL EVERY 5 MIN PRN
Status: DISCONTINUED | OUTPATIENT
Start: 2022-08-18 | End: 2022-08-18 | Stop reason: HOSPADM

## 2022-08-18 RX ORDER — IBUPROFEN 200 MG
24 TABLET ORAL
Status: DISCONTINUED | OUTPATIENT
Start: 2022-08-18 | End: 2022-08-18 | Stop reason: HOSPADM

## 2022-08-18 RX ORDER — LANOLIN ALCOHOL/MO/W.PET/CERES
800 CREAM (GRAM) TOPICAL
Status: DISCONTINUED | OUTPATIENT
Start: 2022-08-18 | End: 2022-08-18 | Stop reason: HOSPADM

## 2022-08-18 RX ORDER — SODIUM CHLORIDE 0.9 % (FLUSH) 0.9 %
2 SYRINGE (ML) INJECTION EVERY 6 HOURS PRN
Status: DISCONTINUED | OUTPATIENT
Start: 2022-08-18 | End: 2022-08-18 | Stop reason: HOSPADM

## 2022-08-18 RX ORDER — ENOXAPARIN SODIUM 100 MG/ML
40 INJECTION SUBCUTANEOUS EVERY 24 HOURS
Status: DISCONTINUED | OUTPATIENT
Start: 2022-08-18 | End: 2022-08-18

## 2022-08-18 RX ORDER — ACETAMINOPHEN 325 MG/1
650 TABLET ORAL EVERY 4 HOURS PRN
Status: DISCONTINUED | OUTPATIENT
Start: 2022-08-18 | End: 2022-08-18 | Stop reason: HOSPADM

## 2022-08-18 RX ORDER — SODIUM,POTASSIUM PHOSPHATES 280-250MG
2 POWDER IN PACKET (EA) ORAL
Status: DISCONTINUED | OUTPATIENT
Start: 2022-08-18 | End: 2022-08-18 | Stop reason: HOSPADM

## 2022-08-18 RX ORDER — ENOXAPARIN SODIUM 100 MG/ML
40 INJECTION SUBCUTANEOUS EVERY 12 HOURS
Status: DISCONTINUED | OUTPATIENT
Start: 2022-08-18 | End: 2022-08-18 | Stop reason: HOSPADM

## 2022-08-18 RX ORDER — GLUCAGON 1 MG
1 KIT INJECTION
Status: DISCONTINUED | OUTPATIENT
Start: 2022-08-18 | End: 2022-08-18 | Stop reason: HOSPADM

## 2022-08-18 RX ORDER — LOSARTAN POTASSIUM 50 MG/1
50 TABLET ORAL DAILY
Status: DISCONTINUED | OUTPATIENT
Start: 2022-08-18 | End: 2022-08-18 | Stop reason: HOSPADM

## 2022-08-18 RX ORDER — TAMSULOSIN HYDROCHLORIDE 0.4 MG/1
0.4 CAPSULE ORAL DAILY
Status: DISCONTINUED | OUTPATIENT
Start: 2022-08-18 | End: 2022-08-18 | Stop reason: HOSPADM

## 2022-08-18 RX ORDER — INSULIN ASPART 100 [IU]/ML
0-5 INJECTION, SOLUTION INTRAVENOUS; SUBCUTANEOUS
Status: DISCONTINUED | OUTPATIENT
Start: 2022-08-18 | End: 2022-08-18 | Stop reason: HOSPADM

## 2022-08-18 RX ORDER — ATORVASTATIN CALCIUM 20 MG/1
20 TABLET, FILM COATED ORAL DAILY
Status: DISCONTINUED | OUTPATIENT
Start: 2022-08-18 | End: 2022-08-18

## 2022-08-18 RX ORDER — ONDANSETRON 2 MG/ML
4 INJECTION INTRAMUSCULAR; INTRAVENOUS EVERY 8 HOURS PRN
Status: DISCONTINUED | OUTPATIENT
Start: 2022-08-18 | End: 2022-08-18 | Stop reason: HOSPADM

## 2022-08-18 RX ORDER — DEXAMETHASONE SODIUM PHOSPHATE 4 MG/ML
6 INJECTION, SOLUTION INTRA-ARTICULAR; INTRALESIONAL; INTRAMUSCULAR; INTRAVENOUS; SOFT TISSUE
Status: COMPLETED | OUTPATIENT
Start: 2022-08-18 | End: 2022-08-18

## 2022-08-18 RX ORDER — FLUTICASONE PROPIONATE 50 MCG
1 SPRAY, SUSPENSION (ML) NASAL DAILY
Status: DISCONTINUED | OUTPATIENT
Start: 2022-08-18 | End: 2022-08-18 | Stop reason: HOSPADM

## 2022-08-18 RX ORDER — DEXAMETHASONE SODIUM PHOSPHATE 4 MG/ML
6 INJECTION, SOLUTION INTRA-ARTICULAR; INTRALESIONAL; INTRAMUSCULAR; INTRAVENOUS; SOFT TISSUE EVERY 24 HOURS
Status: DISCONTINUED | OUTPATIENT
Start: 2022-08-18 | End: 2022-08-18 | Stop reason: HOSPADM

## 2022-08-18 RX ORDER — NAPROXEN SODIUM 220 MG/1
81 TABLET, FILM COATED ORAL DAILY
Status: DISCONTINUED | OUTPATIENT
Start: 2022-08-18 | End: 2022-08-18 | Stop reason: HOSPADM

## 2022-08-18 RX ORDER — CHOLECALCIFEROL (VITAMIN D3) 50 MCG
2000 TABLET ORAL DAILY
Status: DISCONTINUED | OUTPATIENT
Start: 2022-08-18 | End: 2022-08-18 | Stop reason: HOSPADM

## 2022-08-18 RX ORDER — IBUPROFEN 200 MG
16 TABLET ORAL
Status: DISCONTINUED | OUTPATIENT
Start: 2022-08-18 | End: 2022-08-18 | Stop reason: HOSPADM

## 2022-08-18 RX ADMIN — ASPIRIN 325 MG ORAL TABLET 325 MG: 325 PILL ORAL at 01:08

## 2022-08-18 RX ADMIN — FAMOTIDINE 20 MG: 20 TABLET, FILM COATED ORAL at 08:08

## 2022-08-18 RX ADMIN — NITROGLYCERIN 0.4 MG: 0.4 TABLET SUBLINGUAL at 01:08

## 2022-08-18 RX ADMIN — Medication 2000 UNITS: at 05:08

## 2022-08-18 RX ADMIN — DEXAMETHASONE SODIUM PHOSPHATE 6 MG: 4 INJECTION, SOLUTION INTRA-ARTICULAR; INTRALESIONAL; INTRAMUSCULAR; INTRAVENOUS; SOFT TISSUE at 01:08

## 2022-08-18 RX ADMIN — DEXAMETHASONE SODIUM PHOSPHATE 6 MG: 4 INJECTION, SOLUTION INTRA-ARTICULAR; INTRALESIONAL; INTRAMUSCULAR; INTRAVENOUS; SOFT TISSUE at 08:08

## 2022-08-18 RX ADMIN — ENOXAPARIN SODIUM 40 MG: 40 INJECTION SUBCUTANEOUS at 08:08

## 2022-08-18 RX ADMIN — ASPIRIN 81 MG 81 MG: 81 TABLET ORAL at 08:08

## 2022-08-18 RX ADMIN — REMDESIVIR 200 MG: 100 INJECTION, POWDER, LYOPHILIZED, FOR SOLUTION INTRAVENOUS at 05:08

## 2022-08-18 NOTE — NURSING
Patient discharged per orders.  All medications reviewed with patient, patient and wife verbalized understanding. IV discontinued, Tele box discontinued.  Patient ambulated out of facility with wife by his side.  All belongings with patient.

## 2022-08-18 NOTE — PLAN OF CARE
Met with patient at bedside to complete initial assessment      [facility]  Initial Discharge Assessment       Primary Care Provider: Davy Quevedo MD    Admission Diagnosis: Acute respiratory failure with hypoxia [J96.01]    Admission Date: 8/17/2022  Expected Discharge Date: 8/18/2022    Discharge Barriers Identified: (P) None    Payor: MEDICAID / Plan: [facility] COMMUNITY PLAN [facility] ([place] MEDICA[place] / Product Type: Managed Medicaid /     Extended Emergency Contact Information  Primary Emergency Contact: Roxie Soriano   [facility]  Home Phone: [phone]  Relation: Spouse    Discharge Plan A: (P) Home, Home with family  Discharge Plan B: (P) Home, Home with family      [id number] [address] AT [facility] [address] [facility]  [address] [facility] [address]  Phone: [phone] Fax: [phone]      Initial Assessment (most recent)       Adult Discharge Assessment - 08/18/22 1556          Discharge Assessment    Assessment Type Discharge Planning Assessment (P)      Confirmed/corrected address, phone number and insurance Yes (P)      Confirmed Demographics Correct on Facesheet (P)      Source of Information patient (P)      When was your last doctors appointment? -- (P)    unknown    Communicated EFFEI with patient/caregiver Yes (P)      Reason For Admission hypoxia (P)      Lives With spouse (P)      Facility Arrived From: home (P)      Do you expect to return to your current living situation? Yes (P)      Do you have help at home or someone to help you manage your care at home? Yes (P)      Who are your caregiver(s) and their phone number(s)? ChristieRoxie (Spouse)   [phone] (Home Phone) (P)      Prior to hospitilization cognitive status: Unable to Assess (P)      Current cognitive status: Alert/Oriented (P)      Walking or Climbing Stairs Difficulty none (P)      Dressing/Bathing Difficulty none (P)      Equipment Currently Used at Home  none (P)      Readmission within 30 days? No (P)      Patient currently being followed by outpatient case management? No (P)      Do you currently have service(s) that help you manage your care at home? No (P)      Do you take prescription medications? Yes (P)      Do you have prescription coverage? Yes (P)      Coverage Medicaid (P)      Do you have any problems affording any of your prescribed medications? No (P)      Is the patient taking medications as prescribed? yes (P)      Who is going to help you get home at discharge? Roxie Soriano (Spouse)   [phone] (Home Phone) (P)      How do you get to doctors appointments? car, drives self (P)      Are you on dialysis? No (P)      Do you take coumadin? No (P)      Discharge Plan A Home;Home with family (P)      Discharge Plan B Home;Home with family (P)      DME Needed Upon Discharge  none (P)      Discharge Plan discussed with: Patient (P)      Discharge Barriers Identified None (P)         Relationship/Environment    Name(s) of Who Lives With Patient Roxie Soriano (Spouse)   [phone] (Home Phone) (P)

## 2022-08-18 NOTE — PROGRESS NOTES
Pharmacist Renal Dose Adjustment Note    Kehinde Soriano is a 46 y.o. male being treated with Enoxaparin.    Patient Data:    Vital Signs (Most Recent):  Temp: 98.4 °F (36.9 °C) (08/17/22 2127)  Pulse: 106 (08/18/22 0102)  Resp: (!) 24 (08/18/22 0102)  BP: (!) 154/89 (08/18/22 0102)  SpO2: (!) 94 % (08/18/22 0102)   Vital Signs (72h Range):  Temp:  [98.4 °F (36.9 °C)]   Pulse:  []   Resp:  [10-32]   BP: (127-201)/(72-95)   SpO2:  [89 %-96 %]      Recent Labs   Lab 08/17/22 2130   CREATININE 0.7     Serum creatinine: 0.7 mg/dL 08/17/22 2130  Estimated creatinine clearance: 191.5 mL/min  Body mass index is 53.52 kg/m².      Enoxaparin 40 mg SQ once daily will be changed to Enoxaparin 40 mg SQ every 12 hours.    Pharmacist's Name: Cece Soriano  Pharmacist's Extension: [phone]

## 2022-08-18 NOTE — SUBJECTIVE & OBJECTIVE
Past Medical History:   Diagnosis Date    Diabetes mellitus, type 2     Diverticulitis of colon     Hyperlipidemia     Hypertension     Sleep apnea        Past Surgical History:   Procedure Laterality Date    ABDOMINAL SURGERY      APPENDECTOMY      COLON SURGERY      HERNIA REPAIR         Review of patient's allergies indicates:  No Known Allergies    No current facility-administered medications on file prior to encounter.     Current Outpatient Medications on File Prior to Encounter   Medication Sig    atorvastatin (LIPITOR) 20 MG tablet Take 1 tablet (20 mg total) by mouth once daily.    fluticasone (VERAMYST) 27.5 mcg/actuation nasal spray 2 sprays by Nasal route once daily.    glipiZIDE (GLUCOTROL) 5 MG tablet Take 10 mg by mouth once daily.     losartan (COZAAR) 50 MG tablet Take 50 mg by mouth once daily.    metFORMIN (GLUCOPHAGE) 500 MG tablet Take 2 tablets (1,000 mg total) by mouth 2 (two) times daily with meals.    metFORMIN (GLUCOPHAGE-XR) 750 MG ER 24hr tablet Take 1,500 mg by mouth daily with breakfast.    dexchlorphen-phenylephrine-DM (POLYTUSSIN DM) 1-5-10 mg/5 mL Syrp Take 5 mLs by mouth every 4 (four) hours as needed (postnasal drip).    diclofenac sodium (VOLTAREN) 1 % Gel Apply 2 g topically 4 (four) times daily. for 10 days    fluticasone (FLONASE) 50 mcg/actuation nasal spray 1 spray (50 mcg total) by Each Nare route once daily. (Patient not taking: Reported on 3/15/2021)    oxyCODONE-acetaminophen (PERCOCET) 5-325 mg per tablet Take 1 tablet by mouth every 4 (four) hours as needed for Pain. (Patient not taking: Reported on 3/15/2021)    predniSONE (DELTASONE) 20 MG tablet Take 1 tablet (20 mg total) by mouth 2 (two) times daily.    tamsulosin (FLOMAX) 0.4 mg Cap Take 1 capsule (0.4 mg total) by mouth once daily. for 10 days     Family History       Family history is unknown by patient.          Tobacco Use    Smoking status: Former Smoker     Packs/day: 0.50     Years: 10.00     Pack years:  5.00     Types: Cigarettes    Smokeless tobacco: Never Used   Substance and Sexual Activity    Alcohol use: No     Comment: ocassionally    Drug use: No    Sexual activity: Yes     Partners: Female     Review of Systems   Reason unable to perform ROS: Time points systems review pertinent positives and negatives are present in HPI.   Objective:     Vital Signs (Most Recent):  Temp: 98.4 °F (36.9 °C) (08/17/22 2127)  Pulse: 106 (08/18/22 0102)  Resp: (!) 24 (08/18/22 0102)  BP: (!) 154/89 (08/18/22 0102)  SpO2: (!) 94 % (08/18/22 0102)   Vital Signs (24h Range):  Temp:  [98.4 °F (36.9 °C)] 98.4 °F (36.9 °C)  Pulse:  [] 106  Resp:  [10-32] 24  SpO2:  [89 %-96 %] 94 %  BP: (127-201)/(72-95) 154/89     Weight: (!) 159.7 kg (352 lb)  Body mass index is 53.52 kg/m².    Physical Exam  Constitutional:       Appearance: Normal appearance. He is obese. He is not ill-appearing, toxic-appearing or diaphoretic.   HENT:      Head: Normocephalic and atraumatic.      Right Ear: External ear normal.      Left Ear: External ear normal.      Nose: Nose normal.   Eyes:      General: No scleral icterus.        Right eye: No discharge.         Left eye: No discharge.   Cardiovascular:      Heart sounds:     No gallop.   Pulmonary:      Effort: Pulmonary effort is normal. No respiratory distress.   Abdominal:      General: Abdomen is flat. Bowel sounds are normal. There is no distension.      Palpations: Abdomen is soft.      Tenderness: There is no abdominal tenderness. There is no guarding or rebound.   Musculoskeletal:         General: No swelling.      Cervical back: Neck supple.      Right lower leg: No edema.      Left lower leg: No edema.   Skin:     Coloration: Skin is not jaundiced or pale.      Findings: No erythema or rash.   Neurological:      General: No focal deficit present.      Mental Status: He is alert.      Comments: Awake alert oriented follows commands moves 4 extremities           Significant Labs: All  pertinent labs within the past 24 hours have been reviewed.  A1C: No results for input(s): HGBA1C in the last 4320 hours.  CBC:   Recent Labs   Lab 08/17/22 2130 08/17/22  2356   WBC 9.16  --    HGB 13.7*  --    HCT 40.4 40     --      CMP:   Recent Labs   Lab 08/17/22 2130   *   K 4.3      CO2 22*   *   BUN 9   CREATININE 0.7   CALCIUM 8.6*   PROT 7.1   ALBUMIN 4.0   BILITOT 1.0   ALKPHOS 95   AST 51*   ALT 97*   ANIONGAP 10     Troponin:   Recent Labs   Lab 08/17/22 2130   TROPONINI <0.030     TSH: No results for input(s): TSH in the last 4320 hours.  Urine Culture: No results for input(s): LABURIN in the last 48 hours.  Urine Studies: No results for input(s): COLORU, APPEARANCEUA, PHUR, SPECGRAV, PROTEINUA, GLUCUA, KETONESU, BILIRUBINUA, OCCULTUA, NITRITE, UROBILINOGEN, LEUKOCYTESUR, RBCUA, WBCUA, BACTERIA, SQUAMEPITHEL, HYALINECASTS in the last 48 hours.    Invalid input(s): WRIGHTSUR    Significant Imaging: I have reviewed all pertinent imaging results/findings within the past 24 hours.

## 2022-08-18 NOTE — ED PROVIDER NOTES
Encounter Date: 8/17/2022       History     Chief Complaint   Patient presents with    Chest Pain     46-year-old male with a history of diabetes, hypertension, hyperlipidemia, obesity, obstructive sleep apnea presents emergency department with chest pain.  The patient states that since yesterday evening he has had a pressure-like sensation located in his left chest.  The pain has been persistent throughout the day today.  He does tell me that he feels as though he cannot take a full breath but denies any flank pain with deep inspiration.  He has a nonproductive cough.  He also has increased fatigue.  He has not had fever in 2 weeks when he was diagnosed with COVID-19.  He does tell me that he had a cough with fever for several days in late in bed during that time.  He denies any leg swelling or calf pain.  He does not smoke.        Review of patient's allergies indicates:  No Known Allergies  Past Medical History:   Diagnosis Date    Diabetes mellitus, type 2     Diverticulitis of colon     Hyperlipidemia     Hypertension     Sleep apnea      Past Surgical History:   Procedure Laterality Date    ABDOMINAL SURGERY      APPENDECTOMY      COLON SURGERY      HERNIA REPAIR       Family History   Family history unknown: Yes     Social History     Tobacco Use    Smoking status: Former Smoker     Packs/day: 0.50     Years: 10.00     Pack years: 5.00     Types: Cigarettes    Smokeless tobacco: Never Used   Substance Use Topics    Alcohol use: No     Comment: ocassionally    Drug use: No     Review of Systems   Constitutional: Positive for fatigue. Negative for fever.   HENT: Negative for sore throat.    Respiratory: Positive for cough and shortness of breath.    Cardiovascular: Positive for chest pain.   Gastrointestinal: Negative for nausea.   Genitourinary: Negative for dysuria.   Musculoskeletal: Negative for back pain.   Skin: Negative for rash.   Neurological: Negative for weakness.   Hematological: Does  not bruise/bleed easily.   All other systems reviewed and are negative.      Physical Exam     Initial Vitals [08/17/22 2127]   BP Pulse Resp Temp SpO2   127/79 (!) 117 20 98.4 °F (36.9 °C) 95 %      MAP       --         Physical Exam    Nursing note and vitals reviewed.  Constitutional:   Obese, drowsy   HENT:   Head: Normocephalic and atraumatic.   Eyes: EOM are normal.   Neck:   Normal range of motion.  Cardiovascular: Regular rhythm, normal heart sounds and intact distal pulses.   No murmur heard.  Tachycardic to 110s   Pulmonary/Chest: He has no wheezes. He has no rhonchi. He has no rales.   Abdominal: Abdomen is soft. There is no abdominal tenderness.   Musculoskeletal:         General: Edema (trace pretibial) present. No tenderness. Normal range of motion.      Cervical back: Normal range of motion.     Neurological: He is alert and oriented to person, place, and time.   Skin: Skin is warm.   diaphoretic   Psychiatric: He has a normal mood and affect.         ED Course   Procedures  Labs Reviewed   CBC W/ AUTO DIFFERENTIAL - Abnormal; Notable for the following components:       Result Value    RBC 4.38 (*)     Hemoglobin 13.7 (*)     MCH 31.3 (*)     Immature Granulocytes 3.7 (*)     Immature Grans (Abs) 0.34 (*)     Lymph % 17.6 (*)     All other components within normal limits   COMPREHENSIVE METABOLIC PANEL - Abnormal; Notable for the following components:    Sodium 135 (*)     CO2 22 (*)     Glucose 248 (*)     Calcium 8.6 (*)     AST 51 (*)     ALT 97 (*)     All other components within normal limits   SARS-COV-2 RNA AMPLIFICATION, QUAL - Abnormal; Notable for the following components:    SARS-CoV-2 RNA, Amplification, Qual Positive (*)     All other components within normal limits   URINALYSIS, REFLEX TO URINE CULTURE - Abnormal; Notable for the following components:    Glucose, UA 4+ (*)     Ketones, UA Trace (*)     All other components within normal limits    Narrative:     Specimen Source->Urine    POCT GLUCOSE - Abnormal; Notable for the following components:    POC Glucose 249 (*)     All other components within normal limits   ISTAT PROCEDURE - Abnormal; Notable for the following components:    POC PO2 77 (*)     POC HCO3 23.6 (*)     POC Glucose 273 (*)     All other components within normal limits   B-TYPE NATRIURETIC PEPTIDE   TROPONIN I   PROTIME-INR   D DIMER, QUANTITATIVE   URINALYSIS MICROSCOPIC    Narrative:     Specimen Source->Urine   BASIC METABOLIC PANEL   CBC W/ AUTO DIFFERENTIAL   VITAMIN D   MAGNESIUM   PHOSPHORUS   TROPONIN I   HEMOGLOBIN A1C   POCT GLUCOSE MONITORING CONTINUOUS   POCT GLUCOSE MONITORING CONTINUOUS        ECG Results          EKG 12-lead (In process)  Result time 08/18/22 05:08:23    In process by Interface, Lab In [facility] (08/18/22 05:08:23)                 Narrative:    Test Reason : R07.9,    Vent. Rate : 120 BPM     Atrial Rate : 120 BPM     P-R Int : 162 ms          QRS Dur : 082 ms      QT Int : 318 ms       P-R-T Axes : 042 -30 059 degrees     QTc Int : 449 ms    Sinus tachycardia  Left axis deviation  Minimal voltage criteria for LVH, may be normal variant  Abnormal ECG  When compared with ECG of 08-JUL-2020 22:50,  No significant change was found    Referred By: [email or web address]   SELF           Confirmed By:                             Imaging Results          CTA Chest Non-Coronary (PE Study) (Final result)  Result time 08/17/22 23:24:20    Final result by Elida Jimenez MD (08/17/22 23:24:20)                 Narrative:    CT CHEST ANGIOGRAPHY WITH IV CONTRAST    INDICATION: 46 years Male; Pulmonary embolism (PE) suspected, high prob    TECHNIQUE:  CT angiogram of the chest was performed with IV contrast.  3-D/MIP reconstructions were performed.  This exam was performed according to our departmental dose-optimization program, which includes automated exposure control, adjustment of the mA and/or kV according to patient size and/or use of iterative reconstruction  technique.    COMPARISON: None.    FINDINGS:  Thyroid: Unremarkable.  Heart/Mediastinum: The heart is mildly enlarged.  Vasculature: No evidence of pulmonary emboli.  No aortic aneurysm or dissection.  Lungs/Pleura: 6 mm nodule in the right upper lobe (axial image 102). Mild groundglass in the dependent portions of both lungs probably represents atelectasis however differential diagnosis includes edema. No pleural effusion or pneumothorax.  Lymph nodes: A few enlarged lymph nodes are located in the mediastinum with the largest in the upper right paratracheal region measuring 1.6 x 1.4 cm.  Bones: Unremarkable.  Soft tissues: Unremarkable.  Incidental findings: Hepatic steatosis.    IMPRESSION:  1.  No evidence of pulmonary emboli.  2.  Mild cardiomegaly.  3.  Mild groundglass in the dependent portions of both lungs probably represents atelectasis however differential diagnosis includes edema.  4.  6 mm nodule in the right upper lobe.  Recommend a non-contrast Chest CT at 6-12 months. If patient is high risk for malignancy, recommend an additional non-contrast Chest CT at 18-24 months; if patient is low risk for malignancy a non-contrast Chest CT at 18-24 months is optional.  5.  Mildly enlarged lymph nodes in the right upper paratracheal region are nonspecific.    Electronically signed by:  Elida Jimenez  8/17/2022 11:24 PM CDT Workstation: [id number]                             X-Ray Chest AP Portable (Final result)  Result time 08/17/22 23:27:57    Final result by Elida Jimenez MD (08/17/22 23:27:57)                 Narrative:    XR CHEST 1 VIEW    INDICATION: 46 years Male; chest pain    TECHNIQUE: 1 view of the chest was performed.    Comparison: 5/27/2019.    FINDINGS:  Lungs/Pleura: No acute airspace opacities, pleural effusion, or pneumothorax.  Heart/Mediastinum: The heart is borderline enlarged.  Bones: Unremarkable.  Soft tissues: Unremarkable.  Lines/Tubes: None.  Incidental findings:  None.    IMPRESSION:    Borderline cardiomegaly.  No acute pulmonary findings.    Electronically signed by:  Elida Jimenez  8/17/2022 11:27 PM CDT Workstation: [id number]                            X-Rays:   Independently Interpreted Readings:   Chest X-Ray: Cardiomegaly present.     Medications   sodium chloride 0.9% flush 2 mL (has no administration in time range)   potassium bicarbonate disintegrating tablet 50 mEq (has no administration in time range)   potassium bicarbonate disintegrating tablet 35 mEq (has no administration in time range)   potassium bicarbonate disintegrating tablet 60 mEq (has no administration in time range)   magnesium oxide tablet 800 mg (has no administration in time range)   magnesium oxide tablet 800 mg (has no administration in time range)   potassium, sodium phosphates 280-160-250 mg packet 2 packet (has no administration in time range)   potassium, sodium phosphates 280-160-250 mg packet 2 packet (has no administration in time range)   potassium, sodium phosphates 280-160-250 mg packet 2 packet (has no administration in time range)   acetaminophen tablet 650 mg (has no administration in time range)   famotidine tablet 20 mg (has no administration in time range)   ondansetron injection 4 mg (has no administration in time range)   remdesivir 200 mg in sodium chloride 0.9% 250 mL infusion (has no administration in time range)     Followed by   remdesivir 100 mg in sodium chloride 0.9% 250 mL infusion (has no administration in time range)   dexamethasone injection 6 mg (has no administration in time range)   cholecalciferol (vitamin D3) tablet 2,000 Units (has no administration in time range)   glucose chewable tablet 16 g (has no administration in time range)   glucose chewable tablet 24 g (has no administration in time range)   glucagon (human recombinant) injection 1 mg (has no administration in time range)   dextrose 10% bolus 125 mL (has no administration in time range)   dextrose 10%  bolus 250 mL (has no administration in time range)   insulin aspart U-100 pen 0-5 Units (has no administration in time range)   nitroGLYCERIN SL tablet 0.4 mg (0.4 mg Sublingual Given 8/18/22 0133)   aspirin chewable tablet 81 mg (has no administration in time range)   atorvastatin tablet 80 mg (has no administration in time range)   enoxaparin injection 40 mg (has no administration in time range)   iohexoL (OMNIPAQUE 350) injection 100 mL (100 mLs Intravenous Given 8/17/22 2226)   aspirin tablet 325 mg (325 mg Oral Given 8/18/22 0119)   dexamethasone injection 6 mg (6 mg Intravenous Given 8/18/22 0119)     Medical Decision Making:   ED Management:  46-year-old male presents emergency department with chest pain and shortness of breath.  Found to be hypoxic and tachycardic with any sort of exertion.  Improvement in vitals with 2 L nasal cannula.  Initial workup unremarkable.  Will give Decadron and admit for further management of COVID-19 with acute hypoxic respiratory failure.    Keisha Adams MD  Emergency Medicine  08/18/2022 5:21 AM      Additional MDM:   Heart Score:    History:          Moderately suspicious.  ECG:             Normal  Age:               45-65 years  Risk factors: >= 3 risk factors or history of atherosclerotic disease  Troponin:       Less than or equal to normal limit  Final Score: 4                       Clinical Impression:   Final diagnoses:  [R07.9] Chest pain  [J96.01] Acute respiratory failure with hypoxia (Primary)  [U07.1] COVID-19          ED Disposition Condition    Admit               Keisha Adams MD  08/18/22 2589

## 2022-08-18 NOTE — CARE UPDATE
08/18/22 1438   Home Oxygen Qualification   $ Home O2 Qualification Pulmonary Stress Test/6 min walk;Tech time 15 minutes   Room Air SpO2 At Rest 95 %   Room Air SpO2 During Ambulation 94 %   Home O2 Eval Comments pt doesn't require home O2

## 2022-08-18 NOTE — HPI
46 years old male past medical history of diabetes on metformin glipizide home hypertension dyslipidemia BMI of 53 obstructive sleep apnea on CPAP at home he was diagnosed with COVID-19 according to him 2 weeks ago and was taking over-the-counter medications for it he states that for the past day he is having chest pressure retrosternal with no radiation and he has been having shortness of breath since he had COVID that it is triggered by walking he states that whenever he takes deep breath he has chest discomfort he denies any actual episodes of loss of consciousness when he arrived to the ER was noticed the patient was having episodes of hypoxia so he was placed on his CPAP he received 325 mg of aspirin and dexamethasone IV 6 mg going to be admitted. CTPE negative.

## 2022-08-18 NOTE — H&P
[facility] Emergency Dept  Hospital Medicine  History & Physical    Patient Name: Kehinde Soriano  MRN: [id number]  Patient Class: IP- Inpatient  Admission Date: 8/17/2022  Attending Physician: Keisha Adams MD   Primary Care Provider: Davy Quevedo MD         Patient information was obtained from patient, past medical records and ER records.     Subjective:     Principal Problem:Hypoxia    Chief Complaint:   Chief Complaint   Patient presents with    Chest Pain        HPI:   46 years old male past medical history of diabetes on metformin glipizide home hypertension dyslipidemia BMI of 53 obstructive sleep apnea on CPAP at home he was diagnosed with COVID-19 according to him 2 weeks ago and was taking over-the-counter medications for it he states that for the past day he is having chest pressure retrosternal with no radiation and he has been having shortness of breath since he had COVID that it is triggered by walking he states that whenever he takes deep breath he has chest discomfort he denies any actual episodes of loss of consciousness when he arrived to the ER was noticed the patient was having episodes of hypoxia so he was placed on his CPAP he received 325 mg of aspirin and dexamethasone IV 6 mg going to be admitted. CTPE negative.      Past Medical History:   Diagnosis Date    Diabetes mellitus, type 2     Diverticulitis of colon     Hyperlipidemia     Hypertension     Sleep apnea        Past Surgical History:   Procedure Laterality Date    ABDOMINAL SURGERY      APPENDECTOMY      COLON SURGERY      HERNIA REPAIR         Review of patient's allergies indicates:  No Known Allergies    No current facility-administered medications on file prior to encounter.     Current Outpatient Medications on File Prior to Encounter   Medication Sig    atorvastatin (LIPITOR) 20 MG tablet Take 1 tablet (20 mg total) by mouth once daily.    fluticasone (VERAMYST) 27.5 mcg/actuation nasal  spray 2 sprays by Nasal route once daily.    glipiZIDE (GLUCOTROL) 5 MG tablet Take 10 mg by mouth once daily.     losartan (COZAAR) 50 MG tablet Take 50 mg by mouth once daily.    metFORMIN (GLUCOPHAGE) 500 MG tablet Take 2 tablets (1,000 mg total) by mouth 2 (two) times daily with meals.    metFORMIN (GLUCOPHAGE-XR) 750 MG ER 24hr tablet Take 1,500 mg by mouth daily with breakfast.    dexchlorphen-phenylephrine-DM (POLYTUSSIN DM) 1-5-10 mg/5 mL Syrp Take 5 mLs by mouth every 4 (four) hours as needed (postnasal drip).    diclofenac sodium (VOLTAREN) 1 % Gel Apply 2 g topically 4 (four) times daily. for 10 days    fluticasone (FLONASE) 50 mcg/actuation nasal spray 1 spray (50 mcg total) by Each Nare route once daily. (Patient not taking: Reported on 3/15/2021)    oxyCODONE-acetaminophen (PERCOCET) 5-325 mg per tablet Take 1 tablet by mouth every 4 (four) hours as needed for Pain. (Patient not taking: Reported on 3/15/2021)    predniSONE (DELTASONE) 20 MG tablet Take 1 tablet (20 mg total) by mouth 2 (two) times daily.    tamsulosin (FLOMAX) 0.4 mg Cap Take 1 capsule (0.4 mg total) by mouth once daily. for 10 days     Family History       Family history is unknown by patient.          Tobacco Use    Smoking status: Former Smoker     Packs/day: 0.50     Years: 10.00     Pack years: 5.00     Types: Cigarettes    Smokeless tobacco: Never Used   Substance and Sexual Activity    Alcohol use: No     Comment: ocassionally    Drug use: No    Sexual activity: Yes     Partners: Female     Review of Systems   Reason unable to perform ROS: Time points systems review pertinent positives and negatives are present in HPI.   Objective:     Vital Signs (Most Recent):  Temp: 98.4 °F (36.9 °C) (08/17/22 2127)  Pulse: 106 (08/18/22 0102)  Resp: (!) 24 (08/18/22 0102)  BP: (!) 154/89 (08/18/22 0102)  SpO2: (!) 94 % (08/18/22 0102)   Vital Signs (24h Range):  Temp:  [98.4 °F (36.9 °C)] 98.4 °F (36.9 °C)  Pulse:  []  106  Resp:  [10-32] 24  SpO2:  [89 %-96 %] 94 %  BP: (127-201)/(72-95) 154/89     Weight: (!) 159.7 kg (352 lb)  Body mass index is 53.52 kg/m².    Physical Exam  Constitutional:       Appearance: Normal appearance. He is obese. He is not ill-appearing, toxic-appearing or diaphoretic.   HENT:      Head: Normocephalic and atraumatic.      Right Ear: External ear normal.      Left Ear: External ear normal.      Nose: Nose normal.   Eyes:      General: No scleral icterus.        Right eye: No discharge.         Left eye: No discharge.   Cardiovascular:      Heart sounds:     No gallop.   Pulmonary:      Effort: Pulmonary effort is normal. No respiratory distress.   Abdominal:      General: Abdomen is flat. Bowel sounds are normal. There is no distension.      Palpations: Abdomen is soft.      Tenderness: There is no abdominal tenderness. There is no guarding or rebound.   Musculoskeletal:         General: No swelling.      Cervical back: Neck supple.      Right lower leg: No edema.      Left lower leg: No edema.   Skin:     Coloration: Skin is not jaundiced or pale.      Findings: No erythema or rash.   Neurological:      General: No focal deficit present.      Mental Status: He is alert.      Comments: Awake alert oriented follows commands moves 4 extremities           Significant Labs: All pertinent labs within the past 24 hours have been reviewed.  A1C: No results for input(s): HGBA1C in the last 4320 hours.  CBC:   Recent Labs   Lab 08/17/22 2130 08/17/22  2356   WBC 9.16  --    HGB 13.7*  --    HCT 40.4 40     --      CMP:   Recent Labs   Lab 08/17/22 2130   *   K 4.3      CO2 22*   *   BUN 9   CREATININE 0.7   CALCIUM 8.6*   PROT 7.1   ALBUMIN 4.0   BILITOT 1.0   ALKPHOS 95   AST 51*   ALT 97*   ANIONGAP 10     Troponin:   Recent Labs   Lab 08/17/22 2130   TROPONINI <0.030     TSH: No results for input(s): TSH in the last 4320 hours.  Urine Culture: No results for input(s): LABURIN in  the last 48 hours.  Urine Studies: No results for input(s): COLORU, APPEARANCEUA, PHUR, SPECGRAV, PROTEINUA, GLUCUA, KETONESU, BILIRUBINUA, OCCULTUA, NITRITE, UROBILINOGEN, LEUKOCYTESUR, RBCUA, WBCUA, BACTERIA, SQUAMEPITHEL, HYALINECASTS in the last 48 hours.    Invalid input(s): WRIGHTSUR    Significant Imaging: I have reviewed all pertinent imaging results/findings within the past 24 hours.    Assessment/Plan:     * Hypoxia    Likely due to COVID-19  Dexamethasone IV 6 mg a day  Remdesevir  Vitamin-D supplement and level          Hypertension    Continue present management    Diabetes  Insulin sliding scale    JANKI (obstructive sleep apnea)    Keep CPAP    Chest pressure    Aspirin 81 mg a day  Atorvastatin 80 mg a day  Repeat troponin 6 hours        VTE Risk Mitigation (From admission, onward)         Ordered     enoxaparin injection 40 mg  Every 12 hours         08/18/22 0133     IP VTE HIGH RISK PATIENT  Once         08/18/22 0119     Place sequential compression device  Until discontinued         08/18/22 0119                   Aditya Figueroa MD  Department of Hospital Medicine   [facility] - Emergency Dept

## 2022-08-18 NOTE — DISCHARGE SUMMARY
"[facility]  Discharge Summary  Patient Name: Kehinde Soriano MRN: [id number]   Patient Class: IP- Inpatient  Length of Stay: 0   Admission Date: 8/17/2022  9:18 PM Attending Physician: Whitney Haas MD   Primary Care Provider: Davy Quevedo MD Face-to-Face encounter date: 08/18/2022   Chief Complaint: Chest Pain    Date of Discharge: 8/18/2022  Discharge Disposition:Home or Self Care    Condition: Stable       Reason for Hospitalization   COVID 19 infection  hypoxia    Patient Active Problem List   Diagnosis    Foot sprain, left, initial encounter    Hypoxia    Chest pressure    JANKI (obstructive sleep apnea)    Diabetes    Hypertension       Brief History of Present Illness    Kehinde Soriano is a 46 y.o.  male who  has a past medical history of Diabetes mellitus, type 2, Diverticulitis of colon, Hyperlipidemia, Hypertension, and Sleep apnea.. The patient presented to [facility] on 8/17/2022 with a primary complaint of Chest Pain  .     For the full HPI please refer to the History & Physical from this admission.    Hospital Course By Problem with Pertinent Findings     Admitted for covid 19 and hypoxia. He was monitored overnight. O2 evaluation done next morning. He did not qualify for oxygen.    Patient was seen and examined on the date of discharge and determined to be suitable for discharge.    Physical Exam  /68 (BP Location: Right arm, Patient Position: Lying)   Pulse 63   Temp 98.4 °F (36.9 °C) (Oral)   Resp 19   Ht 5' 8" (1.727 m)   Wt (!) 159.7 kg (352 lb)   SpO2 96%   BMI 53.52 kg/m²   Vitals reviewed.    Constitutional: No distress.   HENT: Atraumatic.   Cardiovascular: Normal rate, regular rhythm and normal heart sounds.   Pulmonary/Chest: Effort normal. Clear to auscultation bilaterally. No wheezes.   Abdominal: Soft. Bowel sounds are normal. Exhibits no distension and no mass. No tenderness  Neurological: Alert.   Skin: Skin is warm and dry. "     Following labs were Reviewed   Recent Labs   Lab 08/17/22  2130 08/17/22  2356 08/18/22  0629   WBC 9.16  --  8.46   HGB 13.7*  --  13.6*   HCT 40.4   < > 41.3     --  233   CALCIUM 8.6*  --  8.4*   ALBUMIN 4.0  --   --    PROT 7.1  --   --    *  --  139   K 4.3  --  4.2   CO2 22*  --  21*     --  101   BUN 9  --  10   CREATININE 0.7  --  0.7   ALKPHOS 95  --   --    ALT 97*  --   --    AST 51*  --   --    BILITOT 1.0  --   --     < > = values in this interval not displayed.     No results found for: POCTGLUCOSE     All labs within the past 24 hours have been reviewed    Microbiology Results (last 7 days)     ** No results found for the last 168 hours. **        CTA Chest Non-Coronary (PE Study)   Final Result      X-Ray Chest AP Portable   Final Result          No results found for this or any previous visit.      Consultants and Procedures   Consultants:  Consults (From admission, onward)        Status Ordering Provider     Inpatient consult to Hospitalist  Once        Provider:  MD Subhash Hutchison ELIZABETH D.          Procedures:   none    Discharge Information:   Diet:  Resume regular diet    Physical Activity:  Activity as tolerated    Instructions:  1. Take all medications as prescribed  2. Keep all follow-up appointments  3. Return to the hospital or call your primary care physicians if any worsening symptoms such as chest pain, shortness of breath occur.    Follow-Up Appointments:  1. Please call your primary care physician to schedule an appointment in 1 week time.       Follow-up Information     Davy Quevedo MD Follow up in 1 week(s).    Specialty: Internal Medicine  Contact information:  [address] [facility]  [address]  [place] [id number]                           Pending laboratory work/Tests to be performed/followed by the Primary care Physician: none    The patient was discharged in the care of her parents//wife/family/caregiver, with discharge  instructions were reviewed in written and verbal form. All pertinent questions were discussed and prescriptions were provided. The importance of making follow up appointments and compliance of medications has been stressed repeatedly. The patient will follow up in 1 week or sooner as needed with the PCP, and the patient is on board with the plan. Upon discharge, patient needs to be on following medications.    Discharge Medications:     Medication List      CHANGE how you take these medications    metFORMIN 500 MG tablet  Commonly known as: GLUCOPHAGE  Take 2 tablets (1,000 mg total) by mouth 2 (two) times daily with meals.  What changed: Another medication with the same name was removed. Continue taking this medication, and follow the directions you see here.        CONTINUE taking these medications    atorvastatin 20 MG tablet  Commonly known as: LIPITOR  Take 1 tablet (20 mg total) by mouth once daily.     diclofenac sodium 1 % Gel  Commonly known as: VOLTAREN  Apply 2 g topically 4 (four) times daily. for 10 days     fluticasone 27.5 mcg/actuation nasal spray  Commonly known as: VERAMYST  2 sprays by Nasal route once daily.     fluticasone propionate 50 mcg/actuation nasal spray  Commonly known as: FLONASE  1 spray (50 mcg total) by Each Nare route once daily.     glipiZIDE 5 MG tablet  Commonly known as: GLUCOTROL     losartan 50 MG tablet  Commonly known as: COZAAR     oxyCODONE-acetaminophen 5-325 mg per tablet  Commonly known as: PERCOCET  Take 1 tablet by mouth every 4 (four) hours as needed for Pain.     POLYTUSSIN DM 1-5-10 mg/5 mL Syrp  Generic drug: dexchlorphen-phenylephrine-DM  Take 5 mLs by mouth every 4 (four) hours as needed (postnasal drip).     tamsulosin 0.4 mg Cap  Commonly known as: FLOMAX  Take 1 capsule (0.4 mg total) by mouth once daily. for 10 days        STOP taking these medications    predniSONE 20 MG tablet  Commonly known as: DELTASONE              I spent 30 minutes preparing the  discharge including reviewing records from previous encounters, preparation of discharge summary, assessing and final examination of the patient, discharge medicine reconciliation, discussing plan of care, follow up and education and prescriptions.       Whitney Haas  [facility] Hospitalist  08/18/2022

## 2022-08-18 NOTE — DISCHARGE INSTRUCTIONS
Diet:  Resume regular diet    Physical Activity:  Activity as tolerated    Instructions:  1. Take all medications as prescribed  2. Keep all follow-up appointments  3. Return to the hospital or call your primary care physicians if any worsening symptoms such as chest pain, shortness of breath occur.    Follow-Up Appointments:  Please call your primary care physician to schedule an appointment in 1 week time.

## 2022-08-18 NOTE — ASSESSMENT & PLAN NOTE
Likely due to COVID-19  Dexamethasone IV 6 mg a day  Remdesevir  Vitamin-D supplement and level

## 2022-08-19 NOTE — PLAN OF CARE
Chart and discharge orders reviewed.  Patient discharged home with no further case management needs.     08/19/22 9776   Final Note   Assessment Type Final Discharge Note   Anticipated Discharge Disposition Home   Post-Acute Status   Discharge Delays None known at this time

## 2022-09-06 ENCOUNTER — OFFICE VISIT (OUTPATIENT)
Dept: FAMILY MEDICINE | Facility: CLINIC | Age: 47
End: 2022-09-06
Payer: MEDICAID

## 2022-09-06 VITALS
SYSTOLIC BLOOD PRESSURE: 138 MMHG | DIASTOLIC BLOOD PRESSURE: 82 MMHG | RESPIRATION RATE: 16 BRPM | TEMPERATURE: 98 F | WEIGHT: 315 LBS | HEART RATE: 99 BPM | OXYGEN SATURATION: 95 % | HEIGHT: 68 IN | BODY MASS INDEX: 47.74 KG/M2

## 2022-09-06 DIAGNOSIS — E11.9 ENCOUNTER FOR DIABETIC FOOT EXAM: ICD-10-CM

## 2022-09-06 DIAGNOSIS — Z13.220 SCREENING CHOLESTEROL LEVEL: ICD-10-CM

## 2022-09-06 DIAGNOSIS — Z11.59 ENCOUNTER FOR HEPATITIS C SCREENING TEST FOR LOW RISK PATIENT: ICD-10-CM

## 2022-09-06 DIAGNOSIS — Z12.11 COLON CANCER SCREENING: ICD-10-CM

## 2022-09-06 DIAGNOSIS — Z00.00 ROUTINE HEALTH MAINTENANCE: ICD-10-CM

## 2022-09-06 DIAGNOSIS — E11.9 TYPE 2 DIABETES MELLITUS WITHOUT COMPLICATION, WITHOUT LONG-TERM CURRENT USE OF INSULIN: Primary | ICD-10-CM

## 2022-09-06 DIAGNOSIS — Z11.4 SCREENING FOR HIV WITHOUT PRESENCE OF RISK FACTORS: ICD-10-CM

## 2022-09-06 PROCEDURE — 1111F DSCHRG MED/CURRENT MED MERGE: CPT | Mod: CPTII,,, | Performed by: NURSE PRACTITIONER

## 2022-09-06 PROCEDURE — 99204 PR OFFICE/OUTPT VISIT, NEW, LEVL IV, 45-59 MIN: ICD-10-PCS | Mod: S$PBB,,, | Performed by: NURSE PRACTITIONER

## 2022-09-06 PROCEDURE — 3046F PR MOST RECENT HEMOGLOBIN A1C LEVEL > 9.0%: ICD-10-PCS | Mod: CPTII,,, | Performed by: NURSE PRACTITIONER

## 2022-09-06 PROCEDURE — 4010F PR ACE/ARB THEARPY RXD/TAKEN: ICD-10-PCS | Mod: CPTII,,, | Performed by: NURSE PRACTITIONER

## 2022-09-06 PROCEDURE — 3079F DIAST BP 80-89 MM HG: CPT | Mod: CPTII,,, | Performed by: NURSE PRACTITIONER

## 2022-09-06 PROCEDURE — 1159F PR MEDICATION LIST DOCUMENTED IN MEDICAL RECORD: ICD-10-PCS | Mod: CPTII,,, | Performed by: NURSE PRACTITIONER

## 2022-09-06 PROCEDURE — 1111F PR DISCHARGE MEDS RECONCILED W/ CURRENT OUTPATIENT MED LIST: ICD-10-PCS | Mod: CPTII,,, | Performed by: NURSE PRACTITIONER

## 2022-09-06 PROCEDURE — 1159F MED LIST DOCD IN RCRD: CPT | Mod: CPTII,,, | Performed by: NURSE PRACTITIONER

## 2022-09-06 PROCEDURE — 99204 OFFICE O/P NEW MOD 45 MIN: CPT | Mod: S$PBB,,, | Performed by: NURSE PRACTITIONER

## 2022-09-06 PROCEDURE — 4010F ACE/ARB THERAPY RXD/TAKEN: CPT | Mod: CPTII,,, | Performed by: NURSE PRACTITIONER

## 2022-09-06 PROCEDURE — 99215 OFFICE O/P EST HI 40 MIN: CPT | Performed by: NURSE PRACTITIONER

## 2022-09-06 PROCEDURE — 3008F BODY MASS INDEX DOCD: CPT | Mod: CPTII,,, | Performed by: NURSE PRACTITIONER

## 2022-09-06 PROCEDURE — 3008F PR BODY MASS INDEX (BMI) DOCUMENTED: ICD-10-PCS | Mod: CPTII,,, | Performed by: NURSE PRACTITIONER

## 2022-09-06 PROCEDURE — 3075F PR MOST RECENT SYSTOLIC BLOOD PRESS GE 130-139MM HG: ICD-10-PCS | Mod: CPTII,,, | Performed by: NURSE PRACTITIONER

## 2022-09-06 PROCEDURE — 3075F SYST BP GE 130 - 139MM HG: CPT | Mod: CPTII,,, | Performed by: NURSE PRACTITIONER

## 2022-09-06 PROCEDURE — 3079F PR MOST RECENT DIASTOLIC BLOOD PRESSURE 80-89 MM HG: ICD-10-PCS | Mod: CPTII,,, | Performed by: NURSE PRACTITIONER

## 2022-09-06 PROCEDURE — 3046F HEMOGLOBIN A1C LEVEL >9.0%: CPT | Mod: CPTII,,, | Performed by: NURSE PRACTITIONER

## 2022-09-06 RX ORDER — BLOOD-GLUCOSE METER
EACH MISCELLANEOUS 2 TIMES DAILY PRN
COMMUNITY
Start: 2022-08-30

## 2022-09-06 RX ORDER — METFORMIN HYDROCHLORIDE 750 MG/1
1500 TABLET, EXTENDED RELEASE ORAL
Qty: 60 TABLET | Refills: 2 | Status: SHIPPED | OUTPATIENT
Start: 2022-09-06 | End: 2022-12-07 | Stop reason: SDUPTHER

## 2022-09-06 RX ORDER — LANCETS 33 GAUGE
EACH MISCELLANEOUS 2 TIMES DAILY PRN
COMMUNITY
Start: 2022-04-04

## 2022-09-06 RX ORDER — LANCETS 33 GAUGE
EACH MISCELLANEOUS
COMMUNITY

## 2022-09-06 RX ORDER — ASPIRIN 325 MG
TABLET, DELAYED RELEASE (ENTERIC COATED) ORAL
COMMUNITY
End: 2023-09-21 | Stop reason: ALTCHOICE

## 2022-09-06 RX ORDER — GLIPIZIDE 10 MG/1
10 TABLET, FILM COATED, EXTENDED RELEASE ORAL
Qty: 30 TABLET | Refills: 2 | Status: SHIPPED | OUTPATIENT
Start: 2022-09-06 | End: 2022-12-07 | Stop reason: SDUPTHER

## 2022-09-06 NOTE — PROGRESS NOTES
Patient ID: Kehined Soriano is a 46 y.o. male.    Chief Complaint: Establish Care and Diabetes    Mr. Soriano presents today to establish care with me as his PCP. He was previously seen by Dr. Quevedo in [place]. He has a PMH of Diabetes that he is currently taking medication for. He is not at goal currently but states he takes his medication as prescribed. He is not strict with his diet. He also has a history of Hyperlipidemia and Hypertension which are both well controlled with current medication regimen. He states he is doing well overall at the time of this visit and he denies any major issues or complaints.     Diabetes  He presents for his initial diabetic visit. He has type 2 diabetes mellitus. No MedicAlert identification noted. The initial diagnosis of diabetes was made 7 Years ago. Pertinent negatives for hypoglycemia include no confusion, dizziness, headaches, hunger, mood changes, nervousness/anxiousness, pallor, seizures, sleepiness, speech difficulty, sweats or tremors. Associated symptoms include fatigue, polydipsia, polyphagia and polyuria. Pertinent negatives for diabetes include no blurred vision, no chest pain, no foot paresthesias, no foot ulcerations, no visual change, no weakness and no weight loss. Pertinent negatives for hypoglycemia complications include no blackouts, no hospitalization, no nocturnal hypoglycemia, no required assistance and no required glucagon injection. Symptoms are stable. Pertinent negatives for diabetic complications include no autonomic neuropathy, CVA, heart disease, impotence, nephropathy, peripheral neuropathy, PVD or retinopathy. Risk factors for coronary artery disease include hypertension, obesity, diabetes mellitus and male sex. Current diabetic treatment includes oral agent (triple therapy). He is compliant with treatment most of the time. His weight is fluctuating minimally. He is following a generally healthy diet. Meal planning includes avoidance of  concentrated sweets and carbohydrate counting. He has not had a previous visit with a dietitian. He never participates in exercise. He monitors blood glucose at home 3-4 x per week. Blood glucose monitoring compliance is good. He does not see a podiatrist.Eye exam is current.   Hyperlipidemia  This is a chronic problem. The current episode started more than 1 year ago. The problem is controlled. Recent lipid tests were reviewed and are normal. Exacerbating diseases include diabetes and obesity. Factors aggravating his hyperlipidemia include fatty foods. Pertinent negatives include no chest pain, myalgias or shortness of breath. Current antihyperlipidemic treatment includes statins. The current treatment provides moderate improvement of lipids. Compliance problems include adherence to diet and adherence to exercise.  Risk factors for coronary artery disease include diabetes mellitus, dyslipidemia, family history, hypertension, male sex, obesity, a sedentary lifestyle and stress.   Hypertension  This is a chronic problem. The current episode started more than 1 year ago. The problem has been gradually improving since onset. The problem is controlled. Pertinent negatives include no blurred vision, chest pain, headaches, shortness of breath or sweats. There are no associated agents to hypertension. Risk factors for coronary artery disease include diabetes mellitus, dyslipidemia, family history, male gender, obesity, sedentary lifestyle and stress. Past treatments include angiotensin blockers. The current treatment provides significant improvement. Compliance problems include diet and exercise.  There is no history of CVA, PVD or retinopathy.     We reviewed [facility] health maintenance.     Past Medical History:   Diagnosis Date    Diabetes mellitus, type 2     Diverticulitis of colon     Hyperlipidemia     Hypertension     Sleep apnea      Past Surgical History:   Procedure Laterality Date    ABDOMINAL SURGERY       APPENDECTOMY      COLON SURGERY      HERNIA REPAIR           Tobacco History:  reports that he has quit smoking. His smoking use included cigarettes. He has a 5.00 pack-year smoking history. He has been exposed to tobacco smoke. He has never used smokeless tobacco.      Review of patient's allergies indicates:  No Known Allergies    Current Outpatient Medications:     atorvastatin (LIPITOR) 20 MG tablet, Take 1 tablet (20 mg total) by mouth once daily., Disp: 30 tablet, Rfl: 0    blood sugar diagnostic Strp, OneTouch Verio test strips  TEST BLOOD SUGAR BID AND PRN, Disp: , Rfl:     cholecalciferol, vitamin D3, 1,250 mcg (50,000 unit) capsule, cholecalciferol (vitamin D3) 1,250 mcg (50,000 unit) capsule  TAKE ONE CAPSULE BY MOUTH EVERY WEEK, Disp: , Rfl:     empagliflozin (JARDIANCE) 25 mg tablet, Take 1 tablet (25 mg total) by mouth once daily., Disp: 30 tablet, Rfl: 2    glipiZIDE (GLUCOTROL) 10 MG TR24, Take 1 tablet (10 mg total) by mouth daily with breakfast., Disp: 30 tablet, Rfl: 2    lancets 33 gauge Misc, OneTouch Delica Plus Lancet 33 gauge  USE TWICE DAILY AS DIRECTED AND AS NEEDED FOR BLOOD GLUCOSE TESTING, Disp: , Rfl:     losartan (COZAAR) 50 MG tablet, Take 50 mg by mouth once daily., Disp: , Rfl:     metFORMIN (GLUCOPHAGE-XR) 750 MG ER 24hr tablet, Take 2 tablets (1,500 mg total) by mouth daily with breakfast., Disp: 60 tablet, Rfl: 2    ONETOUCH DELICA PLUS LANCET 33 gauge Misc, Apply topically 2 (two) times daily as needed., Disp: , Rfl:     ONETOUCH VERIO TEST STRIPS Strp, 2 (two) times daily as needed., Disp: , Rfl:     Review of Systems   Constitutional:  Positive for fatigue. Negative for activity change, appetite change, fever and weight loss.   HENT:  Negative for congestion, dental problem, nosebleeds, postnasal drip, rhinorrhea, sinus pain, sore throat and tinnitus.    Eyes:  Negative for blurred vision, pain, discharge, itching and visual disturbance.   Respiratory:   "Negative for cough, chest tightness, shortness of breath and wheezing.    Cardiovascular:  Negative for chest pain.   Gastrointestinal:  Negative for abdominal pain, blood in stool, constipation, diarrhea, nausea and vomiting.   Endocrine: Positive for polydipsia, polyphagia and polyuria. Negative for cold intolerance and heat intolerance.   Genitourinary:  Negative for difficulty urinating, flank pain, genital sores, hematuria, impotence and urgency.   Musculoskeletal:  Negative for arthralgias and myalgias.   Skin:  Negative for pallor, rash and wound.   Allergic/Immunologic: Negative for environmental allergies and food allergies.   Neurological:  Negative for dizziness, tremors, seizures, speech difficulty, weakness, light-headedness and headaches.   Hematological:  Negative for adenopathy. Does not bruise/bleed easily.   Psychiatric/Behavioral:  Negative for behavioral problems, confusion, decreased concentration, sleep disturbance and suicidal ideas. The patient is not nervous/anxious and is not hyperactive.         Objective:      Vitals:    09/06/22 1021   BP: 138/82   Pulse: 99   Resp: 16   Temp: 98.3 °F (36.8 °C)   TempSrc: Oral   SpO2: 95%   Weight: (!) 161.1 kg (355 lb 3.2 oz)   Height: 5' 8" (1.727 m)     Physical Exam  Vitals reviewed.   Constitutional:       General: He is not in acute distress.     Appearance: Normal appearance. He is obese. He is not ill-appearing, toxic-appearing or diaphoretic.   HENT:      Head: Normocephalic and atraumatic.      Right Ear: Tympanic membrane and external ear normal. There is no impacted cerumen.      Left Ear: Tympanic membrane and external ear normal. There is no impacted cerumen.      Nose: Nose normal. No congestion or rhinorrhea.      Mouth/Throat:      Mouth: Mucous membranes are moist.      Pharynx: Oropharynx is clear. No oropharyngeal exudate or posterior oropharyngeal erythema.   Eyes:      General: No scleral icterus.        Right eye: No discharge.    "      Left eye: No discharge.      Extraocular Movements: Extraocular movements intact.      Conjunctiva/sclera: Conjunctivae normal.      Pupils: Pupils are equal, round, and reactive to light.   Cardiovascular:      Rate and Rhythm: Normal rate and regular rhythm.      Pulses: Normal pulses.      Heart sounds: Normal heart sounds. No murmur heard.    No friction rub. No gallop.   Pulmonary:      Effort: Pulmonary effort is normal. No respiratory distress.      Breath sounds: Normal breath sounds. No wheezing, rhonchi or rales.   Chest:      Chest wall: No tenderness.   Abdominal:      General: Abdomen is protuberant. Bowel sounds are normal.      Palpations: Abdomen is soft. There is no mass.      Tenderness: There is no abdominal tenderness. There is no guarding or rebound.   Musculoskeletal:         General: No swelling or signs of injury. Normal range of motion.      Cervical back: Normal range of motion and neck supple. No rigidity or tenderness.      Right lower leg: No edema.      Left lower leg: No edema.   Skin:     General: Skin is warm and dry.      Capillary Refill: Capillary refill takes less than 2 seconds.      Coloration: Skin is not pale.      Findings: No bruising or erythema.   Neurological:      General: No focal deficit present.      Mental Status: He is alert and oriented to person, place, and time. Mental status is at baseline.      Motor: No weakness.      Coordination: Coordination normal.      Gait: Gait normal.   Psychiatric:         Mood and Affect: Mood normal.         Behavior: Behavior normal.         Thought Content: Thought content normal.         Judgment: Judgment normal.         Assessment:       1. Type 2 diabetes mellitus without complication, without long-term current use of insulin    2. Screening cholesterol level    3. Screening for HIV without presence of risk factors    4. Routine health maintenance    5. Colon cancer screening    6. Encounter for diabetic foot exam    7.  Encounter for hepatitis C screening test for low risk patient           Plan:       Type 2 diabetes mellitus without complication, without long-term current use of insulin  -     Microalbumin/creatinine urine ratio; Future; Expected date: 09/06/2022  -     Ambulatory referral/consult to Podiatry; Future; Expected date: 09/13/2022  -     Hemoglobin A1C; Future; Expected date: 12/06/2022  -     glipiZIDE (GLUCOTROL) 10 MG TR24; Take 1 tablet (10 mg total) by mouth daily with breakfast.  Dispense: 30 tablet; Refill: 2  -     metFORMIN (GLUCOPHAGE-XR) 750 MG ER 24hr tablet; Take 2 tablets (1,500 mg total) by mouth daily with breakfast.  Dispense: 60 tablet; Refill: 2  -     empagliflozin (JARDIANCE) 25 mg tablet; Take 1 tablet (25 mg total) by mouth once daily.  Dispense: 30 tablet; Refill: 2    Screening cholesterol level  -     Lipid Panel; Future; Expected date: 09/06/2022    Screening for HIV without presence of risk factors  -     HIV 1/2 Ag/Ab (4th Gen); Future; Expected date: 09/06/2022    Routine health maintenance  -     TSH; Future; Expected date: 09/06/2022    Colon cancer screening  -     Cologuard Screening (Multitarget Stool DNA); Future; Expected date: 09/06/2022    Encounter for diabetic foot exam  -     Ambulatory referral/consult to Podiatry; Future; Expected date: 09/13/2022    Encounter for hepatitis C screening test for low risk patient  -     Hepatitis C Antibody; Future; Expected date: 09/06/2022      Follow up in about 3 months (around 12/6/2022), or if symptoms worsen or fail to improve, for Diabetes.       Discussed the following complications of DM Type 2: CAD, CVD, Retinopathy/Renal Failure, Nephropathy, Neuropathy.    Discussed Target A1C Goal <7.0% and Target fasting blood sugars () before each meal.    Discussed Lifestyle Modifications: Low glycemic index diet, Exercise (30-45 min) daily, Weight loss, and Smoking cessation      Counseled on heart healthy diet rich in fiber, fresh  vegetables and fruit and low in saturated fats (fried foods, red meat, etc.).  I also recommend regular exercise including a minimum of 150 minutes of cardio exercise per week and 20-30 minute workouts of strength training like light weights, yoga, pilates, etc.      9/6/2022 YESSENIA Ellis FNP-C      Answers submitted by the patient for this visit:  Diabetes Questionnaire (Submitted on 9/6/2022)  Chief Complaint: Diabetes problem  Diabetes type: type 2  MedicAlert ID: No  Disease duration: 7 Years  blurred vision: No  chest pain: No  fatigue: Yes  foot paresthesias: No  foot ulcerations: No  polydipsia: Yes  polyphagia: Yes  polyuria: Yes  visual change: No  weakness: No  weight loss: No  confusion: No  dizziness: No  headaches: No  hunger: No  mood changes: No  nervous/anxious: No  pallor: No  seizures: No  sleepiness: No  speech difficulty: No  sweats: No  tremors: No  blackouts: No  hospitalization: No  nocturnal hypoglycemia: No  required assistance: No  required glucagon: No  CVA: No  heart disease: No  impotence: No  nephropathy: No  peripheral neuropathy: No  PVD: No  retinopathy: No  autonomic neuropathy: No  CAD risks: hypertension, obesity, diabetes mellitus, male sex  Current treatments: oral agent (triple therapy)  Treatment compliance: most of the time  Home blood tests: 3-4 x per week  Monitoring compliance: good  Weight trend: fluctuating minimally  Current diet: generally healthy  Meal planning: avoidance of concentrated sweets, carbohydrate counting  Exercise: never  Dietitian visit: No  Eye exam current: Yes  Sees podiatrist: No

## 2022-12-07 ENCOUNTER — OFFICE VISIT (OUTPATIENT)
Dept: FAMILY MEDICINE | Facility: CLINIC | Age: 47
End: 2022-12-07
Payer: MEDICAID

## 2022-12-07 VITALS
DIASTOLIC BLOOD PRESSURE: 78 MMHG | TEMPERATURE: 98 F | WEIGHT: 315 LBS | HEART RATE: 93 BPM | OXYGEN SATURATION: 95 % | SYSTOLIC BLOOD PRESSURE: 114 MMHG | BODY MASS INDEX: 47.74 KG/M2 | RESPIRATION RATE: 18 BRPM | HEIGHT: 68 IN

## 2022-12-07 DIAGNOSIS — E78.1 HYPERTRIGLYCERIDEMIA WITHOUT HYPERCHOLESTEROLEMIA: Primary | ICD-10-CM

## 2022-12-07 DIAGNOSIS — E11.9 TYPE 2 DIABETES MELLITUS WITHOUT COMPLICATION, WITHOUT LONG-TERM CURRENT USE OF INSULIN: ICD-10-CM

## 2022-12-07 DIAGNOSIS — E66.01 CLASS 3 SEVERE OBESITY WITH BODY MASS INDEX (BMI) OF 50.0 TO 59.9 IN ADULT, UNSPECIFIED OBESITY TYPE, UNSPECIFIED WHETHER SERIOUS COMORBIDITY PRESENT: ICD-10-CM

## 2022-12-07 PROCEDURE — 3078F PR MOST RECENT DIASTOLIC BLOOD PRESSURE < 80 MM HG: ICD-10-PCS | Mod: CPTII,,, | Performed by: NURSE PRACTITIONER

## 2022-12-07 PROCEDURE — 3074F PR MOST RECENT SYSTOLIC BLOOD PRESSURE < 130 MM HG: ICD-10-PCS | Mod: CPTII,,, | Performed by: NURSE PRACTITIONER

## 2022-12-07 PROCEDURE — 3046F PR MOST RECENT HEMOGLOBIN A1C LEVEL > 9.0%: ICD-10-PCS | Mod: CPTII,,, | Performed by: NURSE PRACTITIONER

## 2022-12-07 PROCEDURE — 99213 PR OFFICE/OUTPT VISIT, EST, LEVL III, 20-29 MIN: ICD-10-PCS | Mod: S$PBB,,, | Performed by: NURSE PRACTITIONER

## 2022-12-07 PROCEDURE — 3074F SYST BP LT 130 MM HG: CPT | Mod: CPTII,,, | Performed by: NURSE PRACTITIONER

## 2022-12-07 PROCEDURE — 3008F PR BODY MASS INDEX (BMI) DOCUMENTED: ICD-10-PCS | Mod: CPTII,,, | Performed by: NURSE PRACTITIONER

## 2022-12-07 PROCEDURE — 99213 OFFICE O/P EST LOW 20 MIN: CPT | Mod: S$PBB,,, | Performed by: NURSE PRACTITIONER

## 2022-12-07 PROCEDURE — 1159F PR MEDICATION LIST DOCUMENTED IN MEDICAL RECORD: ICD-10-PCS | Mod: CPTII,,, | Performed by: NURSE PRACTITIONER

## 2022-12-07 PROCEDURE — 1159F MED LIST DOCD IN RCRD: CPT | Mod: CPTII,,, | Performed by: NURSE PRACTITIONER

## 2022-12-07 PROCEDURE — 4010F PR ACE/ARB THEARPY RXD/TAKEN: ICD-10-PCS | Mod: CPTII,,, | Performed by: NURSE PRACTITIONER

## 2022-12-07 PROCEDURE — 4010F ACE/ARB THERAPY RXD/TAKEN: CPT | Mod: CPTII,,, | Performed by: NURSE PRACTITIONER

## 2022-12-07 PROCEDURE — 3008F BODY MASS INDEX DOCD: CPT | Mod: CPTII,,, | Performed by: NURSE PRACTITIONER

## 2022-12-07 PROCEDURE — 3078F DIAST BP <80 MM HG: CPT | Mod: CPTII,,, | Performed by: NURSE PRACTITIONER

## 2022-12-07 PROCEDURE — 99214 OFFICE O/P EST MOD 30 MIN: CPT | Performed by: NURSE PRACTITIONER

## 2022-12-07 PROCEDURE — 3046F HEMOGLOBIN A1C LEVEL >9.0%: CPT | Mod: CPTII,,, | Performed by: NURSE PRACTITIONER

## 2022-12-07 RX ORDER — FENOFIBRATE 160 MG/1
160 TABLET ORAL DAILY
Qty: 90 TABLET | Refills: 1 | Status: ON HOLD | OUTPATIENT
Start: 2022-12-07 | End: 2023-06-12

## 2022-12-07 RX ORDER — METFORMIN HYDROCHLORIDE 750 MG/1
1500 TABLET, EXTENDED RELEASE ORAL
Qty: 180 TABLET | Refills: 0 | Status: ON HOLD | OUTPATIENT
Start: 2022-12-07 | End: 2023-06-12

## 2022-12-07 RX ORDER — GLIPIZIDE 10 MG/1
10 TABLET, FILM COATED, EXTENDED RELEASE ORAL
Qty: 90 TABLET | Refills: 0 | Status: ON HOLD | OUTPATIENT
Start: 2022-12-07 | End: 2023-06-12

## 2022-12-07 RX ORDER — DULAGLUTIDE 0.75 MG/.5ML
INJECTION, SOLUTION SUBCUTANEOUS
COMMUNITY
Start: 2022-11-07 | End: 2023-09-21 | Stop reason: ALTCHOICE

## 2022-12-07 NOTE — PROGRESS NOTES
Patient ID: Kehinde Soriano is a 47 y.o. male.    Chief Complaint: Diabetes (F/u)    Mr. Soriano presents today for 3 month follow up. He states he has been doing well in the interval but he does state he has not been taking medication as  prescribed or following specific diet. He states he has lost 5 lbs in this interval. He is currently awaiting results from stress test and echo and has visit with cardiologist this afternoon. Trulicity was added to regimen as well by cardiology . He had eye exam in the interval but has not done anything else health maintenance wise. He denies any other issues or complaints.     Blood work was done under cardiology orders. [place]    Past Medical History:   Diagnosis Date    Diabetes mellitus, type 2     Diverticulitis of colon     Hyperlipidemia     Hypertension     Sleep apnea      Past Surgical History:   Procedure Laterality Date    ABDOMINAL SURGERY      APPENDECTOMY      COLON SURGERY      HERNIA REPAIR           Tobacco History:  reports that he has quit smoking. His smoking use included cigarettes. He has a 5.00 pack-year smoking history. He has been exposed to tobacco smoke. He has never used smokeless tobacco.      Review of patient's allergies indicates:  No Known Allergies    Current Outpatient Medications:     atorvastatin (LIPITOR) 20 MG tablet, Take 1 tablet (20 mg total) by mouth once daily., Disp: 30 tablet, Rfl: 0    blood sugar diagnostic Strp, OneTouch Verio test strips  TEST BLOOD SUGAR BID AND PRN, Disp: , Rfl:     cholecalciferol, vitamin D3, 1,250 mcg (50,000 unit) capsule, cholecalciferol (vitamin D3) 1,250 mcg (50,000 unit) capsule  TAKE ONE CAPSULE BY MOUTH EVERY WEEK, Disp: , Rfl:     lancets 33 gauge Misc, OneTouch Delica Plus Lancet 33 gauge  USE TWICE DAILY AS DIRECTED AND AS NEEDED FOR BLOOD GLUCOSE TESTING, Disp: , Rfl:     losartan (COZAAR) 50 MG tablet, Take 50 mg by mouth once daily., Disp: , Rfl:     ONETOUCH DELICA PLUS  LANCET 33 gauge Misc, Apply topically 2 (two) times daily as needed., Disp: , Rfl:     ONETOUCH VERIO TEST STRIPS Strp, 2 (two) times daily as needed., Disp: , Rfl:     TRULICITY 0.75 mg/0.5 mL pen injector, Inject into the skin., Disp: , Rfl:     empagliflozin (JARDIANCE) 25 mg tablet, Take 1 tablet (25 mg total) by mouth once daily., Disp: 90 tablet, Rfl: 0    fenofibrate 160 MG Tab, Take 1 tablet (160 mg total) by mouth once daily., Disp: 90 tablet, Rfl: 1    glipiZIDE (GLUCOTROL) 10 MG TR24, Take 1 tablet (10 mg total) by mouth daily with breakfast., Disp: 90 tablet, Rfl: 0    metFORMIN (GLUCOPHAGE-XR) 750 MG ER 24hr tablet, Take 2 tablets (1,500 mg total) by mouth daily with breakfast., Disp: 180 tablet, Rfl: 0    Review of Systems   Constitutional:  Positive for activity change, fatigue and unexpected weight change. Negative for fever.   HENT:  Negative for congestion, dental problem, nosebleeds, postnasal drip, rhinorrhea, sinus pain, sore throat and tinnitus.    Eyes:  Negative for pain, discharge, itching and visual disturbance.   Respiratory:  Negative for cough, chest tightness, shortness of breath and wheezing.    Cardiovascular:  Negative for chest pain.   Gastrointestinal:  Negative for abdominal pain, blood in stool, constipation, diarrhea, nausea and vomiting.   Endocrine: Positive for polydipsia, polyphagia and polyuria. Negative for cold intolerance and heat intolerance.   Genitourinary:  Negative for difficulty urinating, flank pain, genital sores, hematuria and urgency.   Musculoskeletal:  Negative for arthralgias and myalgias.   Skin:  Negative for pallor, rash and wound.   Allergic/Immunologic: Negative for environmental allergies and food allergies.   Neurological:  Negative for dizziness, tremors, seizures, speech difficulty, weakness, light-headedness and headaches.   Hematological:  Negative for adenopathy. Does not bruise/bleed easily.   Psychiatric/Behavioral:  Negative for behavioral  "problems, confusion, decreased concentration, sleep disturbance and suicidal ideas. The patient is not nervous/anxious and is not hyperactive.         Objective:      Vitals:    12/07/22 0811   BP: 114/78   Pulse: 93   Resp: 18   Temp: 98.4 °F (36.9 °C)   SpO2: 95%   Weight: (!) 159 kg (350 lb 9.6 oz)   Height: 5' 8" (1.727 m)     Physical Exam  Vitals reviewed.   Constitutional:       General: He is not in acute distress.     Appearance: Normal appearance. He is morbidly obese. He is not ill-appearing, toxic-appearing or diaphoretic.      Comments: BMI 53.31   HENT:      Head: Normocephalic and atraumatic.      Right Ear: Tympanic membrane and external ear normal. There is no impacted cerumen.      Left Ear: Tympanic membrane and external ear normal. There is no impacted cerumen.      Nose: Nose normal. No congestion or rhinorrhea.      Mouth/Throat:      Mouth: Mucous membranes are moist.      Pharynx: Oropharynx is clear. No oropharyngeal exudate or posterior oropharyngeal erythema.   Eyes:      General: No scleral icterus.        Right eye: No discharge.         Left eye: No discharge.      Extraocular Movements: Extraocular movements intact.      Conjunctiva/sclera: Conjunctivae normal.      Pupils: Pupils are equal, round, and reactive to light.   Cardiovascular:      Rate and Rhythm: Normal rate and regular rhythm.      Pulses: Normal pulses.      Heart sounds: Normal heart sounds. No murmur heard.    No friction rub. No gallop.   Pulmonary:      Effort: Pulmonary effort is normal. No respiratory distress.      Breath sounds: Normal breath sounds. No wheezing, rhonchi or rales.   Chest:      Chest wall: No tenderness.   Abdominal:      General: Abdomen is protuberant. Bowel sounds are normal.      Palpations: Abdomen is soft. There is no mass.      Tenderness: There is no abdominal tenderness. There is no guarding or rebound.   Musculoskeletal:         General: No swelling or signs of injury. Normal range of " motion.      Cervical back: Normal range of motion and neck supple. No rigidity or tenderness.      Right lower leg: No edema.      Left lower leg: No edema.   Skin:     General: Skin is warm and dry.      Capillary Refill: Capillary refill takes less than 2 seconds.      Coloration: Skin is not pale.      Findings: No bruising or erythema.   Neurological:      General: No focal deficit present.      Mental Status: He is alert and oriented to person, place, and time. Mental status is at baseline.      Motor: No weakness.      Coordination: Coordination normal.      Gait: Gait normal.   Psychiatric:         Mood and Affect: Mood normal.         Behavior: Behavior normal.         Thought Content: Thought content normal.         Judgment: Judgment normal.         Assessment:       1. Hypertriglyceridemia without hypercholesterolemia    2. Type 2 diabetes mellitus without complication, without long-term current use of insulin    3. Class 3 severe obesity with body mass index (BMI) of 50.0 to 59.9 in adult, unspecified obesity type, unspecified whether serious comorbidity present           Plan:       Hypertriglyceridemia without hypercholesterolemia  -     fenofibrate 160 MG Tab; Take 1 tablet (160 mg total) by mouth once daily.  Dispense: 90 tablet; Refill: 1    Type 2 diabetes mellitus without complication, without long-term current use of insulin  -     empagliflozin (JARDIANCE) 25 mg tablet; Take 1 tablet (25 mg total) by mouth once daily.  Dispense: 90 tablet; Refill: 0  -     glipiZIDE (GLUCOTROL) 10 MG TR24; Take 1 tablet (10 mg total) by mouth daily with breakfast.  Dispense: 90 tablet; Refill: 0  -     metFORMIN (GLUCOPHAGE-XR) 750 MG ER 24hr tablet; Take 2 tablets (1,500 mg total) by mouth daily with breakfast.  Dispense: 180 tablet; Refill: 0    Class 3 severe obesity with body mass index (BMI) of 50.0 to 59.9 in adult, unspecified obesity type, unspecified whether serious comorbidity present      Counseled  on heart healthy diet rich in fiber, fresh vegetables and fruit and low in saturated fats (fried foods, red meat, etc.).  I also recommend regular exercise including a minimum of 150 minutes of cardio exercise per week and 20-30 minute workouts of strength training like light weights, yoga, pilates, etc.      Follow up in about 3 months (around 3/7/2023), or if symptoms worsen or fail to improve, for DM/Labs.        12/7/2022 Nancy Hollis, NP

## 2023-04-01 ENCOUNTER — OFFICE VISIT (OUTPATIENT)
Dept: URGENT CARE | Facility: CLINIC | Age: 48
End: 2023-04-01
Payer: COMMERCIAL

## 2023-04-01 VITALS
HEART RATE: 65 BPM | OXYGEN SATURATION: 97 % | HEIGHT: 68 IN | SYSTOLIC BLOOD PRESSURE: 137 MMHG | WEIGHT: 315 LBS | DIASTOLIC BLOOD PRESSURE: 76 MMHG | TEMPERATURE: 98 F | RESPIRATION RATE: 18 BRPM | BODY MASS INDEX: 47.74 KG/M2

## 2023-04-01 DIAGNOSIS — R11.10 VOMITING, UNSPECIFIED VOMITING TYPE, UNSPECIFIED WHETHER NAUSEA PRESENT: Primary | ICD-10-CM

## 2023-04-01 LAB
CTP QC/QA: YES
SARS-COV-2 AG RESP QL IA.RAPID: NEGATIVE

## 2023-04-01 PROCEDURE — 99213 OFFICE O/P EST LOW 20 MIN: CPT | Mod: S$GLB,,, | Performed by: NURSE PRACTITIONER

## 2023-04-01 PROCEDURE — 87811 SARS-COV-2 COVID19 W/OPTIC: CPT | Mod: QW,S$GLB,, | Performed by: NURSE PRACTITIONER

## 2023-04-01 PROCEDURE — 99213 PR OFFICE/OUTPT VISIT, EST, LEVL III, 20-29 MIN: ICD-10-PCS | Mod: S$GLB,,, | Performed by: NURSE PRACTITIONER

## 2023-04-01 PROCEDURE — 87811 SARS CORONAVIRUS 2 ANTIGEN POCT, MANUAL READ: ICD-10-PCS | Mod: QW,S$GLB,, | Performed by: NURSE PRACTITIONER

## 2023-04-01 RX ORDER — ONDANSETRON 4 MG/1
4 TABLET, ORALLY DISINTEGRATING ORAL EVERY 8 HOURS PRN
Qty: 20 TABLET | Refills: 0 | Status: SHIPPED | OUTPATIENT
Start: 2023-04-01 | End: 2023-09-21 | Stop reason: ALTCHOICE

## 2023-04-01 RX ORDER — DIPHENOXYLATE HYDROCHLORIDE AND ATROPINE SULFATE 2.5; .025 MG/1; MG/1
1 TABLET ORAL 4 TIMES DAILY PRN
Qty: 10 TABLET | Refills: 0 | Status: SHIPPED | OUTPATIENT
Start: 2023-04-01 | End: 2023-04-11

## 2023-04-01 NOTE — PROGRESS NOTES
"Subjective:      Patient ID: Kehinde Soriano is a 47 y.o. male.    Vitals:  height is 5' 8" (1.727 m) and weight is 161.5 kg (356 lb) (abnormal). His oral temperature is 98 °F (36.7 °C). His blood pressure is 137/76 and his pulse is 65. His respiration is 18 and oxygen saturation is 97%.     Chief Complaint: Vomiting    Pt states "he has been having nausea, vomiting, diarrhea, and abdominal pain since yesterday."    Gastrointestinal:  Positive for abdominal pain (crampy), nausea, vomiting and diarrhea.    Objective:     Physical Exam   Abdominal: Bowel sounds are normal. Soft.      Comments: Non surgical abd exam     Assessment:     1. Vomiting, unspecified vomiting type, unspecified whether nausea present        Plan:       Vomiting, unspecified vomiting type, unspecified whether nausea present  -     SARS Coronavirus 2 Antigen, POCT Manual Read    Other orders  -     ondansetron (ZOFRAN-ODT) 4 MG TbDL; Take 1 tablet (4 mg total) by mouth every 8 (eight) hours as needed (nausea).  Dispense: 20 tablet; Refill: 0  -     diphenoxylate-atropine 2.5-0.025 mg (LOMOTIL) 2.5-0.025 mg per tablet; Take 1 tablet by mouth 4 (four) times daily as needed for Diarrhea.  Dispense: 10 tablet; Refill: 0      Liquid diet  Advance to brat diet              "

## 2023-04-01 NOTE — LETTER
April 1, 2023      [facility]  [address]  [facility] [address]  Phone: [phone]       Patient: Kehinde Soriano   YOB: 1975  Date of Visit: 04/01/2023    To Whom It May Concern:    Aurea Sorinao  was at [facility] on 04/01/2023. The patient may return to work/school on 4/2/2023 with no restrictions. If you have any questions or concerns, or if I can be of further assistance, please do not hesitate to contact me.    Sincerely,    Kashif Mazariegos MA

## 2023-05-01 ENCOUNTER — HOSPITAL ENCOUNTER (EMERGENCY)
Facility: HOSPITAL | Age: 48
Discharge: HOME | End: 2023-05-01
Attending: EMERGENCY MEDICINE
Payer: COMMERCIAL

## 2023-05-01 VITALS
SYSTOLIC BLOOD PRESSURE: 151 MMHG | RESPIRATION RATE: 17 BRPM | BODY MASS INDEX: 46.65 KG/M2 | DIASTOLIC BLOOD PRESSURE: 87 MMHG | TEMPERATURE: 98 F | HEIGHT: 69 IN | OXYGEN SATURATION: 97 % | HEART RATE: 88 BPM | WEIGHT: 315 LBS

## 2023-05-01 DIAGNOSIS — S39.012A LUMBAR STRAIN, INITIAL ENCOUNTER: Primary | ICD-10-CM

## 2023-05-01 LAB
BACTERIA #/AREA URNS HPF: NEGATIVE /HPF
BILIRUB UR QL STRIP: NEGATIVE
CLARITY UR: CLEAR
COLOR UR: YELLOW
GLUCOSE UR QL STRIP: ABNORMAL
HGB UR QL STRIP: NEGATIVE
HYALINE CASTS #/AREA URNS LPF: 0 /LPF
KETONES UR QL STRIP: ABNORMAL
LEUKOCYTE ESTERASE UR QL STRIP: NEGATIVE
MICROSCOPIC COMMENT: ABNORMAL
NITRITE UR QL STRIP: NEGATIVE
PH UR STRIP: 7 [PH] (ref 5–8)
PROT UR QL STRIP: NEGATIVE
RBC #/AREA URNS HPF: 0 /HPF (ref 0–4)
SP GR UR STRIP: 1.03 (ref 1–1.03)
SQUAMOUS #/AREA URNS HPF: 0 /HPF
URN SPEC COLLECT METH UR: ABNORMAL
UROBILINOGEN UR STRIP-ACNC: NEGATIVE EU/DL
WBC #/AREA URNS HPF: 0 /HPF (ref 0–5)
YEAST URNS QL MICRO: ABNORMAL

## 2023-05-01 PROCEDURE — 81001 URINALYSIS AUTO W/SCOPE: CPT | Performed by: EMERGENCY MEDICINE

## 2023-05-01 PROCEDURE — 99283 EMERGENCY DEPT VISIT LOW MDM: CPT

## 2023-05-01 RX ORDER — TIRZEPATIDE 2.5 MG/.5ML
INJECTION, SOLUTION SUBCUTANEOUS
COMMUNITY
Start: 2023-01-31 | End: 2023-09-21 | Stop reason: DRUGHIGH

## 2023-05-01 RX ORDER — MELOXICAM 15 MG/1
15 TABLET ORAL DAILY
Qty: 15 TABLET | Refills: 0 | Status: ON HOLD | OUTPATIENT
Start: 2023-05-01 | End: 2023-06-13 | Stop reason: SDUPTHER

## 2023-05-01 RX ORDER — METHOCARBAMOL 750 MG/1
1500 TABLET, FILM COATED ORAL 3 TIMES DAILY
Qty: 30 TABLET | Refills: 0 | Status: SHIPPED | OUTPATIENT
Start: 2023-05-01 | End: 2023-05-06

## 2023-05-01 NOTE — ED PROVIDER NOTES
Encounter Date: 5/1/2023       History     Chief Complaint   Patient presents with    Back Pain     Left lower back pain x3 days, denies any injury, hx of diverticulitis     This is a 47-year-old male with history of hypertension, hyperlipidemia, prior history of diverticulitis, kidney stones, diabetes, obstructive sleep apnea who comes in complaining left lower back pain.  Patient reports that it started 3 days ago.  Denies any history of trauma at onset.  Pain has been fairly moderate and constant.  He denies any radiation of the pain to his abdomen.  He denies any dysuria or hematuria.  No nausea or vomiting.  Pain is unrelated to positional changes.  Denies any exacerbating or alleviating factors.  Denies any focal weakness or numbness.  No other complaints.    Review of patient's allergies indicates:  No Known Allergies  Past Medical History:   Diagnosis Date    Diabetes mellitus, type 2     Diverticulitis of colon     Hyperlipidemia     Hypertension     Sleep apnea      Past Surgical History:   Procedure Laterality Date    ABDOMINAL SURGERY      APPENDECTOMY      COLON SURGERY      HERNIA REPAIR       Family History   Family history unknown: Yes     Social History     Tobacco Use    Smoking status: Former     Packs/day: 0.50     Years: 10.00     Pack years: 5.00     Types: Cigarettes     Passive exposure: Current    Smokeless tobacco: Never   Substance Use Topics    Alcohol use: No     Comment: ocassionally    Drug use: No     Review of Systems   Constitutional:  Negative for chills and fever.   HENT:  Negative for congestion, sore throat and trouble swallowing.    Respiratory:  Negative for cough and shortness of breath.    Cardiovascular:  Negative for chest pain and palpitations.   Gastrointestinal:  Negative for abdominal pain, diarrhea, nausea and vomiting.   Genitourinary:  Negative for dysuria and flank pain.   Musculoskeletal:  Positive for back pain. Negative for neck pain.   Neurological:  Negative  for weakness, numbness and headaches.   Psychiatric/Behavioral:  Negative for agitation and confusion.    All other systems reviewed and are negative.    Physical Exam     Initial Vitals [05/01/23 1116]   BP Pulse Resp Temp SpO2   (!) 160/95 88 18 98.3 °F (36.8 °C) 95 %      MAP       --         Physical Exam    Nursing note and vitals reviewed.  Constitutional: Vital signs are normal. He appears well-developed and well-nourished.  Non-toxic appearance. No distress.   HENT:   Head: Normocephalic and atraumatic.   Mouth/Throat: Oropharynx is clear and moist.   Eyes: Conjunctivae and EOM are normal. Pupils are equal, round, and reactive to light.   Neck: Neck supple.   Normal range of motion.  Cardiovascular:  Normal rate, regular rhythm and intact distal pulses.           Pulmonary/Chest: Breath sounds normal. He has no wheezes.   Abdominal: Abdomen is soft. Bowel sounds are normal. There is no abdominal tenderness.   Musculoskeletal:         General: Tenderness present. No edema. Normal range of motion.      Cervical back: Normal range of motion and neck supple. No rigidity. No muscular tenderness.      Comments: Left-sided paraspinal tenderness to palpation with no midline tenderness to palpation or step-offs.     Lymphadenopathy:     He has no cervical adenopathy.     He has no axillary adenopathy.   Neurological: He is alert and oriented to person, place, and time. He has normal strength. No cranial nerve deficit or sensory deficit. Gait normal.   Skin: Skin is warm, dry and intact.   Psychiatric: He has a normal mood and affect. His behavior is normal.       ED Course   Procedures  Labs Reviewed   URINALYSIS, REFLEX TO URINE CULTURE - Abnormal; Notable for the following components:       Result Value    Glucose, UA 4+ (*)     Ketones, UA Trace (*)     All other components within normal limits    Narrative:     Specimen Source->Urine   URINALYSIS MICROSCOPIC - Abnormal; Notable for the following components:     Hyaline Casts, UA 0.00 (*)     All other components within normal limits    Narrative:     Specimen Source->Urine          Imaging Results    None          Medications - No data to display  Medical Decision Making:   Initial Assessment:   This is a 47-year-old male with history of diabetes, hypertension, hyperlipidemia, diverticulitis, prior history of kidney stone who comes in complaining of back pain.  On examination he is hypertensive.  On physical exam has paraspinal lumbar tenderness to palpation.  Exam is normal otherwise.  He is neurologically intact.  He has a negative straight leg raise.    Orders included a UA.    Comorbidities contributing to patient's presentation: Hypertension, diabetes, prior history of diverticulitis and kidney stone.  Exacerbation of chronic illness: Patient's blood pressure is fairly elevated.    I reviewed patient's external records, patient was evaluated urgent care 1 month ago and was found to be COVID-19 positive.  Differential Diagnosis:   Musculoskeletal pain, lumbar strain, cystitis, pyelonephritis, diverticulitis, ureterolithiasis.  Clinical Tests:   Lab Tests: Ordered and Reviewed       <> Summary of Lab: UA is unremarkable.  ED Management:  Patient's workup is unremarkable.  His pain is reproducible and consistent with musculoskeletal pain.  He will be discharged with Mobic and Robaxin outpatient follow-up.  He is to return to the ER for any concerns.                        Clinical Impression:   Final diagnoses:  [S39.012A] Lumbar strain, initial encounter (Primary)        ED Disposition Condition    Discharge Stable          ED Prescriptions       Medication Sig Dispense Start Date End Date Auth. Provider    methocarbamoL (ROBAXIN) 750 MG Tab Take 2 tablets (1,500 mg total) by mouth 3 (three) times daily. for 5 days 30 tablet 5/1/2023 5/6/2023 Fallon Robbins MD    meloxicam (MOBIC) 15 MG tablet Take 1 tablet (15 mg total) by mouth once daily. 15 tablet 5/1/2023 -- Fallon  GENESIS Robbins MD          Follow-up Information       Follow up With Specialties Details Why Contact Info    Nancy Hollis, FNP-C Family Medicine In 2 days  [address]  [address]  [facility] [address]  [phone]               Fallon Robbins MD  05/01/23 6004

## 2023-05-30 ENCOUNTER — PATIENT OUTREACH (OUTPATIENT)
Dept: FAMILY MEDICINE | Facility: CLINIC | Age: 48
End: 2023-05-30

## 2023-05-30 ENCOUNTER — PATIENT MESSAGE (OUTPATIENT)
Dept: FAMILY MEDICINE | Facility: CLINIC | Age: 48
End: 2023-05-30

## 2023-05-30 NOTE — TELEPHONE ENCOUNTER
Patient Outreach - Health Maintenance - HTN  LVM -  Overdue.  LVM for patient to call and schedule  a follow-up visit

## 2023-05-30 NOTE — TELEPHONE ENCOUNTER
Patient Outreach - Health Maintenance - HTN  Patient overdue for HTN check.  Portal Message sent in addition to the VM.

## 2023-05-31 DIAGNOSIS — R94.5 NONSPECIFIC ABNORMAL RESULTS OF LIVER FUNCTION STUDY: Primary | ICD-10-CM

## 2023-06-10 DIAGNOSIS — E78.1 HYPERTRIGLYCERIDEMIA WITHOUT HYPERCHOLESTEROLEMIA: ICD-10-CM

## 2023-06-10 DIAGNOSIS — E11.9 TYPE 2 DIABETES MELLITUS WITHOUT COMPLICATION, WITHOUT LONG-TERM CURRENT USE OF INSULIN: ICD-10-CM

## 2023-06-11 ENCOUNTER — HOSPITAL ENCOUNTER (INPATIENT)
Facility: HOSPITAL | Age: 48
LOS: 1 days | Discharge: HOME | DRG: 603 | End: 2023-06-13
Attending: EMERGENCY MEDICINE | Admitting: STUDENT IN AN ORGANIZED HEALTH CARE EDUCATION/TRAINING PROGRAM
Payer: COMMERCIAL

## 2023-06-11 DIAGNOSIS — R07.9 CHEST PAIN: ICD-10-CM

## 2023-06-11 DIAGNOSIS — L03.90 CELLULITIS: Primary | ICD-10-CM

## 2023-06-11 PROBLEM — E66.01 SEVERE OBESITY: Status: ACTIVE | Noted: 2021-11-03

## 2023-06-11 PROBLEM — R79.89 LOW VITAMIN D LEVEL: Status: ACTIVE | Noted: 2020-06-19

## 2023-06-11 PROBLEM — R09.02 HYPOXIA: Status: RESOLVED | Noted: 2022-08-18 | Resolved: 2023-06-11

## 2023-06-11 PROBLEM — R07.89 CHEST PRESSURE: Status: RESOLVED | Noted: 2022-08-18 | Resolved: 2023-06-11

## 2023-06-11 PROBLEM — E78.5 HYPERLIPIDEMIA: Status: ACTIVE | Noted: 2018-07-16

## 2023-06-11 PROBLEM — M25.50 MULTIPLE JOINT PAIN: Status: ACTIVE | Noted: 2020-06-09

## 2023-06-11 PROBLEM — S93.602A FOOT SPRAIN, LEFT, INITIAL ENCOUNTER: Status: RESOLVED | Noted: 2018-09-29 | Resolved: 2023-06-11

## 2023-06-11 PROBLEM — M72.2 PLANTAR FASCIITIS: Status: ACTIVE | Noted: 2021-02-10

## 2023-06-11 LAB
ANION GAP SERPL CALC-SCNC: 14 MMOL/L (ref 8–16)
BASOPHILS # BLD AUTO: 0.08 K/UL (ref 0–0.2)
BASOPHILS NFR BLD: 1.1 % (ref 0–1.9)
BUN SERPL-MCNC: 17 MG/DL (ref 6–20)
CALCIUM SERPL-MCNC: 9.4 MG/DL (ref 8.7–10.5)
CHLORIDE SERPL-SCNC: 100 MMOL/L (ref 95–110)
CO2 SERPL-SCNC: 21 MMOL/L (ref 23–29)
CREAT SERPL-MCNC: 1 MG/DL (ref 0.5–1.4)
DIFFERENTIAL METHOD: ABNORMAL
EOSINOPHIL # BLD AUTO: 0.2 K/UL (ref 0–0.5)
EOSINOPHIL NFR BLD: 2.3 % (ref 0–8)
ERYTHROCYTE [DISTWIDTH] IN BLOOD BY AUTOMATED COUNT: 13.3 % (ref 11.5–14.5)
EST. GFR  (NO RACE VARIABLE): >60 ML/MIN/1.73 M^2
GLUCOSE SERPL-MCNC: 323 MG/DL (ref 70–110)
HCT VFR BLD AUTO: 40.4 % (ref 40–54)
HGB BLD-MCNC: 13.8 G/DL (ref 14–18)
IMM GRANULOCYTES # BLD AUTO: 0.23 K/UL (ref 0–0.04)
IMM GRANULOCYTES NFR BLD AUTO: 3.1 % (ref 0–0.5)
LYMPHOCYTES # BLD AUTO: 0.9 K/UL (ref 1–4.8)
LYMPHOCYTES NFR BLD: 12.7 % (ref 18–48)
MCH RBC QN AUTO: 31.7 PG (ref 27–31)
MCHC RBC AUTO-ENTMCNC: 34.2 G/DL (ref 32–36)
MCV RBC AUTO: 93 FL (ref 82–98)
MONOCYTES # BLD AUTO: 0.9 K/UL (ref 0.3–1)
MONOCYTES NFR BLD: 12.4 % (ref 4–15)
NEUTROPHILS # BLD AUTO: 5.1 K/UL (ref 1.8–7.7)
NEUTROPHILS NFR BLD: 68.4 % (ref 38–73)
NRBC BLD-RTO: 0 /100 WBC
PLATELET # BLD AUTO: 172 K/UL (ref 150–450)
PMV BLD AUTO: 10.7 FL (ref 9.2–12.9)
POCT GLUCOSE: 169 MG/DL (ref 70–110)
POCT GLUCOSE: 193 MG/DL (ref 70–110)
POTASSIUM SERPL-SCNC: 4.3 MMOL/L (ref 3.5–5.1)
RBC # BLD AUTO: 4.36 M/UL (ref 4.6–6.2)
SODIUM SERPL-SCNC: 135 MMOL/L (ref 136–145)
WBC # BLD AUTO: 7.41 K/UL (ref 3.9–12.7)

## 2023-06-11 PROCEDURE — 96372 THER/PROPH/DIAG INJ SC/IM: CPT | Performed by: STUDENT IN AN ORGANIZED HEALTH CARE EDUCATION/TRAINING PROGRAM

## 2023-06-11 PROCEDURE — 63600175 PHARM REV CODE 636 W HCPCS: Performed by: STUDENT IN AN ORGANIZED HEALTH CARE EDUCATION/TRAINING PROGRAM

## 2023-06-11 PROCEDURE — 25000003 PHARM REV CODE 250: Performed by: STUDENT IN AN ORGANIZED HEALTH CARE EDUCATION/TRAINING PROGRAM

## 2023-06-11 PROCEDURE — 87040 BLOOD CULTURE FOR BACTERIA: CPT | Performed by: STUDENT IN AN ORGANIZED HEALTH CARE EDUCATION/TRAINING PROGRAM

## 2023-06-11 PROCEDURE — 96365 THER/PROPH/DIAG IV INF INIT: CPT

## 2023-06-11 PROCEDURE — 36415 COLL VENOUS BLD VENIPUNCTURE: CPT | Performed by: NURSE PRACTITIONER

## 2023-06-11 PROCEDURE — G0378 HOSPITAL OBSERVATION PER HR: HCPCS

## 2023-06-11 PROCEDURE — 94760 N-INVAS EAR/PLS OXIMETRY 1: CPT

## 2023-06-11 PROCEDURE — 99285 EMERGENCY DEPT VISIT HI MDM: CPT | Mod: 25

## 2023-06-11 PROCEDURE — 96367 TX/PROPH/DG ADDL SEQ IV INF: CPT

## 2023-06-11 PROCEDURE — 85025 COMPLETE CBC W/AUTO DIFF WBC: CPT | Performed by: NURSE PRACTITIONER

## 2023-06-11 PROCEDURE — 96366 THER/PROPH/DIAG IV INF ADDON: CPT

## 2023-06-11 PROCEDURE — 80048 BASIC METABOLIC PNL TOTAL CA: CPT | Performed by: NURSE PRACTITIONER

## 2023-06-11 PROCEDURE — 82962 GLUCOSE BLOOD TEST: CPT

## 2023-06-11 RX ORDER — ACETAMINOPHEN 325 MG/1
650 TABLET ORAL EVERY 8 HOURS PRN
Status: DISCONTINUED | OUTPATIENT
Start: 2023-06-11 | End: 2023-06-13 | Stop reason: HOSPADM

## 2023-06-11 RX ORDER — LOSARTAN POTASSIUM 25 MG/1
50 TABLET ORAL DAILY
Status: DISCONTINUED | OUTPATIENT
Start: 2023-06-12 | End: 2023-06-13 | Stop reason: HOSPADM

## 2023-06-11 RX ORDER — IBUPROFEN 200 MG
24 TABLET ORAL
Status: DISCONTINUED | OUTPATIENT
Start: 2023-06-11 | End: 2023-06-13 | Stop reason: HOSPADM

## 2023-06-11 RX ORDER — ATORVASTATIN CALCIUM 20 MG/1
20 TABLET, FILM COATED ORAL DAILY
Status: DISCONTINUED | OUTPATIENT
Start: 2023-06-12 | End: 2023-06-13 | Stop reason: HOSPADM

## 2023-06-11 RX ORDER — INSULIN ASPART 100 [IU]/ML
1-10 INJECTION, SOLUTION INTRAVENOUS; SUBCUTANEOUS
Status: DISCONTINUED | OUTPATIENT
Start: 2023-06-11 | End: 2023-06-13 | Stop reason: HOSPADM

## 2023-06-11 RX ORDER — TRAMADOL HYDROCHLORIDE 50 MG/1
50 TABLET ORAL EVERY 6 HOURS PRN
Status: DISCONTINUED | OUTPATIENT
Start: 2023-06-11 | End: 2023-06-13 | Stop reason: HOSPADM

## 2023-06-11 RX ORDER — NALOXONE HCL 0.4 MG/ML
0.02 VIAL (ML) INJECTION
Status: DISCONTINUED | OUTPATIENT
Start: 2023-06-11 | End: 2023-06-13 | Stop reason: HOSPADM

## 2023-06-11 RX ORDER — IBUPROFEN 200 MG
16 TABLET ORAL
Status: DISCONTINUED | OUTPATIENT
Start: 2023-06-11 | End: 2023-06-13 | Stop reason: HOSPADM

## 2023-06-11 RX ORDER — FENOFIBRATE 160 MG/1
160 TABLET ORAL DAILY
Status: DISCONTINUED | OUTPATIENT
Start: 2023-06-12 | End: 2023-06-13 | Stop reason: HOSPADM

## 2023-06-11 RX ORDER — ENOXAPARIN SODIUM 100 MG/ML
40 INJECTION SUBCUTANEOUS EVERY 24 HOURS
Status: DISCONTINUED | OUTPATIENT
Start: 2023-06-11 | End: 2023-06-13 | Stop reason: HOSPADM

## 2023-06-11 RX ORDER — ONDANSETRON 2 MG/ML
4 INJECTION INTRAMUSCULAR; INTRAVENOUS EVERY 8 HOURS PRN
Status: DISCONTINUED | OUTPATIENT
Start: 2023-06-11 | End: 2023-06-13 | Stop reason: HOSPADM

## 2023-06-11 RX ORDER — GLUCAGON 1 MG
1 KIT INJECTION
Status: DISCONTINUED | OUTPATIENT
Start: 2023-06-11 | End: 2023-06-13 | Stop reason: HOSPADM

## 2023-06-11 RX ORDER — CEFAZOLIN SODIUM 2 G/50ML
2 SOLUTION INTRAVENOUS
Status: DISCONTINUED | OUTPATIENT
Start: 2023-06-11 | End: 2023-06-13 | Stop reason: HOSPADM

## 2023-06-11 RX ORDER — SODIUM CHLORIDE 0.9 % (FLUSH) 0.9 %
10 SYRINGE (ML) INJECTION
Status: DISCONTINUED | OUTPATIENT
Start: 2023-06-11 | End: 2023-06-13 | Stop reason: HOSPADM

## 2023-06-11 RX ADMIN — TRAMADOL HYDROCHLORIDE 50 MG: 50 TABLET, COATED ORAL at 07:06

## 2023-06-11 RX ADMIN — VANCOMYCIN HYDROCHLORIDE 2000 MG: 1 INJECTION, POWDER, LYOPHILIZED, FOR SOLUTION INTRAVENOUS at 07:06

## 2023-06-11 RX ADMIN — ENOXAPARIN SODIUM 40 MG: 40 INJECTION SUBCUTANEOUS at 07:06

## 2023-06-11 RX ADMIN — CEFAZOLIN SODIUM 2 G: 2 SOLUTION INTRAVENOUS at 06:06

## 2023-06-11 RX ADMIN — INSULIN ASPART 1 UNITS: 100 INJECTION, SOLUTION INTRAVENOUS; SUBCUTANEOUS at 09:06

## 2023-06-11 NOTE — HPI
Kehinde Soriano is a 47 year old male with a past medical history of DM, HTN, HLD, JANKI, and obesity who presents with one day of LLE redness, tenderness, and swelling associated with fever and malaise. He denies any trauma to this extremity. He states that he took clindamycin PO for one day after being seen in an urgent care which did not improve the LLE. Hospital Medicine was consulted for admission.

## 2023-06-11 NOTE — FIRST PROVIDER EVALUATION
Emergency Department TeleTriage Encounter Note      CHIEF COMPLAINT    Chief Complaint   Patient presents with    Rash     Pt has a rash on his leg, urgent care advised pt the rash was cellulitis.         VITAL SIGNS   Initial Vitals [06/11/23 1547]   BP Pulse Resp Temp SpO2   136/85 (!) 117 19 99.1 °F (37.3 °C) 95 %      MAP       --            ALLERGIES    Review of patient's allergies indicates:  No Known Allergies    PROVIDER TRIAGE NOTE  This is a teletriage evaluation of a 47 y.o. male presenting to the ED complaining of rash on leg starting yesterday. Reports fever Tmax 102 starting yesterday. Pmhx of DM. States  told him the rash is cellulitis.  No meds prior to arrival today.     Afebrile in the ED but pt is tachycardic. Unable to see patient's area of concern in teletriage.  Pt is well-appearing, no distress.  Due to tachycardia, reported fever, and pmhx of DM, will start with basic labs.     Initial orders will be placed and care will be transferred to an alternate provider when patient is roomed for a full evaluation. Any additional orders and the final disposition will be determined by that provider.         ORDERS  Labs Reviewed   CBC W/ AUTO DIFFERENTIAL   BASIC METABOLIC PANEL   POCT GLUCOSE MONITORING CONTINUOUS       ED Orders (720h ago, onward)      Start Ordered     Status Ordering Provider    06/11/23 1614 06/11/23 1614  POCT glucose  Once         Ordered CRYSTAL LANGLEY.    06/11/23 1614 06/11/23 1614  Insert Saline lock IV  Once         Ordered CRYSTAL LANGLEY.    06/11/23 1614 06/11/23 1614  Complete Blood Count (CBC)  STAT         Ordered CRYSTAL LANGLEY.    06/11/23 1614 06/11/23 1614  Basic Metabolic Panel (BMP)  STAT         Ordered CRYSTAL LANGLEY              Virtual Visit Note: The provider triage portion of this emergency department evaluation and documentation was performed via [employer], a HIPAA-compliant telemedicine application, in  concert with a tele-presenter in the room. A face to face patient evaluation with one of my colleagues will occur once the patient is placed in an emergency department room.      DISCLAIMER: This note was prepared with [employer] voice recognition transcription software. Garbled syntax, mangled pronouns, and other bizarre constructions may be attributed to that software system.

## 2023-06-11 NOTE — ASSESSMENT & PLAN NOTE
Patient's FSGs are controlled on current medication regimen.  Last A1c reviewed-   Lab Results   Component Value Date    HGBA1C 9.3 (H) 08/18/2022     Most recent fingerstick glucose reviewed- No results for input(s): POCTGLUCOSE in the last 24 hours.  Current correctional scale  Medium  Maintain anti-hyperglycemic dose as follows-   Antihyperglycemics (From admission, onward)    Start     Stop Route Frequency Ordered    06/11/23 1836  insulin aspart U-100 pen 1-10 Units         -- SubQ Before meals & nightly PRN 06/11/23 1738        Hold Oral hypoglycemics while patient is in the hospital.

## 2023-06-11 NOTE — H&P
[facility]  History & Physical    Patient Name: Kehinde Soriano  MRN: [id number]  Patient Class: OP- Observation  Admission Date: 6/11/2023  Attending Physician: Kishore Brown MD  Primary Care Provider: GALE Borjas         Patient information was obtained from patient, past medical records and ER records.     Subjective:     Principal Problem:Cellulitis    Chief Complaint:   Chief Complaint   Patient presents with    Rash     Pt has a rash on his leg, urgent care advised pt the rash was cellulitis.          HPI: Kehinde Soriano is a 47 year old male with a past medical history of DM, HTN, HLD, JANKI, and obesity who presents with one day of LLE redness, tenderness, and swelling associated with fever and malaise. He denies any trauma to this extremity. He states that he took clindamycin PO for one day after being seen in an urgent care which did not improve the LLE. Hospital Medicine was consulted for admission.      Past Medical History:   Diagnosis Date    Diabetes mellitus, type 2     Diverticulitis of colon     Hyperlipidemia     Hypertension     Sleep apnea        Past Surgical History:   Procedure Laterality Date    ABDOMINAL SURGERY      APPENDECTOMY      COLON SURGERY      HERNIA REPAIR         Review of patient's allergies indicates:  No Known Allergies    No current facility-administered medications on file prior to encounter.     Current Outpatient Medications on File Prior to Encounter   Medication Sig    atorvastatin (LIPITOR) 20 MG tablet Take 1 tablet (20 mg total) by mouth once daily.    blood sugar diagnostic Strp OneTouch Verio test strips   TEST BLOOD SUGAR BID AND PRN    cholecalciferol, vitamin D3, 1,250 mcg (50,000 unit) capsule cholecalciferol (vitamin D3) 1,250 mcg (50,000 unit) capsule   TAKE ONE CAPSULE BY MOUTH EVERY WEEK    fenofibrate 160 MG Tab Take 1 tablet (160 mg total) by mouth once daily.    glipiZIDE (GLUCOTROL) 10 MG  TR24 Take 1 tablet (10 mg total) by mouth daily with breakfast.    JARDIANCE 25 mg tablet TAKE 1 TABLET(25 MG) BY MOUTH EVERY DAY    lancets 33 gauge Misc OneTouch Delica Plus Lancet 33 gauge   USE TWICE DAILY AS DIRECTED AND AS NEEDED FOR BLOOD GLUCOSE TESTING    losartan (COZAAR) 50 MG tablet Take 50 mg by mouth once daily.    meloxicam (MOBIC) 15 MG tablet Take 1 tablet (15 mg total) by mouth once daily.    metFORMIN (GLUCOPHAGE-XR) 750 MG ER 24hr tablet Take 2 tablets (1,500 mg total) by mouth daily with breakfast.    MOUNJARO 2.5 mg/0.5 mL PnIj SMARTSI.5 Milligram(s) SUB-Q Once a Week    ondansetron (ZOFRAN-ODT) 4 MG TbDL Take 1 tablet (4 mg total) by mouth every 8 (eight) hours as needed (nausea).    ONETOUCH DELICA PLUS LANCET 33 gauge Misc Apply topically 2 (two) times daily as needed.    ONETOUCH VERIO TEST STRIPS Strp 2 (two) times daily as needed.    TRULICITY 0.75 mg/0.5 mL pen injector Inject into the skin.     Family History       Family history is unknown by patient.          Tobacco Use    Smoking status: Former     Packs/day: 0.50     Years: 10.00     Pack years: 5.00     Types: Cigarettes     Passive exposure: Current    Smokeless tobacco: Never   Substance and Sexual Activity    Alcohol use: No     Comment: ocassionally    Drug use: No    Sexual activity: Yes     Partners: Female     Review of Systems   Constitutional:  Positive for fatigue and fever.   Skin:  Positive for color change.   Allergic/Immunologic: Positive for immunocompromised state.   Objective:     Vital Signs (Most Recent):  Temp: 99.1 °F (37.3 °C) (23 154)  Pulse: (!) 117 (23 1547)  Resp: 19 (23 154)  BP: 136/85 (23 154)  SpO2: 95 % (23) Vital Signs (24h Range):  Temp:  [99.1 °F (37.3 °C)] 99.1 °F (37.3 °C)  Pulse:  [117] 117  Resp:  [19] 19  SpO2:  [95 %] 95 %  BP: (136)/(85) 136/85        There is no height or weight on file to calculate BMI.     Physical Exam  Vitals and  nursing note reviewed.   Constitutional:       General: He is not in acute distress.     Appearance: He is obese.   HENT:      Head: Normocephalic.      Right Ear: External ear normal.      Left Ear: External ear normal.      Nose: Nose normal.      Mouth/Throat:      Mouth: Mucous membranes are moist.      Pharynx: Oropharynx is clear.   Eyes:      Extraocular Movements: Extraocular movements intact.      Conjunctiva/sclera: Conjunctivae normal.   Cardiovascular:      Rate and Rhythm: Regular rhythm. Tachycardia present.      Pulses: Normal pulses.      Heart sounds: Normal heart sounds.   Pulmonary:      Effort: Pulmonary effort is normal.      Breath sounds: Normal breath sounds.   Abdominal:      General: Bowel sounds are normal.      Palpations: Abdomen is soft.   Musculoskeletal:         General: Swelling and tenderness present.      Cervical back: Normal range of motion and neck supple.      Left lower leg: Edema present.   Skin:     Findings: Erythema present.   Neurological:      Mental Status: He is alert. Mental status is at baseline.   Psychiatric:         Mood and Affect: Mood normal.         Behavior: Behavior normal.              Significant Labs: All pertinent labs within the past 24 hours have been reviewed.    Significant Imaging: I have reviewed all pertinent imaging results/findings within the past 24 hours.    Assessment/Plan:     * Cellulitis  Likely precipitated by toenail fungal infection.  -Vancomycin  -Cefazolin  -Blood cultures  -MRSA culture  -PRN analgesics    Severe obesity  There is no height or weight on file to calculate BMI. Morbid obesity complicates all aspects of disease management from diagnostic modalities to treatment.        Hyperlipidemia  -Fibrate  -Statin      Hypertension  -Losartan 50  -Continue to monitor      Diabetes  Patient's FSGs are controlled on current medication regimen.  Last A1c reviewed-   Lab Results   Component Value Date    HGBA1C 9.3 (H) 08/18/2022      Most recent fingerstick glucose reviewed- No results for input(s): POCTGLUCOSE in the last 24 hours.  Current correctional scale  Medium  Maintain anti-hyperglycemic dose as follows-   Antihyperglycemics (From admission, onward)    Start     Stop Route Frequency Ordered    06/11/23 1836  insulin aspart U-100 pen 1-10 Units         -- SubQ Before meals & nightly PRN 06/11/23 1738        Hold Oral hypoglycemics while patient is in the hospital.    JANKI (obstructive sleep apnea)  -CPAP QHS        VTE Risk Mitigation (From admission, onward)         Ordered     enoxaparin injection 40 mg  Daily         06/11/23 1738     IP VTE HIGH RISK PATIENT  Once         06/11/23 1738     Place sequential compression device  Until discontinued         06/11/23 1738                   On 06/11/2023, patient should be placed in hospital observation services under my care.        Kishore Brown MD  Department of Hospital Medicine  [facility] - Emergency Dept

## 2023-06-11 NOTE — ED PROVIDER NOTES
Encounter Date: 6/11/2023    SCRIBE #1 NOTE: I, Cindytorsten Schaeffer, am scribing for, and in the presence of,  Mehul Pearson MD.     History     Chief Complaint   Patient presents with    Rash     Pt has a rash on his leg, urgent care advised pt the rash was cellulitis.       Time seen by provider: 5:24 PM on 06/11/2023    Kehinde Soriano is a 47 y.o. male who presents to the ED with an onset of a rash to his left lower leg and ankle that began 2 days ago. It is bright red and painful. Patient states he had a fever yesterday of 102.0°F and has not taken any meds today other than cleocin. Patient was seen by urgent care yesterday and prescribed Clindamycin. He has taken the antibiotic with no relief and his rash is continuing to worsen. The patient denies vomiting, diarrhea or any other symptoms at this time. He has a PMHx of diverticulitis, diabetes, HTN, and HLD. He has a PSHx of appendectomy. Patient drinks alcohol occasionally but does not smoke.     The history is provided by the patient.   Review of patient's allergies indicates:  No Known Allergies  Past Medical History:   Diagnosis Date    Diabetes mellitus, type 2     Diverticulitis of colon     Hyperlipidemia     Hypertension     Sleep apnea      Past Surgical History:   Procedure Laterality Date    ABDOMINAL SURGERY      APPENDECTOMY      COLON SURGERY      HERNIA REPAIR       Family History   Family history unknown: Yes     Social History     Tobacco Use    Smoking status: Former     Packs/day: 0.50     Years: 10.00     Pack years: 5.00     Types: Cigarettes     Passive exposure: Current    Smokeless tobacco: Never   Substance Use Topics    Alcohol use: No     Comment: ocassionally    Drug use: No     Review of Systems   Constitutional:  Positive for fever.   Gastrointestinal:  Negative for diarrhea and vomiting.   Skin:  Positive for rash.   All other systems reviewed and are negative.    Physical Exam     Initial Vitals [06/11/23 1547]   BP Pulse  Resp Temp SpO2   136/85 (!) 117 19 99.1 °F (37.3 °C) 95 %      MAP       --         Physical Exam    Nursing note and vitals reviewed.  Constitutional: He appears well-developed and well-nourished. He is not diaphoretic. He is Obese .  Non-toxic appearance. He does not have a sickly appearance. He does not appear ill. No distress.   HENT:   Head: Normocephalic and atraumatic.   Eyes: EOM are normal.   Neck: Neck supple.   Normal range of motion.  Cardiovascular:  Normal rate, regular rhythm and normal heart sounds.     Exam reveals no gallop and no friction rub.       No murmur heard.  Pulmonary/Chest: Breath sounds normal. No respiratory distress. He has no wheezes. He has no rhonchi. He has no rales.   Musculoskeletal:         General: Normal range of motion.      Cervical back: Normal range of motion and neck supple. No rigidity. Normal range of motion.     Neurological: He is alert and oriented to person, place, and time.   Skin: Skin is warm and dry. Rash noted. There is erythema.   Circumferential erythema and calor to the left lower leg from the proximal ankle to the proximal tibia.   Psychiatric: He has a normal mood and affect. His behavior is normal. Judgment and thought content normal.       ED Course   Procedures  Labs Reviewed   CBC W/ AUTO DIFFERENTIAL - Abnormal; Notable for the following components:       Result Value    RBC 4.36 (*)     Hemoglobin 13.8 (*)     MCH 31.7 (*)     Immature Granulocytes 3.1 (*)     Immature Grans (Abs) 0.23 (*)     Lymph # 0.9 (*)     Lymph % 12.7 (*)     All other components within normal limits   BASIC METABOLIC PANEL - Abnormal; Notable for the following components:    Sodium 135 (*)     CO2 21 (*)     Glucose 323 (*)     All other components within normal limits   CULTURE, BLOOD   CULTURE, BLOOD   CULTURE, METHICILLIN-RESISTANT STAPHYLOCOCCUS AUREUS          Imaging Results              US Abdomen Limited (In process)                      Medications   atorvastatin  tablet 20 mg (has no administration in time range)   fenofibrate tablet 160 mg (has no administration in time range)   losartan tablet 50 mg (has no administration in time range)   sodium chloride 0.9% flush 10 mL (has no administration in time range)   acetaminophen tablet 650 mg (has no administration in time range)   naloxone 0.4 mg/mL injection 0.02 mg (has no administration in time range)   glucose chewable tablet 16 g (has no administration in time range)   glucose chewable tablet 24 g (has no administration in time range)   glucagon (human recombinant) injection 1 mg (has no administration in time range)   enoxaparin injection 40 mg (has no administration in time range)   ondansetron injection 4 mg (has no administration in time range)   insulin aspart U-100 pen 1-10 Units (has no administration in time range)   vancomycin - pharmacy to dose (has no administration in time range)   cefazolin (ANCEF) 2 gram in dextrose 5% 50 mL IVPB (premix) (has no administration in time range)   dextrose 10% bolus 125 mL 125 mL (has no administration in time range)   dextrose 10% bolus 250 mL 250 mL (has no administration in time range)   traMADoL tablet 50 mg (has no administration in time range)   vancomycin (VANCOCIN) 2,000 mg in dextrose 5 % (D5W) 500 mL IVPB (2,000 mg Intravenous Trough Due As Scheduled Before Dose 6/12/23 1800)     Medical Decision Making:   History:   Old Medical Records: I decided to obtain old medical records.  Independently Interpreted Test(s):   I have ordered and independently interpreted EKG Reading(s) - see prior notes  Clinical Tests:   Lab Tests: Ordered and Reviewed  Medical Tests: Ordered and Reviewed  Other:   I have discussed this case with another health care provider.       <> Summary of the Discussion: Dr sandhu to admit, he will order bcx and abx, does not want lactic acid  47-year-old male multiple medical problems presents left lower extremity calor and erythema consistent with  cellulitis.  He is currently on Cleocin and his condition is worsening so he needs to be admitted.        Scribe Attestation:   Scribe #1: I performed the above scribed service and the documentation accurately describes the services I performed. I attest to the accuracy of the note.      ED Course as of 06/11/23 1857   Sun Jun 11, 2023   1719 BP: 136/85 [EF]   1719 Temp: 99.1 °F (37.3 °C) [EF]   1719 Temp Source: Oral [EF]   1719 Resp: 19 [EF]   1719 SpO2: 95 % [EF]   1728 Dr. Brown to admit for cellulitis, he will order abx and bcx, does not want lactic acid [EF]          ED Course User Index  [EF] Mehul Pearson MD               I, Dr. Pearson, personally performed the services described in this documentation. All medical record entries made by the scribe were at my direction and in my presence.  I have reviewed the chart and agree that the record reflects my personal performance and is accurate and complete.6:57 PM 06/11/2023    Clinical Impression:   Final diagnoses:  [L03.90] Cellulitis        ED Disposition Condition    Observation Stable                Mehul Pearson MD  06/11/23 0998

## 2023-06-11 NOTE — SUBJECTIVE & OBJECTIVE
Past Medical History:   Diagnosis Date    Diabetes mellitus, type 2     Diverticulitis of colon     Hyperlipidemia     Hypertension     Sleep apnea        Past Surgical History:   Procedure Laterality Date    ABDOMINAL SURGERY      APPENDECTOMY      COLON SURGERY      HERNIA REPAIR         Review of patient's allergies indicates:  No Known Allergies    No current facility-administered medications on file prior to encounter.     Current Outpatient Medications on File Prior to Encounter   Medication Sig    atorvastatin (LIPITOR) 20 MG tablet Take 1 tablet (20 mg total) by mouth once daily.    blood sugar diagnostic Strp OneTouch Verio test strips   TEST BLOOD SUGAR BID AND PRN    cholecalciferol, vitamin D3, 1,250 mcg (50,000 unit) capsule cholecalciferol (vitamin D3) 1,250 mcg (50,000 unit) capsule   TAKE ONE CAPSULE BY MOUTH EVERY WEEK    fenofibrate 160 MG Tab Take 1 tablet (160 mg total) by mouth once daily.    glipiZIDE (GLUCOTROL) 10 MG TR24 Take 1 tablet (10 mg total) by mouth daily with breakfast.    JARDIANCE 25 mg tablet TAKE 1 TABLET(25 MG) BY MOUTH EVERY DAY    lancets 33 gauge Misc OneTouch Delica Plus Lancet 33 gauge   USE TWICE DAILY AS DIRECTED AND AS NEEDED FOR BLOOD GLUCOSE TESTING    losartan (COZAAR) 50 MG tablet Take 50 mg by mouth once daily.    meloxicam (MOBIC) 15 MG tablet Take 1 tablet (15 mg total) by mouth once daily.    metFORMIN (GLUCOPHAGE-XR) 750 MG ER 24hr tablet Take 2 tablets (1,500 mg total) by mouth daily with breakfast.    MOUNJARO 2.5 mg/0.5 mL PnIj SMARTSI.5 Milligram(s) SUB-Q Once a Week    ondansetron (ZOFRAN-ODT) 4 MG TbDL Take 1 tablet (4 mg total) by mouth every 8 (eight) hours as needed (nausea).    ONETOUCH DELICA PLUS LANCET 33 gauge Misc Apply topically 2 (two) times daily as needed.    ONETOUCH VERIO TEST STRIPS Strp 2 (two) times daily as needed.    TRULICITY 0.75 mg/0.5 mL pen injector Inject into the skin.     Family History       Family history is unknown by  patient.          Tobacco Use    Smoking status: Former     Packs/day: 0.50     Years: 10.00     Pack years: 5.00     Types: Cigarettes     Passive exposure: Current    Smokeless tobacco: Never   Substance and Sexual Activity    Alcohol use: No     Comment: ocassionally    Drug use: No    Sexual activity: Yes     Partners: Female     Review of Systems   Constitutional:  Positive for fatigue and fever.   Skin:  Positive for color change.   Allergic/Immunologic: Positive for immunocompromised state.   Objective:     Vital Signs (Most Recent):  Temp: 99.1 °F (37.3 °C) (06/11/23 1547)  Pulse: (!) 117 (06/11/23 1547)  Resp: 19 (06/11/23 1547)  BP: 136/85 (06/11/23 1547)  SpO2: 95 % (06/11/23 1547) Vital Signs (24h Range):  Temp:  [99.1 °F (37.3 °C)] 99.1 °F (37.3 °C)  Pulse:  [117] 117  Resp:  [19] 19  SpO2:  [95 %] 95 %  BP: (136)/(85) 136/85        There is no height or weight on file to calculate BMI.     Physical Exam  Vitals and nursing note reviewed.   Constitutional:       General: He is not in acute distress.     Appearance: He is obese.   HENT:      Head: Normocephalic.      Right Ear: External ear normal.      Left Ear: External ear normal.      Nose: Nose normal.      Mouth/Throat:      Mouth: Mucous membranes are moist.      Pharynx: Oropharynx is clear.   Eyes:      Extraocular Movements: Extraocular movements intact.      Conjunctiva/sclera: Conjunctivae normal.   Cardiovascular:      Rate and Rhythm: Regular rhythm. Tachycardia present.      Pulses: Normal pulses.      Heart sounds: Normal heart sounds.   Pulmonary:      Effort: Pulmonary effort is normal.      Breath sounds: Normal breath sounds.   Abdominal:      General: Bowel sounds are normal.      Palpations: Abdomen is soft.   Musculoskeletal:         General: Swelling and tenderness present.      Cervical back: Normal range of motion and neck supple.      Left lower leg: Edema present.   Skin:     Findings: Erythema present.   Neurological:       Mental Status: He is alert. Mental status is at baseline.   Psychiatric:         Mood and Affect: Mood normal.         Behavior: Behavior normal.              Significant Labs: All pertinent labs within the past 24 hours have been reviewed.    Significant Imaging: I have reviewed all pertinent imaging results/findings within the past 24 hours.

## 2023-06-11 NOTE — PROGRESS NOTES
Pharmacokinetic Initial Assessment: IV Vancomycin    Assessment/Plan:    Initiate intravenous vancomycin with loading dose of 2000 mg once followed by a maintenance dose of vancomycin 2000 mg IV every 12 hours  Desired empiric serum trough concentration is 15 to 20 mcg/mL  Draw vancomycin trough level 60 min prior to third dose on 6/12/23 at approximately 1800  Pharmacy will continue to follow and monitor vancomycin.      Please contact pharmacy at extension [phone] with any questions regarding this assessment.     Thank you for the consult,   Lina Soriano       Patient brief summary:  Kehinde Soriano is a 47 y.o. male initiated on antimicrobial therapy with IV Vancomycin for treatment of suspected skin & soft tissue infection    Drug Allergies:   Review of patient's allergies indicates:  No Known Allergies    Actual Body Weight:   158.8 kg    Renal Function:   Estimated Creatinine Clearance: 133.8 mL/min (based on SCr of 1 mg/dL).,       CBC (last 72 hours):  Recent Labs   Lab Result Units 06/11/23  1701   WBC K/uL 7.41   Hemoglobin g/dL 13.8*   Hematocrit % 40.4   Platelets K/uL 172   Gran % % 68.4   Lymph % % 12.7*   Mono % % 12.4   Eosinophil % % 2.3   Basophil % % 1.1   Differential Method  Automated       Metabolic Panel (last 72 hours):  Recent Labs   Lab Result Units 06/11/23  1701   Sodium mmol/L 135*   Potassium mmol/L 4.3   Chloride mmol/L 100   CO2 mmol/L 21*   Glucose mg/dL 323*   BUN mg/dL 17   Creatinine mg/dL 1.0       Drug levels (last 3 results):  No results for input(s): VANCOMYCINRA, VANCORANDOM, VANCOMYCINPE, VANCOPEAK, VANCOMYCINTR, VANCOTROUGH in the last 72 hours.    Microbiologic Results:  Microbiology Results (last 7 days)       Procedure Component Value Units Date/Time    Blood culture (site 1) [[id number]] Collected: 06/11/23 1752    Order Status: Sent Specimen: Blood from Antecubital, Left Arm Updated: 06/11/23 1756    Blood culture (site 2) [[id number]] Collected: 06/11/23 1755    Order  Status: Sent Specimen: Blood from Antecubital, Right Arm Updated: 06/11/23 1756    Culture, MRSA [[id number]]     Order Status: No result Specimen: MRSA source

## 2023-06-11 NOTE — ASSESSMENT & PLAN NOTE
Patient Education        Child's Well Visit, 5 Years: Care Instructions  Your Care Instructions    Your child may like to play with friends more than doing things with you. He or she may like to tell stories and is interested in relationships between people. Most 11year-olds know the names of things in the house, such as appliances, and what they are used for. Your child may dress himself or herself without help and probably likes to play make-believe. Your child can now learn his or her address and phone number. He or she is likely to copy shapes like triangles and squares and count on fingers. Follow-up care is a key part of your child's treatment and safety. Be sure to make and go to all appointments, and call your doctor if your child is having problems. It's also a good idea to know your child's test results and keep a list of the medicines your child takes. How can you care for your child at home? Eating and a healthy weight  · Encourage healthy eating habits. Most children do well with three meals and two or three snacks a day. Start with small, easy-to-achieve changes, such as offering more fruits and vegetables at meals and snacks. Give him or her nonfat and low-fat dairy foods and whole grains, such as rice, pasta, or whole wheat bread, at every meal.  · Let your child decide how much he or she wants to eat. Give your child foods he or she likes but also give new foods to try. If your child is not hungry at one meal, it is okay for him or her to wait until the next meal or snack to eat. · Check in with your child's school or day care to make sure that healthy meals and snacks are given. · Do not eat much fast food. Choose healthy snacks that are low in sugar, fat, and salt instead of candy, chips, and other junk foods. · Offer water when your child is thirsty. Do not give your child juice drinks more than once a day. Juice does not have the valuable fiber that whole fruit has.  Do not give your There is no height or weight on file to calculate BMI. Morbid obesity complicates all aspects of disease management from diagnostic modalities to treatment.       child soda pop. · Make meals a family time. Have nice conversations at mealtime and turn the TV off. · Do not use food as a reward or punishment for your child's behavior. Do not make your children \"clean their plates. \"  · Let all your children know that you love them whatever their size. Help your child feel good about himself or herself. Remind your child that people come in different shapes and sizes. Do not tease or nag your child about his or her weight, and do not say your child is skinny, fat, or chubby. · Limit TV or video time to 1 hour a day. Research shows that the more TV a child watches, the higher the chance that he or she will be overweight. Do not put a TV in your child's bedroom, and do not use TV and videos as a . Healthy habits  · Have your child play actively for at least 30 to 60 minutes every day. Plan family activities, such as trips to the park, walks, bike rides, swimming, and gardening. · Help your child brush his or her teeth 2 times a day and floss one time a day. Take your child to the dentist 2 times a year. · Do not let your child watch more than 1 hour of TV or video a day. Check for TV programs that are good for 11year olds. · Put a broad-spectrum sunscreen (SPF 30 or higher) on your child before he or she goes outside. Use a broad-brimmed hat to shade his or her ears, nose, and lips. · Do not smoke or allow others to smoke around your child. Smoking around your child increases the child's risk for ear infections, asthma, colds, and pneumonia. If you need help quitting, talk to your doctor about stop-smoking programs and medicines. These can increase your chances of quitting for good. · Put your child to bed at a regular time, so he or she gets enough sleep. Safety  · Use a belt-positioning booster seat in the car if your child weighs more than 40 pounds. Be sure the car's lap and shoulder belt are positioned across the child in the back seat.  Know your state's laws for child safety seats. · Make sure your child wears a helmet that fits properly when he or she rides a bike or scooter. · Keep cleaning products and medicines in locked cabinets out of your child's reach. Keep the number for Poison Control (3-589.572.4098) in or near your phone. · Put locks or guards on all windows above the first floor. Watch your child at all times near play equipment and stairs. · Watch your child at all times when he or she is near water, including pools, hot tubs, and bathtubs. Knowing how to swim does not make your child safe from drowning. · Do not let your child play in or near the street. Children younger than age 6 should not cross the street alone. Immunizations  Flu immunization is recommended once a year for all children ages 7 months and older. Ask your doctor if your child needs any other last doses of vaccines, such as MMR and chickenpox. Parenting  · Read stories to your child every day. One way children learn to read is by hearing the same story over and over. · Play games, talk, and sing to your child every day. Give your child love and attention. · Give your child simple chores to do. Children usually like to help. · Teach your child your home address, phone number, and how to call 911. · Teach your child not to let anyone touch his or her private parts. · Teach your child not to take anything from strangers and not to go with strangers. · Praise good behavior. Do not yell or spank. Use time-out instead. Be fair with your rules and use them in the same way every time. Your child learns from watching and listening to you. Getting ready for   Most children start  between 3 and 10years old. It can be hard to know when your child is ready for school. Your local elementary school or  can help.  Most children are ready for  if they can do these things:  · Your child can keep hands to himself or herself while in line; sit and pay attention for at least 5 minutes; sit quietly while listening to a story; help with clean-up activities, such as putting away toys; use words for frustration rather than acting out; work and play with other children in small groups; do what the teacher asks; get dressed; and use the bathroom without help. · Your child can stand and hop on one foot; throw and catch balls; hold a pencil correctly; cut with scissors; and copy or trace a line and [place]. · Your child can spell and write his or her first name; do two-step directions, like \"do this and then do that\"; talk with other children and adults; sing songs with a group; count from 1 to 5; see the difference between two objects, such as one is large and one is small; and understand what \"first\" and \"last\" mean. When should you call for help? Watch closely for changes in your child's health, and be sure to contact your doctor if:    · You are concerned that your child is not growing or developing normally.     · You are worried about your child's behavior.     · You need more information about how to care for your child, or you have questions or concerns. Where can you learn more? Go to https://Cause.itpeStykyeb.Fanatics. org and sign in to your [employer] account. Enter 176 [id number] in the Sn[employer] box to learn more about \"Child's Well Visit, 5 Years: Care Instructions. \"     If you do not have an account, please click on the \"Sign Up Now\" link. Current as of: March 27, 2018  Content Version: 11.9  © 8425-6881 [employer]. Care instructions adapted under license by [facility] ([facility]). If you have questions about a medical condition or this instruction, always ask your healthcare professional. [place] [address] any warranty or liability for your use of this information.

## 2023-06-11 NOTE — ASSESSMENT & PLAN NOTE
Likely precipitated by toenail fungal infection.  -Vancomycin  -Cefazolin  -Blood cultures  -MRSA culture  -PRN analgesics

## 2023-06-12 LAB
ALBUMIN SERPL BCP-MCNC: 3.7 G/DL (ref 3.5–5.2)
ALP SERPL-CCNC: 62 U/L (ref 55–135)
ALT SERPL W/O P-5'-P-CCNC: 56 U/L (ref 10–44)
ANION GAP SERPL CALC-SCNC: 13 MMOL/L (ref 8–16)
AST SERPL-CCNC: 35 U/L (ref 10–40)
BASOPHILS # BLD AUTO: 0.11 K/UL (ref 0–0.2)
BASOPHILS NFR BLD: 1.4 % (ref 0–1.9)
BILIRUB SERPL-MCNC: 0.7 MG/DL (ref 0.1–1)
BUN SERPL-MCNC: 15 MG/DL (ref 6–20)
CALCIUM SERPL-MCNC: 9.1 MG/DL (ref 8.7–10.5)
CHLORIDE SERPL-SCNC: 101 MMOL/L (ref 95–110)
CO2 SERPL-SCNC: 22 MMOL/L (ref 23–29)
CREAT SERPL-MCNC: 1 MG/DL (ref 0.5–1.4)
DIFFERENTIAL METHOD: ABNORMAL
EOSINOPHIL # BLD AUTO: 0.3 K/UL (ref 0–0.5)
EOSINOPHIL NFR BLD: 3.1 % (ref 0–8)
ERYTHROCYTE [DISTWIDTH] IN BLOOD BY AUTOMATED COUNT: 13.4 % (ref 11.5–14.5)
EST. GFR  (NO RACE VARIABLE): >60 ML/MIN/1.73 M^2
GLUCOSE SERPL-MCNC: 178 MG/DL (ref 70–110)
HCT VFR BLD AUTO: 41.4 % (ref 40–54)
HGB BLD-MCNC: 13.7 G/DL (ref 14–18)
IMM GRANULOCYTES # BLD AUTO: 0.4 K/UL (ref 0–0.04)
IMM GRANULOCYTES NFR BLD AUTO: 5 % (ref 0–0.5)
LYMPHOCYTES # BLD AUTO: 1.2 K/UL (ref 1–4.8)
LYMPHOCYTES NFR BLD: 14.6 % (ref 18–48)
MAGNESIUM SERPL-MCNC: 2.1 MG/DL (ref 1.6–2.6)
MCH RBC QN AUTO: 31.3 PG (ref 27–31)
MCHC RBC AUTO-ENTMCNC: 33.1 G/DL (ref 32–36)
MCV RBC AUTO: 95 FL (ref 82–98)
MONOCYTES # BLD AUTO: 1.2 K/UL (ref 0.3–1)
MONOCYTES NFR BLD: 14.8 % (ref 4–15)
NEUTROPHILS # BLD AUTO: 4.9 K/UL (ref 1.8–7.7)
NEUTROPHILS NFR BLD: 61.1 % (ref 38–73)
NRBC BLD-RTO: 0 /100 WBC
PHOSPHATE SERPL-MCNC: 3.8 MG/DL (ref 2.7–4.5)
PLATELET # BLD AUTO: 170 K/UL (ref 150–450)
PMV BLD AUTO: 10.8 FL (ref 9.2–12.9)
POCT GLUCOSE: 147 MG/DL (ref 70–110)
POCT GLUCOSE: 156 MG/DL (ref 70–110)
POCT GLUCOSE: 191 MG/DL (ref 70–110)
POCT GLUCOSE: 239 MG/DL (ref 70–110)
POTASSIUM SERPL-SCNC: 4.2 MMOL/L (ref 3.5–5.1)
PROT SERPL-MCNC: 7.3 G/DL (ref 6–8.4)
RBC # BLD AUTO: 4.38 M/UL (ref 4.6–6.2)
SODIUM SERPL-SCNC: 136 MMOL/L (ref 136–145)
VANCOMYCIN TROUGH SERPL-MCNC: 9.9 UG/ML (ref 10–22)
WBC # BLD AUTO: 7.96 K/UL (ref 3.9–12.7)

## 2023-06-12 PROCEDURE — 25000242 PHARM REV CODE 250 ALT 637 W/ HCPCS: Performed by: STUDENT IN AN ORGANIZED HEALTH CARE EDUCATION/TRAINING PROGRAM

## 2023-06-12 PROCEDURE — 11000001 HC ACUTE MED/SURG PRIVATE ROOM

## 2023-06-12 PROCEDURE — 80202 ASSAY OF VANCOMYCIN: CPT | Performed by: STUDENT IN AN ORGANIZED HEALTH CARE EDUCATION/TRAINING PROGRAM

## 2023-06-12 PROCEDURE — 84100 ASSAY OF PHOSPHORUS: CPT | Performed by: STUDENT IN AN ORGANIZED HEALTH CARE EDUCATION/TRAINING PROGRAM

## 2023-06-12 PROCEDURE — 85025 COMPLETE CBC W/AUTO DIFF WBC: CPT | Performed by: STUDENT IN AN ORGANIZED HEALTH CARE EDUCATION/TRAINING PROGRAM

## 2023-06-12 PROCEDURE — 25000003 PHARM REV CODE 250: Performed by: STUDENT IN AN ORGANIZED HEALTH CARE EDUCATION/TRAINING PROGRAM

## 2023-06-12 PROCEDURE — 96366 THER/PROPH/DIAG IV INF ADDON: CPT

## 2023-06-12 PROCEDURE — 94761 N-INVAS EAR/PLS OXIMETRY MLT: CPT

## 2023-06-12 PROCEDURE — 63600175 PHARM REV CODE 636 W HCPCS: Performed by: STUDENT IN AN ORGANIZED HEALTH CARE EDUCATION/TRAINING PROGRAM

## 2023-06-12 PROCEDURE — 87081 CULTURE SCREEN ONLY: CPT | Performed by: STUDENT IN AN ORGANIZED HEALTH CARE EDUCATION/TRAINING PROGRAM

## 2023-06-12 PROCEDURE — 80053 COMPREHEN METABOLIC PANEL: CPT | Performed by: STUDENT IN AN ORGANIZED HEALTH CARE EDUCATION/TRAINING PROGRAM

## 2023-06-12 PROCEDURE — 36415 COLL VENOUS BLD VENIPUNCTURE: CPT | Performed by: STUDENT IN AN ORGANIZED HEALTH CARE EDUCATION/TRAINING PROGRAM

## 2023-06-12 PROCEDURE — 83735 ASSAY OF MAGNESIUM: CPT | Performed by: STUDENT IN AN ORGANIZED HEALTH CARE EDUCATION/TRAINING PROGRAM

## 2023-06-12 RX ORDER — KETOROLAC TROMETHAMINE 30 MG/ML
15 INJECTION, SOLUTION INTRAMUSCULAR; INTRAVENOUS EVERY 6 HOURS PRN
Status: DISCONTINUED | OUTPATIENT
Start: 2023-06-12 | End: 2023-06-13 | Stop reason: HOSPADM

## 2023-06-12 RX ORDER — METFORMIN HYDROCHLORIDE 750 MG/1
TABLET, EXTENDED RELEASE ORAL
Qty: 180 TABLET | Refills: 0 | Status: SHIPPED | OUTPATIENT
Start: 2023-06-12 | End: 2023-09-21 | Stop reason: SDUPTHER

## 2023-06-12 RX ORDER — GLIPIZIDE 10 MG/1
TABLET, FILM COATED, EXTENDED RELEASE ORAL
Qty: 90 TABLET | Refills: 0 | Status: SHIPPED | OUTPATIENT
Start: 2023-06-12 | End: 2023-09-21 | Stop reason: SDUPTHER

## 2023-06-12 RX ORDER — FENOFIBRATE 160 MG/1
TABLET ORAL
Qty: 90 TABLET | Refills: 1 | Status: SHIPPED | OUTPATIENT
Start: 2023-06-12 | End: 2023-09-21 | Stop reason: SDUPTHER

## 2023-06-12 RX ADMIN — TRAMADOL HYDROCHLORIDE 50 MG: 50 TABLET, COATED ORAL at 12:06

## 2023-06-12 RX ADMIN — VANCOMYCIN HYDROCHLORIDE 2000 MG: 1 INJECTION, POWDER, LYOPHILIZED, FOR SOLUTION INTRAVENOUS at 08:06

## 2023-06-12 RX ADMIN — INSULIN ASPART 2 UNITS: 100 INJECTION, SOLUTION INTRAVENOUS; SUBCUTANEOUS at 11:06

## 2023-06-12 RX ADMIN — INSULIN ASPART 2 UNITS: 100 INJECTION, SOLUTION INTRAVENOUS; SUBCUTANEOUS at 07:06

## 2023-06-12 RX ADMIN — ENOXAPARIN SODIUM 40 MG: 40 INJECTION SUBCUTANEOUS at 04:06

## 2023-06-12 RX ADMIN — FENOFIBRATE 160 MG: 160 TABLET, FILM COATED ORAL at 08:06

## 2023-06-12 RX ADMIN — TRAMADOL HYDROCHLORIDE 50 MG: 50 TABLET, COATED ORAL at 08:06

## 2023-06-12 RX ADMIN — KETOROLAC TROMETHAMINE 15 MG: 30 INJECTION, SOLUTION INTRAMUSCULAR; INTRAVENOUS at 04:06

## 2023-06-12 RX ADMIN — CEFAZOLIN SODIUM 2 G: 2 SOLUTION INTRAVENOUS at 11:06

## 2023-06-12 RX ADMIN — INSULIN ASPART 2 UNITS: 100 INJECTION, SOLUTION INTRAVENOUS; SUBCUTANEOUS at 08:06

## 2023-06-12 RX ADMIN — ATORVASTATIN CALCIUM 20 MG: 20 TABLET, FILM COATED ORAL at 08:06

## 2023-06-12 RX ADMIN — CEFAZOLIN SODIUM 2 G: 2 SOLUTION INTRAVENOUS at 06:06

## 2023-06-12 RX ADMIN — CEFAZOLIN SODIUM 2 G: 2 SOLUTION INTRAVENOUS at 02:06

## 2023-06-12 RX ADMIN — LOSARTAN POTASSIUM 50 MG: 25 TABLET, FILM COATED ORAL at 08:06

## 2023-06-12 NOTE — CARE UPDATE
06/12/23 0750   Patient Assessment/Suction   Level of Consciousness (AVPU) alert   Respiratory Effort Unlabored   Expansion/Accessory Muscles/Retractions no retractions;no use of accessory muscles;expansion symmetric   Rhythm/Pattern, Respiratory unlabored;pattern regular;depth regular;no shortness of breath reported   PRE-TX-O2   Device (Oxygen Therapy) room air;CPAP  (home cpap)   SpO2 95 %   Pulse Oximetry Type Intermittent   $ Pulse Oximetry - Multiple Charge Pulse Oximetry - Multiple   Pulse 74   Resp 16   Preset CPAP/BiPAP Settings   Mode Of Delivery CPAP  (home unit)

## 2023-06-12 NOTE — PROGRESS NOTES
"[facility] Hospital Medicine  Progress Note    Patient Name: Kehinde Soriano  MRN: [id number]  Patient Class: OP- Observation   Admission Date: 6/11/2023  Length of Stay: 0 days  Attending Physician: Kishore Brown MD  Primary Care Provider: GALE Borjas        Subjective:     Principal Problem:Cellulitis        HPI:  Kehinde Soriano is a 47 year old male with a past medical history of DM, HTN, HLD, JANKI, and obesity who presents with one day of LLE redness, tenderness, and swelling associated with fever and malaise. He denies any trauma to this extremity. He states that he took clindamycin PO for one day after being seen in an urgent care which did not improve the LLE. Hospital Medicine was consulted for admission.      Overview/Hospital Course:  Kehinde Soriano is a 47 year old male with a past medical history of DM, HTN, HLD, JANKI, and obesity who presented with LLE cellulitis. Blood cultures are negative and MRSA swab is pending. He is currently on vancomycin and cefazolin.      Interval History: see "Hospital Course"    Review of Systems   Skin:  Positive for color change.   Allergic/Immunologic: Positive for immunocompromised state.   Objective:     Vital Signs (Most Recent):  Temp: 97.6 °F (36.4 °C) (06/12/23 0336)  Pulse: 77 (06/12/23 0336)  Resp: 18 (06/12/23 0336)  BP: 125/76 (06/12/23 0336)  SpO2: 96 % (06/12/23 0336) Vital Signs (24h Range):  Temp:  [97.6 °F (36.4 °C)-99.6 °F (37.6 °C)] 97.6 °F (36.4 °C)  Pulse:  [] 77  Resp:  [16-20] 18  SpO2:  [94 %-96 %] 96 %  BP: (122-165)/(70-89) 125/76     Weight: (!) 157.9 kg (348 lb 1.7 oz)  Body mass index is 51.41 kg/m².    Intake/Output Summary (Last 24 hours) at 6/12/2023 5735  Last data filed at 6/12/2023 0400  Gross per 24 hour   Intake 950 ml   Output --   Net 950 ml         Physical Exam  Vitals and nursing note reviewed.   Constitutional:       General: He is not in acute distress.     Appearance: He is obese.   HENT: "      Head: Normocephalic.      Right Ear: External ear normal.      Left Ear: External ear normal.      Nose: Nose normal.      Mouth/Throat:      Mouth: Mucous membranes are moist.      Pharynx: Oropharynx is clear.   Eyes:      Extraocular Movements: Extraocular movements intact.      Conjunctiva/sclera: Conjunctivae normal.   Cardiovascular:      Rate and Rhythm: Normal rate and regular rhythm.      Pulses: Normal pulses.      Heart sounds: Normal heart sounds.   Pulmonary:      Effort: Pulmonary effort is normal.      Breath sounds: Normal breath sounds.   Abdominal:      General: Bowel sounds are normal.      Palpations: Abdomen is soft.   Musculoskeletal:         General: Swelling and tenderness present.      Cervical back: Normal range of motion and neck supple.      Left lower leg: Edema present.   Skin:     Findings: Erythema present.   Neurological:      Mental Status: He is alert. Mental status is at baseline.   Psychiatric:         Mood and Affect: Mood normal.         Behavior: Behavior normal.           Significant Labs: All pertinent labs within the past 24 hours have been reviewed.    Significant Imaging: I have reviewed all pertinent imaging results/findings within the past 24 hours.      Assessment/Plan:      * Cellulitis  Likely precipitated by toenail fungal infection.  -Vancomycin  -Cefazolin  -Blood cultures  -MRSA culture  -PRN analgesics  -May need Podiatry referral upon discharge    Severe obesity  Body mass index is 51.41 kg/m². Morbid obesity complicates all aspects of disease management from diagnostic modalities to treatment.        Hyperlipidemia  -Fibrate  -Statin      Hypertension  -Losartan 50  -Continue to monitor      Diabetes  Patient's FSGs are controlled on current medication regimen.  Last A1c reviewed-   Lab Results   Component Value Date    HGBA1C 9.3 (H) 08/18/2022     Most recent fingerstick glucose reviewed-   Recent Labs   Lab 06/11/23 2021 06/11/23 2118 06/12/23  0718    POCTGLUCOSE 193* 169* 156*     Current correctional scale  Medium  Maintain anti-hyperglycemic dose as follows-   Antihyperglycemics (From admission, onward)    Start     Stop Route Frequency Ordered    06/11/23 1836  insulin aspart U-100 pen 1-10 Units         -- SubQ Before meals & nightly PRN 06/11/23 1738        Hold Oral hypoglycemics while patient is in the hospital.    JANKI (obstructive sleep apnea)  -CPAP QHS        VTE Risk Mitigation (From admission, onward)         Ordered     enoxaparin injection 40 mg  Daily         06/11/23 1738     IP VTE HIGH RISK PATIENT  Once         06/11/23 1738     Place sequential compression device  Until discontinued         06/11/23 1738                Discharge Planning   EFFIE: 6/13/2023    Code Status: Full Code   Is the patient medically ready for discharge?:     Reason for patient still in hospital (select all that apply): Patient trending condition, Laboratory test and Treatment                     Kishore Brown MD  Department of Hospital Medicine   [facility]

## 2023-06-12 NOTE — PLAN OF CARE
Problem: Adult Inpatient Plan of Care  Goal: Plan of Care Review  6/12/2023 0005 by Isadora Dawn RN  Outcome: Ongoing, Progressing  6/12/2023 0005 by Isadora Dawn RN  Outcome: Ongoing, Progressing  Goal: Patient-Specific Goal (Individualized)  6/12/2023 0005 by Isadora Dawn RN  Outcome: Ongoing, Progressing  6/12/2023 0005 by Isadora Dawn RN  Outcome: Ongoing, Progressing  Goal: Absence of Hospital-Acquired Illness or Injury  Outcome: Ongoing, Progressing  Goal: Optimal Comfort and Wellbeing  Outcome: Ongoing, Progressing  Goal: Readiness for Transition of Care  Outcome: Ongoing, Progressing     Problem: Diabetes Comorbidity  Goal: Blood Glucose Level Within Targeted Range  Outcome: Ongoing, Progressing     Problem: Impaired Wound Healing  Goal: Optimal Wound Healing  Outcome: Ongoing, Progressing     Problem: Infection  Goal: Absence of Infection Signs and Symptoms  Outcome: Ongoing, Progressing

## 2023-06-12 NOTE — SUBJECTIVE & OBJECTIVE
"Interval History: see "Hospital Course"    Review of Systems   Skin:  Positive for color change.   Allergic/Immunologic: Positive for immunocompromised state.   Objective:     Vital Signs (Most Recent):  Temp: 97.6 °F (36.4 °C) (06/12/23 0336)  Pulse: 77 (06/12/23 0336)  Resp: 18 (06/12/23 0336)  BP: 125/76 (06/12/23 0336)  SpO2: 96 % (06/12/23 0336) Vital Signs (24h Range):  Temp:  [97.6 °F (36.4 °C)-99.6 °F (37.6 °C)] 97.6 °F (36.4 °C)  Pulse:  [] 77  Resp:  [16-20] 18  SpO2:  [94 %-96 %] 96 %  BP: (122-165)/(70-89) 125/76     Weight: (!) 157.9 kg (348 lb 1.7 oz)  Body mass index is 51.41 kg/m².    Intake/Output Summary (Last 24 hours) at 6/12/2023 0790  Last data filed at 6/12/2023 0400  Gross per 24 hour   Intake 950 ml   Output --   Net 950 ml         Physical Exam  Vitals and nursing note reviewed.   Constitutional:       General: He is not in acute distress.     Appearance: He is obese.   HENT:      Head: Normocephalic.      Right Ear: External ear normal.      Left Ear: External ear normal.      Nose: Nose normal.      Mouth/Throat:      Mouth: Mucous membranes are moist.      Pharynx: Oropharynx is clear.   Eyes:      Extraocular Movements: Extraocular movements intact.      Conjunctiva/sclera: Conjunctivae normal.   Cardiovascular:      Rate and Rhythm: Normal rate and regular rhythm.      Pulses: Normal pulses.      Heart sounds: Normal heart sounds.   Pulmonary:      Effort: Pulmonary effort is normal.      Breath sounds: Normal breath sounds.   Abdominal:      General: Bowel sounds are normal.      Palpations: Abdomen is soft.   Musculoskeletal:         General: Swelling and tenderness present.      Cervical back: Normal range of motion and neck supple.      Left lower leg: Edema present.   Skin:     Findings: Erythema present.   Neurological:      Mental Status: He is alert. Mental status is at baseline.   Psychiatric:         Mood and Affect: Mood normal.         Behavior: Behavior normal.       "     Significant Labs: All pertinent labs within the past 24 hours have been reviewed.    Significant Imaging: I have reviewed all pertinent imaging results/findings within the past 24 hours.   (0) understands/communicates without difficulty

## 2023-06-12 NOTE — PLAN OF CARE
[facility]  Initial Discharge Assessment       Primary Care Provider: GALE Borjas    Admission Diagnosis: Cellulitis [L03.90]  Chest pain [R07.9]    Admission Date: 6/11/2023  Expected Discharge Date:     Transition of Care Barriers: None    Payor: BLUE CROSS [facility] / Plan: BCBS ALL OUT OF STATE / Product Type: PPO /     Extended Emergency Contact Information  Primary Emergency Contact: Roxie Soriano   [facility]  Home Phone: [phone]  Relation: Spouse    Discharge Plan A: Home  Discharge Plan B: Home with family      [id number] - [place], [place] - [address] & [facility]  [address] [facility] [address]  Phone: [phone] Fax: [phone]    DC assessment completed at bedside with pt, information on facesheet verified. Lives at listed address with spouse and 3 children. PCP is Nancy Hollis NP. Pharmacy is [facility] [address]. Denies coumadin, HH, HD. DME- CPAP. Drives self. Denies POA. Denies recent admission. Planning to DC home when clear.     Initial Assessment (most recent)       Adult Discharge Assessment - 06/12/23 1137          Discharge Assessment    Assessment Type Discharge Planning Assessment     Confirmed/corrected address, phone number and insurance Yes     Confirmed Demographics Correct on Facesheet     Source of Information patient     Communicated EFFIE with patient/caregiver Yes     People in Home spouse;child(pete), dependent     Facility Arrived From: home     Do you expect to return to your current living situation? Yes     Do you have help at home or someone to help you manage your care at home? Yes     Who are your caregiver(s) and their phone number(s)? spouse     Prior to hospitilization cognitive status: Alert/Oriented     Current cognitive status: Alert/Oriented     Equipment Currently Used at Home CPAP     Readmission within 30 days? No     Patient currently being followed by outpatient  case management? No     Do you currently have service(s) that help you manage your care at home? No     Do you take prescription medications? Yes     Do you have prescription coverage? Yes     Do you have any problems affording any of your prescribed medications? No     Is the patient taking medications as prescribed? yes     Who is going to help you get home at discharge? spouse     How do you get to doctors appointments? car, drives self     Are you on dialysis? No     Do you take coumadin? No     Discharge Plan A Home     Discharge Plan B Home with family     DME Needed Upon Discharge  none     Discharge Plan discussed with: Patient     Transition of Care Barriers None

## 2023-06-12 NOTE — NURSING
How Severe Is Your Skin Lesion?: mild Patient arrived to the floor from ED via wheelchair at 2034   Is This A New Presentation, Or A Follow-Up?: Skin Lesions

## 2023-06-12 NOTE — HOSPITAL COURSE
Kehinde Soriano is a 47 year old male with a past medical history of DM, HTN, HLD, JANKI, and obesity who presented with LLE cellulitis. Blood cultures are negative and MRSA swab is pending. He was placed on vancomycin and cefazolin and his extremity improved during his course. He was transitioned to PO doxycycline on discharge to complete another ten days of therapy. A referral to Podiatry was placed upon discharge. He will also followu up with his PCP in the outpatient setting.

## 2023-06-12 NOTE — CONSULTS
[facility]  Wound Care    Patient Name:  Kehinde Soriano   MRN:  [id number]  Date: 6/12/2023  Diagnosis: Cellulitis    History:     Past Medical History:   Diagnosis Date    Diabetes mellitus, type 2     Diverticulitis of colon     Hyperlipidemia     Hypertension     Sleep apnea        Social History     Socioeconomic History    Marital status:    Tobacco Use    Smoking status: Former     Packs/day: 0.50     Years: 10.00     Pack years: 5.00     Types: Cigarettes     Passive exposure: Current    Smokeless tobacco: Never   Substance and Sexual Activity    Alcohol use: No     Comment: ocassionally    Drug use: No    Sexual activity: Yes     Partners: Female     Social Determinants of Health     Financial Resource Strain: Low Risk     Difficulty of Paying Living Expenses: Not hard at all   Food Insecurity: No Food Insecurity    Worried About Running Out of Food in the Last Year: Never true    Ran Out of Food in the Last Year: Never true   Transportation Needs: No Transportation Needs    Lack of Transportation (Medical): No    Lack of Transportation (Non-Medical): No   Physical Activity: Insufficiently Active    Days of Exercise per Week: 2 days    Minutes of Exercise per Session: 10 min   Stress: Stress Concern Present    Feeling of Stress : To some extent   Social Connections: Unknown    Frequency of Communication with Friends and Family: More than three times a week    Frequency of Social Gatherings with Friends and Family: Twice a week    Attends [employer] Services: Patient refused    Active Member of Clubs or Organizations: Patient refused    Attends Club or Organization Meetings: Patient refused    Marital Status:    Housing Stability: Low Risk     Unable to Pay for Housing in the Last Year: No    Number of Places Lived in the Last Year: 1    Unstable Housing in the Last Year: No       Precautions:     Allergies as of 06/11/2023    (No Known Allergies)       WOC Assessment  Details/Treatment     LLE redness no wounds noted. LLE is swollen red and warm. Toenails are brittle no apparent wounds. Painted toes with betadine.                       06/12/2023

## 2023-06-12 NOTE — NURSING
Nurses Note -- 4 Eyes      6/12/2023   12:03 AM      Skin assessed during: Admit      [] No Altered Skin Integrity Present    []Prevention Measures Documented      [x] Yes- Altered Skin Integrity Present or Discovered   [x] LDA Added if Not in Epic (Describe Wound)   [x] New Altered Skin Integrity was Present on Admit and Documented in LDA   [x] Wound Image Taken    Wound Care Consulted? Yes    Attending Nurse:  Isadora Dawn RN     Second RN/Staff Member:  Damaris Ríos RN      English

## 2023-06-12 NOTE — ASSESSMENT & PLAN NOTE
Body mass index is 51.41 kg/m². Morbid obesity complicates all aspects of disease management from diagnostic modalities to treatment.

## 2023-06-12 NOTE — NURSING
Informed Mike Machado NP of patient and situation, ISBAR provided, explained to provider that patient is in 8/10 pain to his LLE (cellulitis). Informed provider that patient's blood pressure is 122/74, but his tramadol is ordered q6, last given at 1909, and not due for another hour. Provider voiced understanding and stated that it is okay to give patient his tramadol now.

## 2023-06-12 NOTE — ASSESSMENT & PLAN NOTE
Likely precipitated by toenail fungal infection.  -Vancomycin  -Cefazolin  -Blood cultures  -MRSA culture  -PRN analgesics  -May need Podiatry referral upon discharge

## 2023-06-12 NOTE — NURSING
Awake alert and oriented x4. Moves all extremities. Awaiting placement of saline loc. Ultrasound at bedside. Questions answered and encouraged.

## 2023-06-12 NOTE — CARE UPDATE
06/11/23 2024   Patient Assessment/Suction   Level of Consciousness (AVPU) alert   Respiratory Effort Unlabored   PRE-TX-O2   Device (Oxygen Therapy) room air   SpO2 95 %   Pulse Oximetry Type Intermittent   $ Pulse Oximetry - Single Charge Pulse Oximetry - Single   Pulse 91   Resp 20   Preset CPAP/BiPAP Settings   Mode Of Delivery other (see comments)  (pt has home cpap at bedside with him)

## 2023-06-12 NOTE — PLAN OF CARE
Problem: Adult Inpatient Plan of Care  Goal: Plan of Care Review  Outcome: Ongoing, Progressing     Problem: Adult Inpatient Plan of Care  Goal: Optimal Comfort and Wellbeing  Outcome: Ongoing, Progressing     Problem: Bariatric Environmental Safety  Goal: Safety Maintained with Care  Outcome: Ongoing, Progressing     Problem: Diabetes Comorbidity  Goal: Blood Glucose Level Within Targeted Range  Outcome: Ongoing, Progressing     Problem: Impaired Wound Healing  Goal: Optimal Wound Healing  Outcome: Ongoing, Progressing     Problem: Infection  Goal: Absence of Infection Signs and Symptoms  Outcome: Ongoing, Progressing

## 2023-06-12 NOTE — ASSESSMENT & PLAN NOTE
Patient's FSGs are controlled on current medication regimen.  Last A1c reviewed-   Lab Results   Component Value Date    HGBA1C 9.3 (H) 08/18/2022     Most recent fingerstick glucose reviewed-   Recent Labs   Lab 06/11/23 2021 06/11/23 2118 06/12/23  0718   POCTGLUCOSE 193* 169* 156*     Current correctional scale  Medium  Maintain anti-hyperglycemic dose as follows-   Antihyperglycemics (From admission, onward)    Start     Stop Route Frequency Ordered    06/11/23 1836  insulin aspart U-100 pen 1-10 Units         -- SubQ Before meals & nightly PRN 06/11/23 1738        Hold Oral hypoglycemics while patient is in the hospital.

## 2023-06-13 VITALS
WEIGHT: 315 LBS | BODY MASS INDEX: 46.65 KG/M2 | HEIGHT: 69 IN | DIASTOLIC BLOOD PRESSURE: 61 MMHG | TEMPERATURE: 98 F | HEART RATE: 67 BPM | OXYGEN SATURATION: 97 % | RESPIRATION RATE: 16 BRPM | SYSTOLIC BLOOD PRESSURE: 112 MMHG

## 2023-06-13 PROBLEM — E11.69 ONYCHOMYCOSIS OF MULTIPLE TOENAILS WITH TYPE 2 DIABETES MELLITUS: Status: ACTIVE | Noted: 2023-06-13

## 2023-06-13 PROBLEM — K75.81 NASH (NONALCOHOLIC STEATOHEPATITIS): Status: ACTIVE | Noted: 2023-06-13

## 2023-06-13 PROBLEM — B35.1 ONYCHOMYCOSIS OF MULTIPLE TOENAILS WITH TYPE 2 DIABETES MELLITUS: Status: ACTIVE | Noted: 2023-06-13

## 2023-06-13 LAB
ALBUMIN SERPL BCP-MCNC: 3.6 G/DL (ref 3.5–5.2)
ALP SERPL-CCNC: 67 U/L (ref 55–135)
ALT SERPL W/O P-5'-P-CCNC: 77 U/L (ref 10–44)
ANION GAP SERPL CALC-SCNC: 11 MMOL/L (ref 8–16)
AST SERPL-CCNC: 51 U/L (ref 10–40)
BASOPHILS # BLD AUTO: ABNORMAL K/UL (ref 0–0.2)
BASOPHILS NFR BLD: 0 % (ref 0–1.9)
BILIRUB SERPL-MCNC: 0.7 MG/DL (ref 0.1–1)
BUN SERPL-MCNC: 15 MG/DL (ref 6–20)
CALCIUM SERPL-MCNC: 9.2 MG/DL (ref 8.7–10.5)
CHLORIDE SERPL-SCNC: 101 MMOL/L (ref 95–110)
CO2 SERPL-SCNC: 23 MMOL/L (ref 23–29)
CREAT SERPL-MCNC: 0.9 MG/DL (ref 0.5–1.4)
DIFFERENTIAL METHOD: ABNORMAL
EOSINOPHIL # BLD AUTO: ABNORMAL K/UL (ref 0–0.5)
EOSINOPHIL NFR BLD: 5 % (ref 0–8)
ERYTHROCYTE [DISTWIDTH] IN BLOOD BY AUTOMATED COUNT: 13.1 % (ref 11.5–14.5)
EST. GFR  (NO RACE VARIABLE): >60 ML/MIN/1.73 M^2
GLUCOSE SERPL-MCNC: 199 MG/DL (ref 70–110)
HCT VFR BLD AUTO: 40.4 % (ref 40–54)
HGB BLD-MCNC: 13.4 G/DL (ref 14–18)
IMM GRANULOCYTES # BLD AUTO: ABNORMAL K/UL (ref 0–0.04)
IMM GRANULOCYTES NFR BLD AUTO: ABNORMAL % (ref 0–0.5)
LYMPHOCYTES # BLD AUTO: ABNORMAL K/UL (ref 1–4.8)
LYMPHOCYTES NFR BLD: 24 % (ref 18–48)
MAGNESIUM SERPL-MCNC: 1.9 MG/DL (ref 1.6–2.6)
MCH RBC QN AUTO: 30.9 PG (ref 27–31)
MCHC RBC AUTO-ENTMCNC: 33.2 G/DL (ref 32–36)
MCV RBC AUTO: 93 FL (ref 82–98)
MONOCYTES # BLD AUTO: ABNORMAL K/UL (ref 0.3–1)
MONOCYTES NFR BLD: 6 % (ref 4–15)
NEUTROPHILS NFR BLD: 58 % (ref 38–73)
NEUTS BAND NFR BLD MANUAL: 7 %
NRBC BLD-RTO: 0 /100 WBC
PHOSPHATE SERPL-MCNC: 4.1 MG/DL (ref 2.7–4.5)
PLATELET # BLD AUTO: 177 K/UL (ref 150–450)
PLATELET BLD QL SMEAR: ABNORMAL
PMV BLD AUTO: 10.9 FL (ref 9.2–12.9)
POCT GLUCOSE: 186 MG/DL (ref 70–110)
POCT GLUCOSE: 202 MG/DL (ref 70–110)
POTASSIUM SERPL-SCNC: 4 MMOL/L (ref 3.5–5.1)
PROT SERPL-MCNC: 7 G/DL (ref 6–8.4)
RBC # BLD AUTO: 4.34 M/UL (ref 4.6–6.2)
SODIUM SERPL-SCNC: 135 MMOL/L (ref 136–145)
WBC # BLD AUTO: 7.69 K/UL (ref 3.9–12.7)

## 2023-06-13 PROCEDURE — 36415 COLL VENOUS BLD VENIPUNCTURE: CPT | Performed by: STUDENT IN AN ORGANIZED HEALTH CARE EDUCATION/TRAINING PROGRAM

## 2023-06-13 PROCEDURE — 85027 COMPLETE CBC AUTOMATED: CPT | Performed by: STUDENT IN AN ORGANIZED HEALTH CARE EDUCATION/TRAINING PROGRAM

## 2023-06-13 PROCEDURE — 84100 ASSAY OF PHOSPHORUS: CPT | Performed by: STUDENT IN AN ORGANIZED HEALTH CARE EDUCATION/TRAINING PROGRAM

## 2023-06-13 PROCEDURE — 80053 COMPREHEN METABOLIC PANEL: CPT | Performed by: STUDENT IN AN ORGANIZED HEALTH CARE EDUCATION/TRAINING PROGRAM

## 2023-06-13 PROCEDURE — 85007 BL SMEAR W/DIFF WBC COUNT: CPT | Performed by: STUDENT IN AN ORGANIZED HEALTH CARE EDUCATION/TRAINING PROGRAM

## 2023-06-13 PROCEDURE — 83735 ASSAY OF MAGNESIUM: CPT | Performed by: STUDENT IN AN ORGANIZED HEALTH CARE EDUCATION/TRAINING PROGRAM

## 2023-06-13 PROCEDURE — 63600175 PHARM REV CODE 636 W HCPCS: Performed by: STUDENT IN AN ORGANIZED HEALTH CARE EDUCATION/TRAINING PROGRAM

## 2023-06-13 PROCEDURE — 25000003 PHARM REV CODE 250: Performed by: STUDENT IN AN ORGANIZED HEALTH CARE EDUCATION/TRAINING PROGRAM

## 2023-06-13 RX ORDER — MELOXICAM 15 MG/1
15 TABLET ORAL DAILY PRN
Qty: 15 TABLET | Refills: 0
Start: 2023-06-13 | End: 2023-09-21 | Stop reason: ALTCHOICE

## 2023-06-13 RX ORDER — DOXYCYCLINE 100 MG/1
100 CAPSULE ORAL 2 TIMES DAILY
Qty: 20 CAPSULE | Refills: 0 | Status: SHIPPED | OUTPATIENT
Start: 2023-06-13 | End: 2023-06-23

## 2023-06-13 RX ADMIN — CEFAZOLIN SODIUM 2 G: 2 SOLUTION INTRAVENOUS at 02:06

## 2023-06-13 RX ADMIN — CEFAZOLIN SODIUM 2 G: 2 SOLUTION INTRAVENOUS at 11:06

## 2023-06-13 RX ADMIN — LOSARTAN POTASSIUM 50 MG: 25 TABLET, FILM COATED ORAL at 08:06

## 2023-06-13 RX ADMIN — VANCOMYCIN HYDROCHLORIDE 2000 MG: 1 INJECTION, POWDER, LYOPHILIZED, FOR SOLUTION INTRAVENOUS at 08:06

## 2023-06-13 RX ADMIN — INSULIN ASPART 4 UNITS: 100 INJECTION, SOLUTION INTRAVENOUS; SUBCUTANEOUS at 11:06

## 2023-06-13 RX ADMIN — INSULIN ASPART 2 UNITS: 100 INJECTION, SOLUTION INTRAVENOUS; SUBCUTANEOUS at 07:06

## 2023-06-13 RX ADMIN — ATORVASTATIN CALCIUM 20 MG: 20 TABLET, FILM COATED ORAL at 08:06

## 2023-06-13 NOTE — NURSING
Discharge instructions given to pt. Pt verbalizes understanding. All questions encouraged and answered. PIV removed. Pt verbalizes understanding. Left floor safely via wheelchair with all belongings.

## 2023-06-13 NOTE — ASSESSMENT & PLAN NOTE
Likely precipitated by toenail fungal infection.  -Vancomycin and cefazolin transitioned to doxycycline on discharge  -MRSA culture  -PRN analgesics  -Podiatry referral upon discharge

## 2023-06-13 NOTE — PROGRESS NOTES
Pharmacokinetic Assessment Follow Up: IV Vancomycin    Vancomycin serum concentration assessment(s):    The trough level was drawn correctly and can be used to guide therapy at this time. The measurement is below the desired definitive target range of 10 to 15 mcg/mL.    Vancomycin Regimen Plan:    Continue regimen to Vancomycin 2000 mg IV every 12 hours with next serum trough concentration measured at one hour prior to 3rd dose on 6/13/23    Drug levels (last 3 results):  Recent Labs   Lab Result Units 06/12/23  1823   Vancomycin-Trough ug/mL 9.9*       Pharmacy will continue to follow and monitor vancomycin.    Please contact pharmacy at extension [phone] for questions regarding this assessment.    Thank you for the consult,   Ana Jimenez       Patient brief summary:  Kehinde Soriano is a 47 y.o. male initiated on antimicrobial therapy with IV Vancomycin for treatment of skin & soft tissue infection      Drug Allergies:   Review of patient's allergies indicates:  No Known Allergies    Actual Body Weight:   157.9 kg    Renal Function:   Estimated Creatinine Clearance: 133.5 mL/min (based on SCr of 1 mg/dL).,     Dialysis Method (if applicable):  N/A    CBC (last 72 hours):  Recent Labs   Lab Result Units 06/11/23  1701 06/12/23  0422   WBC K/uL 7.41 7.96   Hemoglobin g/dL 13.8* 13.7*   Hematocrit % 40.4 41.4   Platelets K/uL 172 170   Gran % % 68.4 61.1   Lymph % % 12.7* 14.6*   Mono % % 12.4 14.8   Eosinophil % % 2.3 3.1   Basophil % % 1.1 1.4   Differential Method  Automated Automated       Metabolic Panel (last 72 hours):  Recent Labs   Lab Result Units 06/11/23  1701 06/12/23  0422   Sodium mmol/L 135* 136   Potassium mmol/L 4.3 4.2   Chloride mmol/L 100 101   CO2 mmol/L 21* 22*   Glucose mg/dL 323* 178*   BUN mg/dL 17 15   Creatinine mg/dL 1.0 1.0   Albumin g/dL  --  3.7   Total Bilirubin mg/dL  --  0.7   Alkaline Phosphatase U/L  --  62   AST U/L  --  35   ALT U/L  --  56*   Magnesium mg/dL  --  2.1    Phosphorus mg/dL  --  3.8       Vancomycin Administrations:  vancomycin given in the last 96 hours                     vancomycin (VANCOCIN) 2,000 mg in dextrose 5 % (D5W) 500 mL IVPB (mg) 2,000 mg New Bag 06/12/23 0845     2,000 mg New Bag 06/11/23 1930                    Microbiologic Results:  Microbiology Results (last 7 days)       Procedure Component Value Units Date/Time    Culture, MRSA [[id number]] Collected: 06/12/23 0740    Order Status: Sent Specimen: MRSA source from Nares, Left Updated: 06/12/23 1215    Blood culture (site 2) [[id number]] Collected: 06/11/23 1755    Order Status: Completed Specimen: Blood from Antecubital, Right Arm Updated: 06/12/23 0715     Blood Culture, Routine No Growth to date    Narrative:      Site # 2, aerobic only    Blood culture (site 1) [[id number]] Collected: 06/11/23 1752    Order Status: Completed Specimen: Blood from Antecubital, Left Arm Updated: 06/12/23 0715     Blood Culture, Routine No Growth to date    Narrative:      Site # 1, aerobic and anaerobic    Culture, MRSA [[id number]]     Order Status: Canceled Specimen: MRSA source

## 2023-06-13 NOTE — PLAN OF CARE
POC/Meds reviewed, pt verbalized understanding. Vitals stable.  Afebrile.   accuchecks monitored. S/s insulin given. Home cpap at night. Ivf abx given per order. Up with x1 assist. Repositions self. Hourly/Q2hr rounding performed, safety maintained. Bed in lowest position, wheels locked, SR up x2, call light in easy reach. No  complaints at this time. Will continue to monitor.

## 2023-06-13 NOTE — DISCHARGE SUMMARY
[facility] Medicine  Discharge Summary      Patient Name: Kehinde Soriano  MRN: [id number]  YOSSI: [id number]  Patient Class: IP- Inpatient  Admission Date: 6/11/2023  Hospital Length of Stay: 1 days  Discharge Date and Time: No discharge date for patient encounter.  Attending Physician: Lillie Brown MD   Discharging Provider: Lillie Brown MD  Primary Care Provider: GALE Borjas    Primary Care Team: Networked reference to record PCT     HPI:   Kehinde Soriano is a 47 year old male with a past medical history of DM, HTN, HLD, JANKI, and obesity who presents with one day of LLE redness, tenderness, and swelling associated with fever and malaise. He denies any trauma to this extremity. He states that he took clindamycin PO for one day after being seen in an urgent care which did not improve the LLE. Hospital Medicine was consulted for admission.      * No surgery found *      Hospital Course:   Kehinde Soriano is a 47 year old male with a past medical history of DM, HTN, HLD, JANKI, and obesity who presented with LLE cellulitis. Blood cultures are negative and MRSA swab is pending. He was placed on vancomycin and cefazolin and his extremity improved during his course. He was transitioned to PO doxycycline on discharge to complete another ten days of therapy. A referral to Podiatry was placed upon discharge. He will also followu up with his PCP in the outpatient setting.       Goals of Care Treatment Preferences:  Code Status: Full Code      Consults:   Consults (From admission, onward)        Status Ordering Provider     Pharmacy to dose Vancomycin consult  Once        Provider:  (Not yet assigned)   [facility] for full Linked Orders Report.    Acknowledged LILLIE BROWN          Cardiac/Vascular  Hyperlipidemia  -Fibrate  -Statin      Hypertension  -Losartan 50  -Continue to monitor      ID  * Cellulitis  Likely precipitated by toenail fungal infection.  -Vancomycin and  cefazolin transitioned to doxycycline on discharge  -MRSA culture  -PRN analgesics  -Podiatry referral upon discharge    Endocrine  Severe obesity  Body mass index is 51.41 kg/m². Morbid obesity complicates all aspects of disease management from diagnostic modalities to treatment.        Diabetes  Patient's FSGs are controlled on current medication regimen.  Last A1c reviewed-   Lab Results   Component Value Date    HGBA1C 9.3 (H) 08/18/2022     Most recent fingerstick glucose reviewed-   Recent Labs   Lab 06/12/23  1056 06/12/23  1625 06/12/23 2014 06/13/23  0724   POCTGLUCOSE 191* 147* 239* 186*     Current correctional scale  Medium  Maintain anti-hyperglycemic dose as follows-   Antihyperglycemics (From admission, onward)    Start     Stop Route Frequency Ordered    06/11/23 1836  insulin aspart U-100 pen 1-10 Units         -- SubQ Before meals & nightly PRN 06/11/23 1738        Hold Oral hypoglycemics while patient is in the hospital.    GI  MORALEZ (nonalcoholic steatohepatitis)  Seen on ultrasound.      Other  Onychomycosis of multiple toenails with type 2 diabetes mellitus  -Referral to Podiatry    JANKI (obstructive sleep apnea)  -CPAP QHS        Final Active Diagnoses:    Diagnosis Date Noted POA    PRINCIPAL PROBLEM:  Cellulitis [L03.90] 06/11/2023 Yes    MORALEZ (nonalcoholic steatohepatitis) [K75.81] 06/13/2023 No    Onychomycosis of multiple toenails with type 2 diabetes mellitus [E11.69, B35.1] 06/13/2023 Yes    Hypertension [I10] 08/18/2022 Yes    Diabetes [E11.9] 08/18/2022 Yes    JANKI (obstructive sleep apnea) [G47.33] 08/18/2022 Yes    Severe obesity [E66.01] 11/03/2021 Yes    Hyperlipidemia [E78.5] 07/16/2018 Yes      Problems Resolved During this Admission:       Discharged Condition: stable    Disposition: Home or Self Care    Follow Up:   Follow-up Information     GALE Borjas Follow up in 2 week(s).    Specialty: Family Medicine  Contact information:  [address]  [address]  [place] [address]  [phone]                       Patient Instructions:      Ambulatory referral/consult to Podiatry   Standing Status: Future   Referral Priority: Routine Referral Type: Consultation   Referral Reason: Specialty Services Required   Requested Specialty: Podiatry   Number of Visits Requested: 1     Diet diabetic     Notify your health care provider if you experience any of the following:  increased confusion or weakness     Notify your health care provider if you experience any of the following:  persistent dizziness, light-headedness, or visual disturbances     Notify your health care provider if you experience any of the following:  severe persistent headache     Notify your health care provider if you experience any of the following:  difficulty breathing or increased cough     Notify your health care provider if you experience any of the following:  temperature >100.4     Notify your health care provider if you experience any of the following:  persistent nausea and vomiting or diarrhea     Notify your health care provider if you experience any of the following:  severe uncontrolled pain     Notify your health care provider if you experience any of the following:  redness, tenderness, or signs of infection (pain, swelling, redness, odor or green/yellow discharge around incision site)     Notify your health care provider if you experience any of the following:  worsening rash     Activity as tolerated       Significant Diagnostic Studies: Labs: All labs within the past 24 hours have been reviewed    Pending Diagnostic Studies:     None         Medications:  Reconciled Home Medications:      Medication List      START taking these medications    doxycycline 100 MG Cap  Commonly known as: VIBRAMYCIN  Take 1 capsule (100 mg total) by mouth 2 (two) times a day. for 10 days        CHANGE how you take these medications    fenofibrate 160 MG Tab  TAKE 1 TABLET(160 MG) BY MOUTH EVERY DAY  What  changed: when to take this     glipiZIDE 10 MG Tr24  Commonly known as: GLUCOTROL  TAKE 1 TABLET(10 MG) BY MOUTH DAILY WITH BREAKFAST  What changed: when to take this     meloxicam 15 MG tablet  Commonly known as: MOBIC  Take 1 tablet (15 mg total) by mouth daily as needed for Pain.  What changed:   · when to take this  · reasons to take this     metFORMIN 750 MG ER 24hr tablet  Commonly known as: GLUCOPHAGE-XR  TAKE 2 TABLETS(1500 MG) BY MOUTH DAILY WITH BREAKFAST  What changed: See the new instructions.        CONTINUE taking these medications    atorvastatin 20 MG tablet  Commonly known as: LIPITOR  Take 1 tablet (20 mg total) by mouth once daily.     cholecalciferol (vitamin D3) 1,250 mcg (50,000 unit) capsule  cholecalciferol (vitamin D3) 1,250 mcg (50,000 unit) capsule   TAKE ONE CAPSULE BY MOUTH EVERY WEEK     JARDIANCE 25 mg tablet  Generic drug: empagliflozin  TAKE 1 TABLET(25 MG) BY MOUTH EVERY DAY     losartan 50 MG tablet  Commonly known as: COZAAR  Take 50 mg by mouth once daily.     MOUNJARO 2.5 mg/0.5 mL Pnij  Generic drug: tirzepatide  SMARTSI.5 Milligram(s) SUB-Q Once a Week     ondansetron 4 MG Tbdl  Commonly known as: ZOFRAN-ODT  Take 1 tablet (4 mg total) by mouth every 8 (eight) hours as needed (nausea).     * ONETOUCH DELICA PLUS LANCET 33 gauge Misc  Generic drug: lancets  Apply topically 2 (two) times daily as needed.     * lancets 33 gauge Misc  OneTouch Delica Plus Lancet 33 gauge   USE TWICE DAILY AS DIRECTED AND AS NEEDED FOR BLOOD GLUCOSE TESTING     * ONETOUCH VERIO TEST STRIPS Strp  Generic drug: blood sugar diagnostic  2 (two) times daily as needed.     * blood sugar diagnostic Strp  OneTouch Verio test strips   TEST BLOOD SUGAR BID AND PRN     TRULICITY 0.75 mg/0.5 mL pen injector  Generic drug: dulaglutide  Inject into the skin.         * This list has 4 medication(s) that are the same as other medications prescribed for you. Read the directions carefully, and ask your doctor or  other care provider to review them with you.                Indwelling Lines/Drains at time of discharge:   Lines/Drains/Airways     None                 Time spent on the discharge of patient: 31 minutes         Kishore Brown MD  Department of Hospital Medicine  [facility]

## 2023-06-13 NOTE — ASSESSMENT & PLAN NOTE
Patient's FSGs are controlled on current medication regimen.  Last A1c reviewed-   Lab Results   Component Value Date    HGBA1C 9.3 (H) 08/18/2022     Most recent fingerstick glucose reviewed-   Recent Labs   Lab 06/12/23  1056 06/12/23  1625 06/12/23 2014 06/13/23  0724   POCTGLUCOSE 191* 147* 239* 186*     Current correctional scale  Medium  Maintain anti-hyperglycemic dose as follows-   Antihyperglycemics (From admission, onward)    Start     Stop Route Frequency Ordered    06/11/23 1836  insulin aspart U-100 pen 1-10 Units         -- SubQ Before meals & nightly PRN 06/11/23 1738        Hold Oral hypoglycemics while patient is in the hospital.

## 2023-06-13 NOTE — PLAN OF CARE
Pt is clear for discharge from CM standpoint once seen by \[facility]\"" medicine         06/13/23 5970   Final Note   Assessment Type Final Discharge Note   Anticipated Discharge Disposition Home

## 2023-06-14 ENCOUNTER — PATIENT OUTREACH (OUTPATIENT)
Dept: FAMILY MEDICINE | Facility: CLINIC | Age: 48
End: 2023-06-14

## 2023-06-14 LAB — MRSA SPEC QL CULT: NORMAL

## 2023-06-14 NOTE — TELEPHONE ENCOUNTER
Discharge Information     Discharge Date:   06/13/2023    Primary Discharge Diagnosis:  Left      Discharge Summary:  Reviewed      Medication & Order Review     Were medication changes made or new medications added?   Yes Pt prescribed Doxycycline and rx filled and tolerated well.     If so, has the patient filled the prescriptions?  Yes     Was Home Health ordered? No    If so, has Home Health contacted patient and/or initiated services?  No    Name of Home Health Agency? N/A    Durable Medical Equipment ordered?  No     If so, has the DME provider contacted patient and delivered equipment?  N/A    Follow Up               Any problems since discharge? No    How is the patient feeling since returning home?    Pt states no c/o since discharge and tolerating med well. KM  Have you set up recommended follow up appointments?  (cardiology, surgery, etc.) Pt states no appt scheduled with Podiatry clinic but calling clinic today. Will notify clinic with appt date and time. KM    Schedule Hospital Follow-up appointment within 7-14 days (preferably 7).      Notes:  47 male d/c on 06/13/2023 due to leg cellulitis and tx with Doxycyline and tolerating well. Pt referred to Podiatry clinic for eval/tx and states he will contact office for appt today. Pt adv he will contact clinic with appt date and times. Pt scheduled for HFU/TCM on 06/29/2023 at 8:40. Pt satisfied with appt time and date. KM            Taina Duenas

## 2023-06-15 ENCOUNTER — PATIENT OUTREACH (OUTPATIENT)
Dept: ADMINISTRATIVE | Facility: CLINIC | Age: 48
End: 2023-06-15
Payer: COMMERCIAL

## 2023-06-15 NOTE — PROGRESS NOTES
C3 nurse spoke with Kehinde Soriano for a TCC post hospital discharge follow up call. The patient has a scheduled [facility] appointment with GALE Borjas on 06/20/23 @ 1145.

## 2023-06-17 LAB
BACTERIA BLD CULT: NORMAL
BACTERIA BLD CULT: NORMAL

## 2023-06-20 ENCOUNTER — OFFICE VISIT (OUTPATIENT)
Dept: FAMILY MEDICINE | Facility: CLINIC | Age: 48
End: 2023-06-20
Payer: COMMERCIAL

## 2023-06-20 VITALS
RESPIRATION RATE: 18 BRPM | HEIGHT: 69 IN | SYSTOLIC BLOOD PRESSURE: 138 MMHG | DIASTOLIC BLOOD PRESSURE: 70 MMHG | BODY MASS INDEX: 46.65 KG/M2 | HEART RATE: 68 BPM | TEMPERATURE: 98 F | WEIGHT: 315 LBS

## 2023-06-20 DIAGNOSIS — R16.1 SPLENOMEGALY, NOT ELSEWHERE CLASSIFIED: Primary | ICD-10-CM

## 2023-06-20 DIAGNOSIS — R35.0 FREQUENT URINATION: ICD-10-CM

## 2023-06-20 DIAGNOSIS — Z09 HOSPITAL DISCHARGE FOLLOW-UP: ICD-10-CM

## 2023-06-20 DIAGNOSIS — M25.50 ARTHRALGIA, UNSPECIFIED JOINT: ICD-10-CM

## 2023-06-20 PROCEDURE — 1159F MED LIST DOCD IN RCRD: CPT | Mod: CPTII,S$GLB,, | Performed by: NURSE PRACTITIONER

## 2023-06-20 PROCEDURE — 3008F BODY MASS INDEX DOCD: CPT | Mod: CPTII,S$GLB,, | Performed by: NURSE PRACTITIONER

## 2023-06-20 PROCEDURE — 99495 TCM SERVICES (MODERATE COMPLEXITY): ICD-10-PCS | Mod: S$GLB,,, | Performed by: NURSE PRACTITIONER

## 2023-06-20 PROCEDURE — 3078F PR MOST RECENT DIASTOLIC BLOOD PRESSURE < 80 MM HG: ICD-10-PCS | Mod: CPTII,S$GLB,, | Performed by: NURSE PRACTITIONER

## 2023-06-20 PROCEDURE — 1159F PR MEDICATION LIST DOCUMENTED IN MEDICAL RECORD: ICD-10-PCS | Mod: CPTII,S$GLB,, | Performed by: NURSE PRACTITIONER

## 2023-06-20 PROCEDURE — 1111F PR DISCHARGE MEDS RECONCILED W/ CURRENT OUTPATIENT MED LIST: ICD-10-PCS | Mod: CPTII,S$GLB,, | Performed by: NURSE PRACTITIONER

## 2023-06-20 PROCEDURE — 99495 TRANSJ CARE MGMT MOD F2F 14D: CPT | Mod: S$GLB,,, | Performed by: NURSE PRACTITIONER

## 2023-06-20 PROCEDURE — 1111F DSCHRG MED/CURRENT MED MERGE: CPT | Mod: CPTII,S$GLB,, | Performed by: NURSE PRACTITIONER

## 2023-06-20 PROCEDURE — 3008F PR BODY MASS INDEX (BMI) DOCUMENTED: ICD-10-PCS | Mod: CPTII,S$GLB,, | Performed by: NURSE PRACTITIONER

## 2023-06-20 PROCEDURE — 3075F SYST BP GE 130 - 139MM HG: CPT | Mod: CPTII,S$GLB,, | Performed by: NURSE PRACTITIONER

## 2023-06-20 PROCEDURE — 4010F ACE/ARB THERAPY RXD/TAKEN: CPT | Mod: CPTII,S$GLB,, | Performed by: NURSE PRACTITIONER

## 2023-06-20 PROCEDURE — 3078F DIAST BP <80 MM HG: CPT | Mod: CPTII,S$GLB,, | Performed by: NURSE PRACTITIONER

## 2023-06-20 PROCEDURE — 4010F PR ACE/ARB THEARPY RXD/TAKEN: ICD-10-PCS | Mod: CPTII,S$GLB,, | Performed by: NURSE PRACTITIONER

## 2023-06-20 PROCEDURE — 3075F PR MOST RECENT SYSTOLIC BLOOD PRESS GE 130-139MM HG: ICD-10-PCS | Mod: CPTII,S$GLB,, | Performed by: NURSE PRACTITIONER

## 2023-06-20 NOTE — PROGRESS NOTES
SUBJECTIVE:    Patient ID: Richi Soriano is a 47 y.o. male.    Chief Complaint: Hospital Follow Up and Transitional Care (46 yo male here for HFU/TCC. Pt was adm to hosp on 06/11/2023 due to left leg cellulitis. Pt states no c/o. KM)    Mr. Soriano presents today for hospital discharge follow up. He was diagnosed with Cellulitis. His leg looks way better at this time. He is concerned due to him having ultrasound and finding enlarged spleen and liver cysyts. He is requesting further evaluation with CT scan at this time and that is not unreasonable. He is also due for updated labs. He lastly states he has been having joint pain in all extremities. He does not know why and the pain has been increasing as of late.     He denies any other issues at this time.       Admit Date: 6/11/23   Discharge Date: 6/13/23  Discharge Facility: Hospital     History of hospital stay: HPI:   Kehinde Soriano is a 47 year old male with a past medical history of DM, HTN, HLD, JANKI, and obesity who presents with one day of LLE redness, tenderness, and swelling associated with fever and malaise. He denies any trauma to this extremity. He states that he took clindamycin PO for one day after being seen in an urgent care which did not improve the LLE. Hospital Medicine was consulted for admission.        * No surgery found *       Hospital Course:   Kehinde Soriano is a 47 year old male with a past medical history of DM, HTN, HLD, JANKI, and obesity who presented with LLE cellulitis. Blood cultures are negative and MRSA swab is pending. He was placed on vancomycin and cefazolin and his extremity improved during his course. He was transitioned to PO doxycycline on discharge to complete another ten days of therapy. A referral to Podiatry was placed upon discharge. He will also followu up with his PCP in the outpatient setting.       How is patient feeling since discharge from the hospital?  He is feeling better since being discharged.           Medication Reconciliation:  No medication changes. Patient taking all medication as prescribed.   New Prescriptions filled after discharge: yes  Discharge summary reviewed:  yes  Pending test results at discharge reviewed:   not applicable  Follow up appointments scheduled:  not applicable   with Infectious Disease  Follow up labs/tests ordered:   yes  Home Health ordered on discharge:   not applicable  Home Health company name:  n/a  DME ordered at discharge:   not applicable      Admission on 06/11/2023, Discharged on 06/13/2023   Component Date Value Ref Range Status    WBC 06/11/2023 7.41  3.90 - 12.70 K/uL Final    RBC 06/11/2023 4.36 (L)  4.60 - 6.20 M/uL Final    Hemoglobin 06/11/2023 13.8 (L)  14.0 - 18.0 g/dL Final    Hematocrit 06/11/2023 40.4  40.0 - 54.0 % Final    MCV 06/11/2023 93  82 - 98 fL Final    MCH 06/11/2023 31.7 (H)  27.0 - 31.0 pg Final    MCHC 06/11/2023 34.2  32.0 - 36.0 g/dL Final    RDW 06/11/2023 13.3  11.5 - 14.5 % Final    Platelets 06/11/2023 172  150 - 450 K/uL Final    MPV 06/11/2023 10.7  9.2 - 12.9 fL Final    Immature Granulocytes 06/11/2023 3.1 (H)  0.0 - 0.5 % Final    Gran # (ANC) 06/11/2023 5.1  1.8 - 7.7 K/uL Final    Immature Grans (Abs) 06/11/2023 0.23 (H)  0.00 - 0.04 K/uL Final    Lymph # 06/11/2023 0.9 (L)  1.0 - 4.8 K/uL Final    Mono # 06/11/2023 0.9  0.3 - 1.0 K/uL Final    Eos # 06/11/2023 0.2  0.0 - 0.5 K/uL Final    Baso # 06/11/2023 0.08  0.00 - 0.20 K/uL Final    nRBC 06/11/2023 0  0 /100 WBC Final    Gran % 06/11/2023 68.4  38.0 - 73.0 % Final    Lymph % 06/11/2023 12.7 (L)  18.0 - 48.0 % Final    Mono % 06/11/2023 12.4  4.0 - 15.0 % Final    Eosinophil % 06/11/2023 2.3  0.0 - 8.0 % Final    Basophil % 06/11/2023 1.1  0.0 - 1.9 % Final    Differential Method 06/11/2023 Automated   Final    Sodium 06/11/2023 135 (L)  136 - 145 mmol/L Final    Potassium 06/11/2023 4.3  3.5 - 5.1 mmol/L Final    Chloride 06/11/2023 100  95 - 110 mmol/L Final    CO2 06/11/2023 21  (L)  23 - 29 mmol/L Final    Glucose 06/11/2023 323 (H)  70 - 110 mg/dL Final    BUN 06/11/2023 17  6 - 20 mg/dL Final    Creatinine 06/11/2023 1.0  0.5 - 1.4 mg/dL Final    Calcium 06/11/2023 9.4  8.7 - 10.5 mg/dL Final    Anion Gap 06/11/2023 14  8 - 16 mmol/L Final    eGFR 06/11/2023 >60  >60 mL/min/1.73 m^2 Final    Blood Culture, Routine 06/11/2023 No growth after 5 days.   Final    Blood Culture, Routine 06/11/2023 No growth after 5 days.   Final    POCT Glucose 06/11/2023 193 (H)  70 - 110 mg/dL Final    POCT Glucose 06/11/2023 169 (H)  70 - 110 mg/dL Final    Sodium 06/12/2023 136  136 - 145 mmol/L Final    Potassium 06/12/2023 4.2  3.5 - 5.1 mmol/L Final    Chloride 06/12/2023 101  95 - 110 mmol/L Final    CO2 06/12/2023 22 (L)  23 - 29 mmol/L Final    Glucose 06/12/2023 178 (H)  70 - 110 mg/dL Final    BUN 06/12/2023 15  6 - 20 mg/dL Final    Creatinine 06/12/2023 1.0  0.5 - 1.4 mg/dL Final    Calcium 06/12/2023 9.1  8.7 - 10.5 mg/dL Final    Total Protein 06/12/2023 7.3  6.0 - 8.4 g/dL Final    Albumin 06/12/2023 3.7  3.5 - 5.2 g/dL Final    Total Bilirubin 06/12/2023 0.7  0.1 - 1.0 mg/dL Final    Alkaline Phosphatase 06/12/2023 62  55 - 135 U/L Final    AST 06/12/2023 35  10 - 40 U/L Final    ALT 06/12/2023 56 (H)  10 - 44 U/L Final    Anion Gap 06/12/2023 13  8 - 16 mmol/L Final    eGFR 06/12/2023 >60  >60 mL/min/1.73 m^2 Final    Magnesium 06/12/2023 2.1  1.6 - 2.6 mg/dL Final    Phosphorus 06/12/2023 3.8  2.7 - 4.5 mg/dL Final    WBC 06/12/2023 7.96  3.90 - 12.70 K/uL Final    RBC 06/12/2023 4.38 (L)  4.60 - 6.20 M/uL Final    Hemoglobin 06/12/2023 13.7 (L)  14.0 - 18.0 g/dL Final    Hematocrit 06/12/2023 41.4  40.0 - 54.0 % Final    MCV 06/12/2023 95  82 - 98 fL Final    MCH 06/12/2023 31.3 (H)  27.0 - 31.0 pg Final    MCHC 06/12/2023 33.1  32.0 - 36.0 g/dL Final    RDW 06/12/2023 13.4  11.5 - 14.5 % Final    Platelets 06/12/2023 170  150 - 450 K/uL Final    MPV 06/12/2023 10.8  9.2 - 12.9 fL Final     Immature Granulocytes 06/12/2023 5.0 (H)  0.0 - 0.5 % Final    Gran # (ANC) 06/12/2023 4.9  1.8 - 7.7 K/uL Final    Immature Grans (Abs) 06/12/2023 0.40 (H)  0.00 - 0.04 K/uL Final    Lymph # 06/12/2023 1.2  1.0 - 4.8 K/uL Final    Mono # 06/12/2023 1.2 (H)  0.3 - 1.0 K/uL Final    Eos # 06/12/2023 0.3  0.0 - 0.5 K/uL Final    Baso # 06/12/2023 0.11  0.00 - 0.20 K/uL Final    nRBC 06/12/2023 0  0 /100 WBC Final    Gran % 06/12/2023 61.1  38.0 - 73.0 % Final    Lymph % 06/12/2023 14.6 (L)  18.0 - 48.0 % Final    Mono % 06/12/2023 14.8  4.0 - 15.0 % Final    Eosinophil % 06/12/2023 3.1  0.0 - 8.0 % Final    Basophil % 06/12/2023 1.4  0.0 - 1.9 % Final    Differential Method 06/12/2023 Automated   Final    POCT Glucose 06/12/2023 156 (H)  70 - 110 mg/dL Final    MRSA Surveillance Screen 06/12/2023 No MRSA isolated   Final    POCT Glucose 06/12/2023 191 (H)  70 - 110 mg/dL Final    POCT Glucose 06/12/2023 147 (H)  70 - 110 mg/dL Final    Vancomycin-Trough 06/12/2023 9.9 (L)  10.0 - 22.0 ug/mL Final    POCT Glucose 06/12/2023 239 (H)  70 - 110 mg/dL Final    Sodium 06/13/2023 135 (L)  136 - 145 mmol/L Final    Potassium 06/13/2023 4.0  3.5 - 5.1 mmol/L Final    Chloride 06/13/2023 101  95 - 110 mmol/L Final    CO2 06/13/2023 23  23 - 29 mmol/L Final    Glucose 06/13/2023 199 (H)  70 - 110 mg/dL Final    BUN 06/13/2023 15  6 - 20 mg/dL Final    Creatinine 06/13/2023 0.9  0.5 - 1.4 mg/dL Final    Calcium 06/13/2023 9.2  8.7 - 10.5 mg/dL Final    Total Protein 06/13/2023 7.0  6.0 - 8.4 g/dL Final    Albumin 06/13/2023 3.6  3.5 - 5.2 g/dL Final    Total Bilirubin 06/13/2023 0.7  0.1 - 1.0 mg/dL Final    Alkaline Phosphatase 06/13/2023 67  55 - 135 U/L Final    AST 06/13/2023 51 (H)  10 - 40 U/L Final    ALT 06/13/2023 77 (H)  10 - 44 U/L Final    Anion Gap 06/13/2023 11  8 - 16 mmol/L Final    eGFR 06/13/2023 >60  >60 mL/min/1.73 m^2 Final    Magnesium 06/13/2023 1.9  1.6 - 2.6 mg/dL Final    Phosphorus 06/13/2023 4.1   2.7 - 4.5 mg/dL Final    WBC 06/13/2023 7.69  3.90 - 12.70 K/uL Final    RBC 06/13/2023 4.34 (L)  4.60 - 6.20 M/uL Final    Hemoglobin 06/13/2023 13.4 (L)  14.0 - 18.0 g/dL Final    Hematocrit 06/13/2023 40.4  40.0 - 54.0 % Final    MCV 06/13/2023 93  82 - 98 fL Final    MCH 06/13/2023 30.9  27.0 - 31.0 pg Final    MCHC 06/13/2023 33.2  32.0 - 36.0 g/dL Final    RDW 06/13/2023 13.1  11.5 - 14.5 % Final    Platelets 06/13/2023 177  150 - 450 K/uL Final    MPV 06/13/2023 10.9  9.2 - 12.9 fL Final    Immature Granulocytes 06/13/2023 CANCELED  0.0 - 0.5 % Final    Immature Grans (Abs) 06/13/2023 CANCELED  0.00 - 0.04 K/uL Final    Lymph # 06/13/2023 CANCELED  1.0 - 4.8 K/uL Final    Mono # 06/13/2023 CANCELED  0.3 - 1.0 K/uL Final    Eos # 06/13/2023 CANCELED  0.0 - 0.5 K/uL Final    Baso # 06/13/2023 CANCELED  0.00 - 0.20 K/uL Final    nRBC 06/13/2023 0  0 /100 WBC Final    Gran % 06/13/2023 58.0  38.0 - 73.0 % Final    Lymph % 06/13/2023 24.0  18.0 - 48.0 % Final    Mono % 06/13/2023 6.0  4.0 - 15.0 % Final    Eosinophil % 06/13/2023 5.0  0.0 - 8.0 % Final    Basophil % 06/13/2023 0.0  0.0 - 1.9 % Final    Bands 06/13/2023 7.0  % Final    Platelet Estimate 06/13/2023 Appears normal   Final    Differential Method 06/13/2023 Manual   Final    POCT Glucose 06/13/2023 186 (H)  70 - 110 mg/dL Final    POCT Glucose 06/13/2023 202 (H)  70 - 110 mg/dL Final   Admission on 05/01/2023, Discharged on 05/01/2023   Component Date Value Ref Range Status    Specimen UA 05/01/2023 Urine, Clean Catch   Final    Color, UA 05/01/2023 Yellow  Yellow, Straw, Fannie Final    Appearance, UA 05/01/2023 Clear  Clear Final    pH, UA 05/01/2023 7.0  5.0 - 8.0 Final    Specific Gravity, UA 05/01/2023 1.030  1.005 - 1.030 Final    Protein, UA 05/01/2023 Negative  Negative Final    Glucose, UA 05/01/2023 4+ (A)  Negative Final    Ketones, UA 05/01/2023 Trace (A)  Negative Final    Bilirubin (UA) 05/01/2023 Negative  Negative Final    Occult Blood  UA 05/01/2023 Negative  Negative Final    Nitrite, UA 05/01/2023 Negative  Negative Final    Urobilinogen, UA 05/01/2023 Negative  Negative EU/dL Final    Leukocytes, UA 05/01/2023 Negative  Negative Final    RBC, UA 05/01/2023 0  0 - 4 /hpf Final    WBC, UA 05/01/2023 0  0 - 5 /hpf Final    Bacteria 05/01/2023 Negative  None-Occ /hpf Final    Yeast, UA 05/01/2023 None  None Final    Squam Epithel, UA 05/01/2023 0  /hpf Final    Hyaline Casts, UA 05/01/2023 0.00 (A)  0-1/lpf /lpf Final    Microscopic Comment 05/01/2023 SEE COMMENT   Final   Office Visit on 04/01/2023   Component Date Value Ref Range Status    SARS Coronavirus 2 Antigen 04/01/2023 Negative  Negative Final     Acceptable 04/01/2023 Yes   Final       Past Medical History:   Diagnosis Date    Diabetes mellitus, type 2     Diverticulitis of colon     Hyperlipidemia     Hypertension     Sleep apnea      Past Surgical History:   Procedure Laterality Date    ABDOMINAL SURGERY      APPENDECTOMY      COLON SURGERY      HERNIA REPAIR       Family History   Family history unknown: Yes       Marital Status:   Alcohol History:  reports no history of alcohol use.  Tobacco History:  reports that he has quit smoking. His smoking use included cigarettes. He has a 5.00 pack-year smoking history. He has been exposed to tobacco smoke. He has never used smokeless tobacco.  Drug History:  reports no history of drug use.    Review of patient's allergies indicates:  No Known Allergies    Current Outpatient Medications:     atorvastatin (LIPITOR) 20 MG tablet, Take 1 tablet (20 mg total) by mouth once daily., Disp: 30 tablet, Rfl: 0    cholecalciferol, vitamin D3, 1,250 mcg (50,000 unit) capsule, cholecalciferol (vitamin D3) 1,250 mcg (50,000 unit) capsule  TAKE ONE CAPSULE BY MOUTH EVERY WEEK, Disp: , Rfl:     doxycycline (VIBRAMYCIN) 100 MG Cap, Take 1 capsule (100 mg total) by mouth 2 (two) times a day. for 10 days, Disp: 20 capsule, Rfl: 0     fenofibrate 160 MG Tab, TAKE 1 TABLET(160 MG) BY MOUTH EVERY DAY, Disp: 90 tablet, Rfl: 1    glipiZIDE (GLUCOTROL) 10 MG TR24, TAKE 1 TABLET(10 MG) BY MOUTH DAILY WITH BREAKFAST, Disp: 90 tablet, Rfl: 0    JARDIANCE 25 mg tablet, TAKE 1 TABLET(25 MG) BY MOUTH EVERY DAY, Disp: 90 tablet, Rfl: 0    losartan (COZAAR) 50 MG tablet, Take 50 mg by mouth once daily., Disp: , Rfl:     meloxicam (MOBIC) 15 MG tablet, Take 1 tablet (15 mg total) by mouth daily as needed for Pain., Disp: 15 tablet, Rfl: 0    metFORMIN (GLUCOPHAGE-XR) 750 MG ER 24hr tablet, TAKE 2 TABLETS(1500 MG) BY MOUTH DAILY WITH BREAKFAST, Disp: 180 tablet, Rfl: 0    ondansetron (ZOFRAN-ODT) 4 MG TbDL, Take 1 tablet (4 mg total) by mouth every 8 (eight) hours as needed (nausea)., Disp: 20 tablet, Rfl: 0    blood sugar diagnostic Strp, OneTouch Verio test strips  TEST BLOOD SUGAR BID AND PRN, Disp: , Rfl:     lancets 33 gauge Misc, OneTouch Delica Plus Lancet 33 gauge  USE TWICE DAILY AS DIRECTED AND AS NEEDED FOR BLOOD GLUCOSE TESTING, Disp: , Rfl:     MOUNJARO 2.5 mg/0.5 mL PnIj, SMARTSI.5 Milligram(s) SUB-Q Once a Week, Disp: , Rfl:     ONETOUCH DELICA PLUS LANCET 33 gauge Misc, Apply topically 2 (two) times daily as needed., Disp: , Rfl:     ONETOUCH VERIO TEST STRIPS Strp, 2 (two) times daily as needed., Disp: , Rfl:     TRULICITY 0.75 mg/0.5 mL pen injector, Inject into the skin., Disp: , Rfl:     Review of Systems   Constitutional:  Positive for fatigue. Negative for activity change, appetite change and fever.   HENT:  Negative for congestion, dental problem, nosebleeds, postnasal drip, rhinorrhea, sinus pain, sore throat and tinnitus.    Eyes:  Negative for pain, discharge, itching and visual disturbance.   Respiratory:  Negative for cough, chest tightness, shortness of breath and wheezing.    Cardiovascular:  Negative for chest pain.   Gastrointestinal:  Positive for abdominal pain. Negative for blood in stool, constipation, diarrhea, nausea and  "vomiting.   Endocrine: Negative for cold intolerance, heat intolerance, polydipsia, polyphagia and polyuria.   Genitourinary:  Positive for frequency. Negative for difficulty urinating, flank pain, genital sores, hematuria and urgency.   Musculoskeletal:  Positive for arthralgias. Negative for myalgias.   Skin:  Negative for rash and wound.   Allergic/Immunologic: Negative for environmental allergies and food allergies.   Neurological:  Negative for dizziness, light-headedness and headaches.   Hematological:  Negative for adenopathy. Does not bruise/bleed easily.   Psychiatric/Behavioral:  Positive for dysphoric mood. Negative for behavioral problems, confusion, decreased concentration, sleep disturbance and suicidal ideas. The patient is nervous/anxious. The patient is not hyperactive.       Objective:      Vitals:    06/20/23 0840   BP: 138/70   Pulse: 68   Resp: 18   Temp: 98.2 °F (36.8 °C)   TempSrc: Oral   Weight: (!) 158.4 kg (349 lb 4.8 oz)   Height: 5' 9" (1.753 m)     Physical Exam  Vitals reviewed.   Constitutional:       General: He is not in acute distress.     Appearance: Normal appearance. He is morbidly obese. He is not ill-appearing, toxic-appearing or diaphoretic.      Comments: BMI 53.31   HENT:      Head: Normocephalic and atraumatic.      Right Ear: Tympanic membrane and external ear normal. There is no impacted cerumen.      Left Ear: Tympanic membrane and external ear normal. There is no impacted cerumen.      Nose: Nose normal. No congestion or rhinorrhea.      Mouth/Throat:      Mouth: Mucous membranes are moist.      Pharynx: Oropharynx is clear. No oropharyngeal exudate or posterior oropharyngeal erythema.   Eyes:      General: No scleral icterus.        Right eye: No discharge.         Left eye: No discharge.      Extraocular Movements: Extraocular movements intact.      Conjunctiva/sclera: Conjunctivae normal.      Pupils: Pupils are equal, round, and reactive to light.   Cardiovascular: "      Rate and Rhythm: Normal rate and regular rhythm.      Pulses: Normal pulses.      Heart sounds: Normal heart sounds. No murmur heard.    No friction rub. No gallop.   Pulmonary:      Effort: Pulmonary effort is normal. No respiratory distress.      Breath sounds: Normal breath sounds. No wheezing, rhonchi or rales.   Chest:      Chest wall: No tenderness.   Abdominal:      General: Abdomen is protuberant. Bowel sounds are normal.      Palpations: Abdomen is soft. There is no mass.      Tenderness: There is no abdominal tenderness. There is no guarding or rebound.   Musculoskeletal:         General: No swelling or signs of injury. Normal range of motion.      Cervical back: Normal range of motion and neck supple. No rigidity or tenderness.      Right lower leg: No edema.      Left lower leg: No edema.   Skin:     General: Skin is warm and dry.      Capillary Refill: Capillary refill takes less than 2 seconds.      Coloration: Skin is not pale.      Findings: No bruising or erythema.             Comments: Erythema due to cellulitis   Neurological:      General: No focal deficit present.      Mental Status: He is alert and oriented to person, place, and time. Mental status is at baseline.      Motor: No weakness.      Coordination: Coordination normal.      Gait: Gait normal.   Psychiatric:         Mood and Affect: Mood normal.         Behavior: Behavior normal.         Thought Content: Thought content normal.         Judgment: Judgment normal.       Assessment:       1. Splenomegaly, not elsewhere classified    2. Arthralgia, unspecified joint    3. Frequent urination    4. Hospital discharge follow-up         Plan:       Splenomegaly, not elsewhere classified  -     CT Abdomen Pelvis With Contrast; Future; Expected date: 06/20/2023    Arthralgia, unspecified joint  -     Rheumatoid Factor; Future; Expected date: 06/20/2023  -     NIKHIL Screen w/Reflex; Future; Expected date: 06/20/2023    Frequent urination  -      PSA, Screening; Future; Expected date: 06/20/2023    Hospital discharge follow-up      Follow up in about 3 months (around 9/20/2023), or if symptoms worsen or fail to improve, for DM.

## 2023-09-05 ENCOUNTER — OCCUPATIONAL HEALTH (OUTPATIENT)
Dept: URGENT CARE | Facility: CLINIC | Age: 48
End: 2023-09-05

## 2023-09-05 DIAGNOSIS — Z02.83 ENCOUNTER FOR DRUG SCREENING: Primary | ICD-10-CM

## 2023-09-05 LAB
BREATH ALCOHOL: 0
COLLECTION ONLY: NORMAL

## 2023-09-05 PROCEDURE — 80305 OOH COLLECTION ONLY DRUG SCREEN: ICD-10-PCS | Mod: S$GLB,,,

## 2023-09-05 PROCEDURE — 99499 PHYSICAL, BASIC COMPLEXITY: ICD-10-PCS | Mod: S$GLB,,,

## 2023-09-05 PROCEDURE — 92552 PURE TONE AUDIOMETRY AIR: CPT | Mod: S$GLB,,,

## 2023-09-05 PROCEDURE — 80305 DRUG TEST PRSMV DIR OPT OBS: CPT | Mod: S$GLB,,,

## 2023-09-05 PROCEDURE — 82075 POCT BREATH ALCOHOL TEST: ICD-10-PCS | Mod: S$GLB,,,

## 2023-09-05 PROCEDURE — 92552 AUDIOGRAM OCC MED: ICD-10-PCS | Mod: S$GLB,,,

## 2023-09-05 PROCEDURE — 82075 ASSAY OF BREATH ETHANOL: CPT | Mod: S$GLB,,,

## 2023-09-05 PROCEDURE — 99499 UNLISTED E&M SERVICE: CPT | Mod: S$GLB,,,

## 2023-09-21 ENCOUNTER — OFFICE VISIT (OUTPATIENT)
Dept: FAMILY MEDICINE | Facility: CLINIC | Age: 48
End: 2023-09-21
Payer: COMMERCIAL

## 2023-09-21 VITALS
TEMPERATURE: 98 F | WEIGHT: 315 LBS | SYSTOLIC BLOOD PRESSURE: 134 MMHG | HEART RATE: 100 BPM | DIASTOLIC BLOOD PRESSURE: 68 MMHG | RESPIRATION RATE: 20 BRPM | HEIGHT: 69 IN | BODY MASS INDEX: 46.65 KG/M2

## 2023-09-21 DIAGNOSIS — Z11.4 SCREENING FOR HIV WITHOUT PRESENCE OF RISK FACTORS: ICD-10-CM

## 2023-09-21 DIAGNOSIS — I10 HYPERTENSION, UNSPECIFIED TYPE: ICD-10-CM

## 2023-09-21 DIAGNOSIS — Z23 NEED FOR DIPHTHERIA-TETANUS-PERTUSSIS (TDAP) VACCINE: ICD-10-CM

## 2023-09-21 DIAGNOSIS — Z11.59 ENCOUNTER FOR HEPATITIS C SCREENING TEST FOR LOW RISK PATIENT: ICD-10-CM

## 2023-09-21 DIAGNOSIS — E78.1 HYPERTRIGLYCERIDEMIA WITHOUT HYPERCHOLESTEROLEMIA: ICD-10-CM

## 2023-09-21 DIAGNOSIS — E11.9 TYPE 2 DIABETES MELLITUS WITHOUT COMPLICATION, WITHOUT LONG-TERM CURRENT USE OF INSULIN: Primary | ICD-10-CM

## 2023-09-21 PROCEDURE — 3075F SYST BP GE 130 - 139MM HG: CPT | Mod: CPTII,S$GLB,, | Performed by: NURSE PRACTITIONER

## 2023-09-21 PROCEDURE — 3008F BODY MASS INDEX DOCD: CPT | Mod: CPTII,S$GLB,, | Performed by: NURSE PRACTITIONER

## 2023-09-21 PROCEDURE — 1159F PR MEDICATION LIST DOCUMENTED IN MEDICAL RECORD: ICD-10-PCS | Mod: CPTII,S$GLB,, | Performed by: NURSE PRACTITIONER

## 2023-09-21 PROCEDURE — 3078F PR MOST RECENT DIASTOLIC BLOOD PRESSURE < 80 MM HG: ICD-10-PCS | Mod: CPTII,S$GLB,, | Performed by: NURSE PRACTITIONER

## 2023-09-21 PROCEDURE — 3008F PR BODY MASS INDEX (BMI) DOCUMENTED: ICD-10-PCS | Mod: CPTII,S$GLB,, | Performed by: NURSE PRACTITIONER

## 2023-09-21 PROCEDURE — 99213 OFFICE O/P EST LOW 20 MIN: CPT | Mod: S$GLB,,, | Performed by: NURSE PRACTITIONER

## 2023-09-21 PROCEDURE — 3075F PR MOST RECENT SYSTOLIC BLOOD PRESS GE 130-139MM HG: ICD-10-PCS | Mod: CPTII,S$GLB,, | Performed by: NURSE PRACTITIONER

## 2023-09-21 PROCEDURE — 1159F MED LIST DOCD IN RCRD: CPT | Mod: CPTII,S$GLB,, | Performed by: NURSE PRACTITIONER

## 2023-09-21 PROCEDURE — 4010F ACE/ARB THERAPY RXD/TAKEN: CPT | Mod: CPTII,S$GLB,, | Performed by: NURSE PRACTITIONER

## 2023-09-21 PROCEDURE — 3078F DIAST BP <80 MM HG: CPT | Mod: CPTII,S$GLB,, | Performed by: NURSE PRACTITIONER

## 2023-09-21 PROCEDURE — 99213 PR OFFICE/OUTPT VISIT, EST, LEVL III, 20-29 MIN: ICD-10-PCS | Mod: S$GLB,,, | Performed by: NURSE PRACTITIONER

## 2023-09-21 PROCEDURE — 4010F PR ACE/ARB THEARPY RXD/TAKEN: ICD-10-PCS | Mod: CPTII,S$GLB,, | Performed by: NURSE PRACTITIONER

## 2023-09-21 RX ORDER — FENOFIBRATE 160 MG/1
160 TABLET ORAL DAILY
Qty: 90 TABLET | Refills: 1 | Status: SHIPPED | OUTPATIENT
Start: 2023-09-21

## 2023-09-21 RX ORDER — METFORMIN HYDROCHLORIDE 750 MG/1
TABLET, EXTENDED RELEASE ORAL
Qty: 180 TABLET | Refills: 0 | Status: SHIPPED | OUTPATIENT
Start: 2023-09-21 | End: 2023-12-21 | Stop reason: SDUPTHER

## 2023-09-21 RX ORDER — TIRZEPATIDE 5 MG/.5ML
5 INJECTION, SOLUTION SUBCUTANEOUS
Qty: 4 PEN | Refills: 2 | Status: SHIPPED | OUTPATIENT
Start: 2023-09-21 | End: 2023-12-20

## 2023-09-21 RX ORDER — GLIPIZIDE 10 MG/1
TABLET, FILM COATED, EXTENDED RELEASE ORAL
Qty: 90 TABLET | Refills: 1 | Status: SHIPPED | OUTPATIENT
Start: 2023-09-21

## 2023-09-21 RX ORDER — LOSARTAN POTASSIUM 50 MG/1
50 TABLET ORAL DAILY
Qty: 90 TABLET | Refills: 1 | Status: SHIPPED | OUTPATIENT
Start: 2023-09-21

## 2023-09-21 NOTE — PROGRESS NOTES
Patient ID: Richi Soriano is a 47 y.o. male.    Chief Complaint: Follow-up (46 yo female here for recheck DM. Pt states no c/o. KM)    Mr. Soriano presents today for 3 month follow up for chronic co morbidities. He states he is doing well at the time of this visit. He denies any other issues or complaints. He has follow up scheduled with Dr. Patel in the coming week.     Diabetes  He presents for his follow-up diabetic visit. He has type 2 diabetes mellitus. His disease course has been stable. Pertinent negatives for hypoglycemia include no confusion, dizziness, headaches, hunger, mood changes, nervousness/anxiousness, seizures, sleepiness, sweats or tremors. Associated symptoms include fatigue. Pertinent negatives for diabetes include no blurred vision and no chest pain. There are no hypoglycemic complications. Symptoms are stable. Pertinent negatives for diabetic complications include no CVA, PVD or retinopathy. Risk factors for coronary artery disease include diabetes mellitus, family history, dyslipidemia, male sex, obesity and hypertension. Current diabetic treatment includes diet and oral agent (dual therapy) (Mounjaro). He is compliant with treatment all of the time. His weight is stable. He is following a diabetic diet. He has not had a previous visit with a dietitian. He rarely participates in exercise. His home blood glucose trend is fluctuating minimally. His breakfast blood glucose is taken between 7-8 am. His breakfast blood glucose range is generally 130-140 mg/dl. His lunch blood glucose is taken between 1-2 pm. His lunch blood glucose range is generally 130-140 mg/dl. His dinner blood glucose is taken after 8 pm. His dinner blood glucose range is generally 130-140 mg/dl. His overall blood glucose range is 130-140 mg/dl. An ACE inhibitor/angiotensin II receptor blocker is being taken. He does not see a podiatrist.Eye exam is current.   Hypertension  This is a chronic problem. The current  episode started more than 1 year ago. The problem has been waxing and waning since onset. The problem is controlled. Associated symptoms include malaise/fatigue. Pertinent negatives include no anxiety, blurred vision, chest pain, headaches, neck pain, orthopnea, palpitations, peripheral edema, PND, shortness of breath or sweats. There are no associated agents to hypertension. Risk factors for coronary artery disease include diabetes mellitus, dyslipidemia, family history, male gender and obesity. Past treatments include angiotensin blockers. The current treatment provides significant improvement. Compliance problems include diet and exercise.  There is no history of angina, kidney disease, CAD/MI, CVA, heart failure, left ventricular hypertrophy, PVD or retinopathy.     We reviewed overdue health maintenance.  Tests to Keep You Healthy    Eye Exam: Met on 10/10/2022  Colon Cancer Screening: DUE  Last Blood Pressure <= 139/89 (9/21/2023): Yes  Last HbA1c < 8 (08/18/2022): NO Last one done 08/28/2023 A1C is 6.8 done in [facility]    Past Medical History:   Diagnosis Date    Diabetes mellitus, type 2     Diverticulitis of colon     Hyperlipidemia     Hypertension     Sleep apnea      Past Surgical History:   Procedure Laterality Date    ABDOMINAL SURGERY      APPENDECTOMY      COLON SURGERY      HERNIA REPAIR           Tobacco History:  reports that he has quit smoking. His smoking use included cigarettes. He has a 5.0 pack-year smoking history. He has been exposed to tobacco smoke. He has never used smokeless tobacco.      Review of patient's allergies indicates:  No Known Allergies    Current Outpatient Medications:     atorvastatin (LIPITOR) 20 MG tablet, Take 1 tablet (20 mg total) by mouth once daily., Disp: 30 tablet, Rfl: 0    blood sugar diagnostic Strp, OneTouch Verio test strips  TEST BLOOD SUGAR BID AND PRN, Disp: , Rfl:     empagliflozin (JARDIANCE) 25 mg tablet, Take 1 tablet (25 mg total) by mouth once  "daily., Disp: 90 tablet, Rfl: 1    fenofibrate 160 MG Tab, Take 1 tablet (160 mg total) by mouth once daily., Disp: 90 tablet, Rfl: 1    glipiZIDE (GLUCOTROL) 10 MG TR24, TAKE 1 TABLET(10 MG) BY MOUTH DAILY WITH BREAKFAST, Disp: 90 tablet, Rfl: 1    lancets 33 gauge Misc, OneTouch Delica Plus Lancet 33 gauge  USE TWICE DAILY AS DIRECTED AND AS NEEDED FOR BLOOD GLUCOSE TESTING, Disp: , Rfl:     losartan (COZAAR) 50 MG tablet, Take 1 tablet (50 mg total) by mouth once daily., Disp: 90 tablet, Rfl: 1    metFORMIN (GLUCOPHAGE-XR) 750 MG ER 24hr tablet, TAKE 2 TABLETS(1500 MG) BY MOUTH DAILY WITH BREAKFAST, Disp: 180 tablet, Rfl: 0    ONETOUCH DELICA PLUS LANCET 33 gauge Misc, Apply topically 2 (two) times daily as needed., Disp: , Rfl:     ONETOUCH VERIO TEST STRIPS Strp, 2 (two) times daily as needed., Disp: , Rfl:     tirzepatide (MOUNJARO) 5 mg/0.5 mL PnIj, Inject 5 mg into the skin every 7 days., Disp: 4 pen , Rfl: 2    Review of Systems   Constitutional:  Positive for fatigue and malaise/fatigue.   Eyes:  Negative for blurred vision.   Respiratory:  Negative for shortness of breath.    Cardiovascular:  Negative for chest pain, palpitations, orthopnea and PND.   Musculoskeletal:  Negative for neck pain.   Neurological:  Negative for dizziness, tremors, seizures and headaches.   Psychiatric/Behavioral:  Negative for confusion. The patient is not nervous/anxious.           Objective:      Vitals:    09/21/23 0854   BP: 134/68   Pulse: 100   Resp: 20   Temp: 98.2 °F (36.8 °C)   TempSrc: Oral   Weight: (!) 155.6 kg (343 lb)   Height: 5' 9" (1.753 m)     Physical Exam  Vitals reviewed.   Constitutional:       General: He is not in acute distress.     Appearance: Normal appearance. He is obese. He is not ill-appearing, toxic-appearing or diaphoretic.      Comments: BMI 50.65 kg lost 6 lbs since last visit.    HENT:      Head: Normocephalic and atraumatic.      Right Ear: Tympanic membrane and external ear normal. There is " no impacted cerumen.      Left Ear: Tympanic membrane and external ear normal. There is no impacted cerumen.      Nose: Nose normal. No congestion or rhinorrhea.      Mouth/Throat:      Mouth: Mucous membranes are moist.      Pharynx: Oropharynx is clear. No oropharyngeal exudate or posterior oropharyngeal erythema.   Eyes:      General: No scleral icterus.        Right eye: No discharge.         Left eye: No discharge.      Extraocular Movements: Extraocular movements intact.      Conjunctiva/sclera: Conjunctivae normal.      Pupils: Pupils are equal, round, and reactive to light.   Cardiovascular:      Rate and Rhythm: Normal rate and regular rhythm.      Pulses: Normal pulses.      Heart sounds: Normal heart sounds. No murmur heard.     No friction rub. No gallop.   Pulmonary:      Effort: Pulmonary effort is normal. No respiratory distress.      Breath sounds: Normal breath sounds. No wheezing, rhonchi or rales.   Chest:      Chest wall: No tenderness.   Abdominal:      General: Bowel sounds are normal.      Palpations: Abdomen is soft. There is no mass.      Tenderness: There is no abdominal tenderness. There is no guarding or rebound.   Musculoskeletal:         General: No swelling or signs of injury. Normal range of motion.      Cervical back: Normal range of motion and neck supple. No rigidity or tenderness.      Right lower leg: No edema.      Left lower leg: No edema.   Skin:     General: Skin is warm and dry.      Capillary Refill: Capillary refill takes less than 2 seconds.      Coloration: Skin is not pale.      Findings: No bruising or erythema.   Neurological:      General: No focal deficit present.      Mental Status: He is alert and oriented to person, place, and time. Mental status is at baseline.      Motor: No weakness.      Coordination: Coordination normal.      Gait: Gait normal.   Psychiatric:         Mood and Affect: Mood normal.         Behavior: Behavior normal.         Thought Content:  Thought content normal.         Judgment: Judgment normal.           Assessment:       1. Type 2 diabetes mellitus without complication, without long-term current use of insulin    2. Hypertriglyceridemia without hypercholesterolemia    3. Hypertension, unspecified type    4. Screening for HIV without presence of risk factors    5. Encounter for hepatitis C screening test for low risk patient    6. Need for diphtheria-tetanus-pertussis (Tdap) vaccine           Plan:       Type 2 diabetes mellitus without complication, without long-term current use of insulin  -     Hemoglobin A1C; Future; Expected date: 09/21/2023  -     metFORMIN (GLUCOPHAGE-XR) 750 MG ER 24hr tablet; TAKE 2 TABLETS(1500 MG) BY MOUTH DAILY WITH BREAKFAST  Dispense: 180 tablet; Refill: 0  -     glipiZIDE (GLUCOTROL) 10 MG TR24; TAKE 1 TABLET(10 MG) BY MOUTH DAILY WITH BREAKFAST  Dispense: 90 tablet; Refill: 1  -     empagliflozin (JARDIANCE) 25 mg tablet; Take 1 tablet (25 mg total) by mouth once daily.  Dispense: 90 tablet; Refill: 1  -     tirzepatide (MOUNJARO) 5 mg/0.5 mL PnIj; Inject 5 mg into the skin every 7 days.  Dispense: 4 pen ; Refill: 2  -     Microalbumin/creatinine urine ratio; Future; Expected date: 09/21/2023    Hypertriglyceridemia without hypercholesterolemia  -     fenofibrate 160 MG Tab; Take 1 tablet (160 mg total) by mouth once daily.  Dispense: 90 tablet; Refill: 1    Hypertension, unspecified type  -     losartan (COZAAR) 50 MG tablet; Take 1 tablet (50 mg total) by mouth once daily.  Dispense: 90 tablet; Refill: 1    Screening for HIV without presence of risk factors  -     HIV 1/2 Ag/Ab (4th Gen); Future; Expected date: 09/21/2023    Encounter for hepatitis C screening test for low risk patient  -     Hepatitis C Antibody; Future; Expected date: 09/21/2023    Need for diphtheria-tetanus-pertussis (Tdap) vaccine  -     (In Office Administered) Tdap Vaccine      Follow up in about 3 months (around 12/21/2023), or if symptoms  worsen or fail to improve, for DNN/HTN.        9/21/2023 Nancy Hollis, NP

## 2023-09-21 NOTE — LETTER
September 21, 2023      [facility]  [address]  [facility] [address]  Phone: [phone]  Fax: [phone]       Patient: Richi Soriano   YOB: 1975  Date of Visit: 09/21/2023    To Whom It May Concern:    Aurea Soriano  was at [facility] on 09/21/2023. The patient may return to work on 9/21/2023 with no restrictions. If you have any questions or concerns, or if I can be of further assistance, please do not hesitate to contact me.    Sincerely,         GALE Santillan

## 2023-12-20 LAB
ALBUMIN/CREAT UR: 98 MG/G CREAT (ref 0–29)
CREAT UR-MCNC: 73.7 MG/DL
HBA1C MFR BLD: 7 % (ref 4.8–5.6)
HCV IGG SERPL QL IA: NON REACTIVE
HIV 1+2 AB+HIV1 P24 AG SERPL QL IA: NON REACTIVE
MICROALBUMIN UR-MCNC: 72.2 UG/ML

## 2023-12-21 ENCOUNTER — OFFICE VISIT (OUTPATIENT)
Dept: FAMILY MEDICINE | Facility: CLINIC | Age: 48
End: 2023-12-21
Payer: COMMERCIAL

## 2023-12-21 VITALS
SYSTOLIC BLOOD PRESSURE: 117 MMHG | RESPIRATION RATE: 20 BRPM | TEMPERATURE: 98 F | BODY MASS INDEX: 46.65 KG/M2 | DIASTOLIC BLOOD PRESSURE: 70 MMHG | HEART RATE: 100 BPM | HEIGHT: 69 IN | WEIGHT: 315 LBS

## 2023-12-21 DIAGNOSIS — E11.9 TYPE 2 DIABETES MELLITUS WITHOUT COMPLICATION, WITHOUT LONG-TERM CURRENT USE OF INSULIN: ICD-10-CM

## 2023-12-21 PROCEDURE — 3066F NEPHROPATHY DOC TX: CPT | Mod: CPTII,,, | Performed by: NURSE PRACTITIONER

## 2023-12-21 PROCEDURE — 4010F ACE/ARB THERAPY RXD/TAKEN: CPT | Mod: CPTII,,, | Performed by: NURSE PRACTITIONER

## 2023-12-21 PROCEDURE — 3051F HG A1C>EQUAL 7.0%<8.0%: CPT | Mod: CPTII,,, | Performed by: NURSE PRACTITIONER

## 2023-12-21 PROCEDURE — 3008F PR BODY MASS INDEX (BMI) DOCUMENTED: ICD-10-PCS | Mod: CPTII,,, | Performed by: NURSE PRACTITIONER

## 2023-12-21 PROCEDURE — 3074F PR MOST RECENT SYSTOLIC BLOOD PRESSURE < 130 MM HG: ICD-10-PCS | Mod: CPTII,,, | Performed by: NURSE PRACTITIONER

## 2023-12-21 PROCEDURE — 3008F BODY MASS INDEX DOCD: CPT | Mod: CPTII,,, | Performed by: NURSE PRACTITIONER

## 2023-12-21 PROCEDURE — 3051F PR MOST RECENT HEMOGLOBIN A1C LEVEL 7.0 - < 8.0%: ICD-10-PCS | Mod: CPTII,,, | Performed by: NURSE PRACTITIONER

## 2023-12-21 PROCEDURE — 1159F PR MEDICATION LIST DOCUMENTED IN MEDICAL RECORD: ICD-10-PCS | Mod: CPTII,,, | Performed by: NURSE PRACTITIONER

## 2023-12-21 PROCEDURE — 1159F MED LIST DOCD IN RCRD: CPT | Mod: CPTII,,, | Performed by: NURSE PRACTITIONER

## 2023-12-21 PROCEDURE — 3078F PR MOST RECENT DIASTOLIC BLOOD PRESSURE < 80 MM HG: ICD-10-PCS | Mod: CPTII,,, | Performed by: NURSE PRACTITIONER

## 2023-12-21 PROCEDURE — 3066F PR DOCUMENTATION OF TREATMENT FOR NEPHROPATHY: ICD-10-PCS | Mod: CPTII,,, | Performed by: NURSE PRACTITIONER

## 2023-12-21 PROCEDURE — 4010F PR ACE/ARB THEARPY RXD/TAKEN: ICD-10-PCS | Mod: CPTII,,, | Performed by: NURSE PRACTITIONER

## 2023-12-21 PROCEDURE — 3074F SYST BP LT 130 MM HG: CPT | Mod: CPTII,,, | Performed by: NURSE PRACTITIONER

## 2023-12-21 PROCEDURE — 3060F PR POS MICROALBUMINURIA RESULT DOCUMENTED/REVIEW: ICD-10-PCS | Mod: CPTII,,, | Performed by: NURSE PRACTITIONER

## 2023-12-21 PROCEDURE — 3078F DIAST BP <80 MM HG: CPT | Mod: CPTII,,, | Performed by: NURSE PRACTITIONER

## 2023-12-21 PROCEDURE — 3060F POS MICROALBUMINURIA REV: CPT | Mod: CPTII,,, | Performed by: NURSE PRACTITIONER

## 2023-12-21 PROCEDURE — 99213 OFFICE O/P EST LOW 20 MIN: CPT | Mod: ,,, | Performed by: NURSE PRACTITIONER

## 2023-12-21 PROCEDURE — 99213 PR OFFICE/OUTPT VISIT, EST, LEVL III, 20-29 MIN: ICD-10-PCS | Mod: ,,, | Performed by: NURSE PRACTITIONER

## 2023-12-21 RX ORDER — METFORMIN HYDROCHLORIDE 750 MG/1
TABLET, EXTENDED RELEASE ORAL
Qty: 180 TABLET | Refills: 1 | Status: SHIPPED | OUTPATIENT
Start: 2023-12-21

## 2023-12-21 NOTE — LETTER
December 21, 2023      [facility] - [address]  [address] [place] [address]  Phone: [phone]  Fax: [phone]       Patient: Richi Soriano   YOB: 1975  Date of Visit: 12/21/2023    To Whom It May Concern:    Aurea Soriano  was at [facility] on 12/21/2023. The patient may return to work/school on 12/22/2023 with no restrictions. If you have any questions or concerns, or if I can be of further assistance, please do not hesitate to contact me.    Sincerely,        GALE Santillan

## 2023-12-22 NOTE — PROGRESS NOTES
Patient ID: Richi Soriano is a 48 y.o. male.    Chief Complaint: Follow-up (49 yo male here for 3 month recheck. Pt states no c/o. KM)    Mr. Soriano presents today for 3 month follow up for chronic co morbidities. He states he is doing well at the time of this visit. He denies any other issues or complaints. He has follow up scheduled with Dr. Patel in the coming week.      Diabetes  He presents for his follow-up diabetic visit. He has type 2 diabetes mellitus. His disease course has been stable. Pertinent negatives for hypoglycemia include no confusion, dizziness, headaches, hunger, mood changes, nervousness/anxiousness, seizures, sleepiness, sweats or tremors. Associated symptoms include fatigue. Pertinent negatives for diabetes include no blurred vision and no chest pain. There are no hypoglycemic complications. Symptoms are stable. Pertinent negatives for diabetic complications include no CVA, PVD or retinopathy. Risk factors for coronary artery disease include diabetes mellitus, family history, dyslipidemia, male sex, obesity and hypertension. Current diabetic treatment includes diet and oral agent (dual therapy) (Mounjaro). He is compliant with treatment all of the time. His weight is stable. He is following a diabetic diet. He has not had a previous visit with a dietitian. He rarely participates in exercise. His home blood glucose trend is fluctuating minimally. His breakfast blood glucose is taken between 7-8 am. His breakfast blood glucose range is generally 130-140 mg/dl. His lunch blood glucose is taken between 1-2 pm. His lunch blood glucose range is generally 130-140 mg/dl. His dinner blood glucose is taken after 8 pm. His dinner blood glucose range is generally 130-140 mg/dl. His overall blood glucose range is 130-140 mg/dl. An ACE inhibitor/angiotensin II receptor blocker is being taken. He does not see a podiatrist.Eye exam is current.   Hypertension  This is a chronic problem. The current  episode started more than 1 year ago. The problem has been waxing and waning since onset. The problem is controlled. Associated symptoms include malaise/fatigue. Pertinent negatives include no anxiety, blurred vision, chest pain, headaches, neck pain, orthopnea, palpitations, peripheral edema, PND, shortness of breath or sweats. There are no associated agents to hypertension. Risk factors for coronary artery disease include diabetes mellitus, dyslipidemia, family history, male gender and obesity. Past treatments include angiotensin blockers. The current treatment provides significant improvement. Compliance problems include diet and exercise.  There is no history of angina, kidney disease, CAD/MI, CVA, heart failure, left ventricular hypertrophy, PVD or retinopathy.                  Past Medical History:   Diagnosis Date    Diabetes mellitus, type 2     Diverticulitis of colon     Hyperlipidemia     Hypertension     Sleep apnea      Past Surgical History:   Procedure Laterality Date    ABDOMINAL SURGERY      APPENDECTOMY      COLON SURGERY      HERNIA REPAIR           Tobacco History:  reports that he has quit smoking. His smoking use included cigarettes. He has a 5.0 pack-year smoking history. He has been exposed to tobacco smoke. He has never used smokeless tobacco.      Review of patient's allergies indicates:  No Known Allergies    Current Outpatient Medications:     atorvastatin (LIPITOR) 20 MG tablet, Take 1 tablet (20 mg total) by mouth once daily., Disp: 30 tablet, Rfl: 0    empagliflozin (JARDIANCE) 25 mg tablet, Take 1 tablet (25 mg total) by mouth once daily., Disp: 90 tablet, Rfl: 1    fenofibrate 160 MG Tab, Take 1 tablet (160 mg total) by mouth once daily., Disp: 90 tablet, Rfl: 1    glipiZIDE (GLUCOTROL) 10 MG TR24, TAKE 1 TABLET(10 MG) BY MOUTH DAILY WITH BREAKFAST, Disp: 90 tablet, Rfl: 1    losartan (COZAAR) 50 MG tablet, Take 1 tablet (50 mg total) by mouth once daily., Disp: 90 tablet, Rfl: 1     "blood sugar diagnostic Strp, OneTouch Verio test strips  TEST BLOOD SUGAR BID AND PRN, Disp: , Rfl:     lancets 33 gauge Misc, OneTouch Delica Plus Lancet 33 gauge  USE TWICE DAILY AS DIRECTED AND AS NEEDED FOR BLOOD GLUCOSE TESTING, Disp: , Rfl:     metFORMIN (GLUCOPHAGE-XR) 750 MG ER 24hr tablet, TAKE 2 TABLETS(1500 MG) BY MOUTH DAILY WITH BREAKFAST, Disp: 180 tablet, Rfl: 1    ONETOUCH DELICA PLUS LANCET 33 gauge Misc, Apply topically 2 (two) times daily as needed., Disp: , Rfl:     ONETOUCH VERIO TEST STRIPS Strp, 2 (two) times daily as needed., Disp: , Rfl:     tirzepatide 7.5 mg/0.5 mL PnIj, Inject 7.5 mg into the skin every 7 days., Disp: 4 pen , Rfl: 2    Review of Systems   Constitutional:  Positive for fatigue.   Respiratory:  Negative for shortness of breath.    Cardiovascular:  Negative for chest pain and palpitations.   Musculoskeletal:  Negative for neck pain.   Neurological:  Negative for dizziness, tremors, seizures and headaches.   Psychiatric/Behavioral:  Negative for confusion. The patient is not nervous/anxious.           Objective:      Vitals:    12/21/23 1326   BP: 117/70   Pulse: 100   Resp: 20   Temp: 98 °F (36.7 °C)   TempSrc: Oral   Weight: (!) 157 kg (346 lb 3.2 oz)   Height: 5' 9" (1.753 m)     Physical Exam  Vitals reviewed.   Constitutional:       General: He is not in acute distress.     Appearance: Normal appearance. He is obese. He is not ill-appearing, toxic-appearing or diaphoretic.      Comments: BMI 51.12 kg gained 7 lbs since last visit.    HENT:      Head: Normocephalic and atraumatic.      Right Ear: Tympanic membrane and external ear normal. There is no impacted cerumen.      Left Ear: Tympanic membrane and external ear normal. There is no impacted cerumen.      Nose: Nose normal. No congestion or rhinorrhea.      Mouth/Throat:      Mouth: Mucous membranes are moist.      Pharynx: Oropharynx is clear. No oropharyngeal exudate or posterior oropharyngeal erythema.   Eyes:      " General: No scleral icterus.        Right eye: No discharge.         Left eye: No discharge.      Extraocular Movements: Extraocular movements intact.      Conjunctiva/sclera: Conjunctivae normal.      Pupils: Pupils are equal, round, and reactive to light.   Cardiovascular:      Rate and Rhythm: Normal rate and regular rhythm.      Pulses: Normal pulses.      Heart sounds: Normal heart sounds. No murmur heard.     No friction rub. No gallop.   Pulmonary:      Effort: Pulmonary effort is normal. No respiratory distress.      Breath sounds: Normal breath sounds. No wheezing, rhonchi or rales.   Chest:      Chest wall: No tenderness.   Abdominal:      General: Bowel sounds are normal.      Palpations: Abdomen is soft. There is no mass.      Tenderness: There is no abdominal tenderness. There is no guarding or rebound.   Musculoskeletal:         General: No swelling or signs of injury. Normal range of motion.      Cervical back: Normal range of motion and neck supple. No rigidity or tenderness.      Right lower leg: No edema.      Left lower leg: No edema.   Skin:     General: Skin is warm and dry.      Capillary Refill: Capillary refill takes less than 2 seconds.      Coloration: Skin is not pale.      Findings: No bruising or erythema.   Neurological:      General: No focal deficit present.      Mental Status: He is alert and oriented to person, place, and time. Mental status is at baseline.      Motor: No weakness.      Coordination: Coordination normal.      Gait: Gait normal.   Psychiatric:         Mood and Affect: Mood normal.         Behavior: Behavior normal.         Thought Content: Thought content normal.         Judgment: Judgment normal.           Assessment:       1. Type 2 diabetes mellitus without complication, without long-term current use of insulin           Plan:       Type 2 diabetes mellitus without complication, without long-term current use of insulin  -     tirzepatide 7.5 mg/0.5 mL PnIj; Inject  7.5 mg into the skin every 7 days.  Dispense: 4 pen ; Refill: 2  -     metFORMIN (GLUCOPHAGE-XR) 750 MG ER 24hr tablet; TAKE 2 TABLETS(1500 MG) BY MOUTH DAILY WITH BREAKFAST  Dispense: 180 tablet; Refill: 1  -     HEMOGLOBIN A1C; Future; Expected date: 03/21/2024  -     COMPREHENSIVE METABOLIC PANEL; Future; Expected date: 03/21/2024      Follow up in about 3 months (around 3/21/2024), or if symptoms worsen or fail to improve, for DM.        12/21/2023 Nancy Hollis, NP

## 2024-02-12 ENCOUNTER — HOSPITAL ENCOUNTER (EMERGENCY)
Facility: HOSPITAL | Age: 49
Discharge: HOME | End: 2024-02-12
Attending: STUDENT IN AN ORGANIZED HEALTH CARE EDUCATION/TRAINING PROGRAM
Payer: COMMERCIAL

## 2024-02-12 VITALS
HEART RATE: 96 BPM | SYSTOLIC BLOOD PRESSURE: 146 MMHG | BODY MASS INDEX: 51.1 KG/M2 | DIASTOLIC BLOOD PRESSURE: 84 MMHG | RESPIRATION RATE: 16 BRPM | OXYGEN SATURATION: 95 % | WEIGHT: 315 LBS | TEMPERATURE: 98 F

## 2024-02-12 DIAGNOSIS — R04.0 EPISTAXIS: Primary | ICD-10-CM

## 2024-02-12 PROCEDURE — 99281 EMR DPT VST MAYX REQ PHY/QHP: CPT

## 2024-02-12 NOTE — ED PROVIDER NOTES
Encounter Date: 2/12/2024       History     Chief Complaint   Patient presents with    Epistaxis     X 40 min. No active bleeding noted at this time.      48-year-old male presents for evaluation of epistaxis.  He has JANKI and sleeps with a CPAP.  His house lost power, he awoke, he took CPAP off and he started having a nosebleed.  The nosebleed lasted about 40 minutes and resolved on arrival here.  He has no longer bleeding actively.  He has a history of diabetes, on glipizide, hypertension on losartan.  He does not take anticoagulation or antiplatelet medications.  He has not had this issue before.      Review of patient's allergies indicates:  No Known Allergies  Past Medical History:   Diagnosis Date    Diabetes mellitus, type 2     Diverticulitis of colon     Hyperlipidemia     Hypertension     Sleep apnea      Past Surgical History:   Procedure Laterality Date    ABDOMINAL SURGERY      APPENDECTOMY      COLON SURGERY      HERNIA REPAIR       Family History   Family history unknown: Yes     Social History     Tobacco Use    Smoking status: Former     Current packs/day: 0.50     Average packs/day: 0.5 packs/day for 10.0 years (5.0 ttl pk-yrs)     Types: Cigarettes     Passive exposure: Past    Smokeless tobacco: Never   Substance Use Topics    Alcohol use: No     Comment: ocassionally    Drug use: No     Review of Systems   Constitutional:  Negative for activity change and appetite change.   HENT:  Positive for nosebleeds. Negative for congestion and drooling.    Eyes:  Negative for discharge and itching.   Respiratory:  Negative for cough and chest tightness.    Cardiovascular:  Negative for chest pain and leg swelling.   Gastrointestinal:  Negative for abdominal distention and abdominal pain.   Genitourinary:  Negative for difficulty urinating and dysuria.   Musculoskeletal:  Negative for arthralgias.   Skin:  Negative for color change and pallor.   Neurological:  Negative for dizziness and facial asymmetry.    Psychiatric/Behavioral:  Negative for agitation and behavioral problems.        Physical Exam     Initial Vitals   BP Pulse Resp Temp SpO2   02/12/24 0438 02/12/24 0437 02/12/24 0437 02/12/24 0437 02/12/24 0438   (!) 146/84 96 17 98.2 °F (36.8 °C) 95 %      MAP       --                Physical Exam    Nursing note and vitals reviewed.  Constitutional: He appears well-developed and well-nourished.   HENT:   Head: Normocephalic and atraumatic.   Mouth/Throat: Oropharynx is clear and moist.   Dried blood in the anterior nares on the right   Eyes: Conjunctivae and EOM are normal. Pupils are equal, round, and reactive to light.   Neck: No thyromegaly present.   Normal range of motion.  Cardiovascular:  Normal rate, regular rhythm and intact distal pulses.           Pulmonary/Chest: Breath sounds normal. No respiratory distress. He has no wheezes.   Abdominal: Abdomen is soft. Bowel sounds are normal. He exhibits no distension. There is no abdominal tenderness.   Musculoskeletal:         General: No tenderness or edema. Normal range of motion.      Cervical back: Normal range of motion.     Neurological: He is alert and oriented to person, place, and time. He has normal strength. No cranial nerve deficit.   Skin: Skin is warm and dry. No rash noted.   Psychiatric: He has a normal mood and affect. His behavior is normal. Thought content normal.         ED Course   Procedures  Labs Reviewed - No data to display       Imaging Results    None          Medications - No data to display  Medical Decision Making  Differential diagnosis includes anterior nosebleed, posterior nosebleed, resolved nosebleed               ED Course as of 02/12/24 0506   Mon Feb 12, 2024   0505 Bleeding controlled with direct pressure.  Vitals within normal limits, Likely this is an anterior nosebleed due to CPAP.  Discussed use of humidified air.  Discussed how to properly stop a nosebleed if recurs.  He is comfortable with that plan and ready to  go. [BS]      ED Course User Index  [BS] Felix Vieyra MD                           Clinical Impression:  Final diagnoses:  [R04.0] Epistaxis (Primary)          ED Disposition Condition    Discharge Stable          ED Prescriptions    None       Follow-up Information       Follow up With Specialties Details Why Contact Info    Nancy Hollis, FNP-C Family Medicine Call in 1 day To recheck today's symptoms, To set up a follow-up appointment [address] [address]  [phone]               Felix Vieyra MD  02/12/24 0508

## 2024-02-12 NOTE — DISCHARGE INSTRUCTIONS

## 2024-02-15 ENCOUNTER — PATIENT MESSAGE (OUTPATIENT)
Dept: INTERNAL MEDICINE | Facility: CLINIC | Age: 49
End: 2024-02-15
Payer: COMMERCIAL

## 2024-03-05 ENCOUNTER — PATIENT MESSAGE (OUTPATIENT)
Dept: INTERNAL MEDICINE | Facility: CLINIC | Age: 49
End: 2024-03-05
Payer: COMMERCIAL

## 2024-03-12 ENCOUNTER — PATIENT MESSAGE (OUTPATIENT)
Dept: ADMINISTRATIVE | Facility: HOSPITAL | Age: 49
End: 2024-03-12
Payer: COMMERCIAL

## 2024-06-17 DIAGNOSIS — E11.9 TYPE 2 DIABETES MELLITUS WITHOUT COMPLICATION, WITHOUT LONG-TERM CURRENT USE OF INSULIN: ICD-10-CM

## 2024-06-18 RX ORDER — TIRZEPATIDE 7.5 MG/.5ML
INJECTION, SOLUTION SUBCUTANEOUS
Qty: 2 ML | Refills: 2 | Status: SHIPPED | OUTPATIENT
Start: 2024-06-18

## 2024-07-22 ENCOUNTER — OFFICE VISIT (OUTPATIENT)
Dept: URGENT CARE | Facility: CLINIC | Age: 49
End: 2024-07-22
Payer: COMMERCIAL

## 2024-07-22 VITALS
OXYGEN SATURATION: 97 % | TEMPERATURE: 98 F | BODY MASS INDEX: 51.69 KG/M2 | RESPIRATION RATE: 18 BRPM | HEART RATE: 74 BPM | SYSTOLIC BLOOD PRESSURE: 148 MMHG | DIASTOLIC BLOOD PRESSURE: 82 MMHG | WEIGHT: 315 LBS

## 2024-07-22 DIAGNOSIS — H10.33 ACUTE BACTERIAL CONJUNCTIVITIS OF BOTH EYES: Primary | ICD-10-CM

## 2024-07-22 DIAGNOSIS — H57.89 IRRITATION OF BOTH EYES: ICD-10-CM

## 2024-07-22 PROCEDURE — 99204 OFFICE O/P NEW MOD 45 MIN: CPT | Mod: S$GLB,,,

## 2024-07-22 RX ORDER — OFLOXACIN 3 MG/ML
1 SOLUTION/ DROPS OPHTHALMIC 4 TIMES DAILY
Qty: 10 ML | Refills: 0 | Status: SHIPPED | OUTPATIENT
Start: 2024-07-22 | End: 2024-07-29

## 2024-07-22 NOTE — PROGRESS NOTES
Subjective:      Patient ID: Richi Soriano is a 48 y.o. male.    Vitals:  weight is 158.8 kg (350 lb) (abnormal). His oral temperature is 98 °F (36.7 °C). His blood pressure is 148/82 (abnormal) and his pulse is 74. His respiration is 18 and oxygen saturation is 97%.     Chief Complaint: Eye Problem    Starting yesterday pt is experiencing eye irritation and inflammation. Both eyes are injected and pt reports drainage out of right eye. Denies foreign body sensation. Denies trauma.     Eye Problem   The right eye is affected. This is a new problem. The current episode started yesterday. The problem occurs constantly. The problem has been gradually worsening. The injury mechanism is unknown. The pain is at a severity of 4/10. The pain is mild. There is No known exposure to pink eye. He Does not wear contacts. Associated symptoms include an eye discharge, eye redness and itching. Pertinent negatives include no blurred vision, double vision, fever, nausea or photophobia. He has tried nothing for the symptoms. The treatment provided no relief.       Constitution: Negative for chills, fatigue and fever.   HENT: Negative.     Cardiovascular: Negative.    Eyes:  Positive for eye discharge, eye itching and eye redness. Negative for eye trauma, foreign body in eye, photophobia, double vision and blurred vision.   Respiratory: Negative.     Gastrointestinal:  Negative for nausea.   Musculoskeletal: Negative.    Skin: Negative.    Neurological: Negative.    Psychiatric/Behavioral: Negative.        Objective:     Physical Exam   Constitutional: He is oriented to person, place, and time. He appears well-developed.   HENT:   Head: Normocephalic and atraumatic.   Ears:   Right Ear: External ear normal.   Left Ear: External ear normal.   Nose: Nose normal.   Mouth/Throat: Oropharynx is clear and moist.   Eyes: EOM and lids are normal. Pupils are equal, round, and reactive to light. Right eye exhibits discharge and exudate. Left  eye exhibits discharge and exudate. Right conjunctiva is injected. Left conjunctiva is injected. No scleral icterus. vision grossly intact   Neck: Trachea normal and phonation normal.   Cardiovascular: Normal rate.   Musculoskeletal: Normal range of motion.         General: Normal range of motion.   Neurological: He is alert and oriented to person, place, and time. He displays no weakness.   Skin: Skin is warm, dry and intact.   Psychiatric: His speech is normal and behavior is normal. Mood, judgment and thought content normal.   Nursing note and vitals reviewed.      Assessment:     1. Acute bacterial conjunctivitis of both eyes    2. Irritation of both eyes      Vision Screening    Right eye Left eye Both eyes   Without correction 20/40 20/30 20/30   With correction 20/30 20/25 20/25         Plan:       Acute bacterial conjunctivitis of both eyes  -     ofloxacin (OCUFLOX) 0.3 % ophthalmic solution; Place 1 drop into both eyes 4 (four) times daily. Every 4 hours for 2 days, while awake, then 4 times per day for 5 days for 7 days  Dispense: 10 mL; Refill: 0    Irritation of both eyes      Discussed medication with patient who acknowledges understanding and is agreeable to POC. Follow up with primary care. Increase fluid intake. Red flags for ER discussed.

## 2024-07-22 NOTE — PATIENT INSTRUCTIONS
Eye drops as prescribed.    Eye hygiene to include:  Warm compress to a closed eye 2-3 times a day for 3-5 minutes   Cool compress for relief of irritation    If dry eye occurs you can use over-the-counter hydrating eyedrops.    Follow up with Ophthalmology if symptoms worsen or do not resolve.

## 2024-07-22 NOTE — LETTER
July 22, 2024      [facility]  [address]  [facility] [address]  Phone: [phone]       Patient: Richi Soriano   YOB: 1975  Date of Visit: 07/22/2024    To Whom It May Concern:    Aurea Soriano  was at [facility] on 07/22/2024. The patient may return to work on 07/25/2024 with no restrictions. If you have any questions or concerns, or if I can be of further assistance, please do not hesitate to contact me.    Sincerely,    Deidre Velasquez NP

## 2024-07-24 ENCOUNTER — OFFICE VISIT (OUTPATIENT)
Dept: URGENT CARE | Facility: CLINIC | Age: 49
End: 2024-07-24
Payer: COMMERCIAL

## 2024-07-24 VITALS
TEMPERATURE: 99 F | DIASTOLIC BLOOD PRESSURE: 82 MMHG | HEART RATE: 112 BPM | OXYGEN SATURATION: 96 % | RESPIRATION RATE: 20 BRPM | SYSTOLIC BLOOD PRESSURE: 135 MMHG

## 2024-07-24 DIAGNOSIS — J02.9 SORE THROAT: ICD-10-CM

## 2024-07-24 DIAGNOSIS — U07.1 COVID-19: ICD-10-CM

## 2024-07-24 DIAGNOSIS — U07.1 COVID-19 VIRUS DETECTED: ICD-10-CM

## 2024-07-24 DIAGNOSIS — R52 GENERALIZED BODY ACHES: Primary | ICD-10-CM

## 2024-07-24 LAB
CTP QC/QA: YES
FLUAV AG NPH QL: NEGATIVE
FLUBV AG NPH QL: NEGATIVE
S PYO RRNA THROAT QL PROBE: NEGATIVE
SARS-COV-2 AG RESP QL IA.RAPID: POSITIVE

## 2024-07-24 PROCEDURE — 87880 STREP A ASSAY W/OPTIC: CPT | Mod: QW,,, | Performed by: STUDENT IN AN ORGANIZED HEALTH CARE EDUCATION/TRAINING PROGRAM

## 2024-07-24 PROCEDURE — 99214 OFFICE O/P EST MOD 30 MIN: CPT | Mod: 25,S$GLB,, | Performed by: STUDENT IN AN ORGANIZED HEALTH CARE EDUCATION/TRAINING PROGRAM

## 2024-07-24 PROCEDURE — 87804 INFLUENZA ASSAY W/OPTIC: CPT | Mod: QW,,, | Performed by: STUDENT IN AN ORGANIZED HEALTH CARE EDUCATION/TRAINING PROGRAM

## 2024-07-24 PROCEDURE — 87811 SARS-COV-2 COVID19 W/OPTIC: CPT | Mod: QW,S$GLB,, | Performed by: STUDENT IN AN ORGANIZED HEALTH CARE EDUCATION/TRAINING PROGRAM

## 2024-07-24 RX ORDER — PROMETHAZINE HYDROCHLORIDE AND DEXTROMETHORPHAN HYDROBROMIDE 6.25; 15 MG/5ML; MG/5ML
5 SYRUP ORAL EVERY 4 HOURS PRN
Qty: 118 ML | Refills: 0 | Status: SHIPPED | OUTPATIENT
Start: 2024-07-24 | End: 2024-08-03

## 2024-07-24 NOTE — LETTER
July 24, 2024      [facility]  [address]  [facility] [address]  Phone: [phone]       Patient: Richi Soriano   YOB: 1975  Date of Visit: 07/24/2024    To Whom It May Concern:    Aurea Soriano  was at [facility] on 07/24/2024. The patient may return to work/school on 07/27/2024 with no restrictions. If you have any questions or concerns, or if I can be of further assistance, please do not hesitate to contact me.    Sincerely,    Keo Contreras, NP

## 2024-07-24 NOTE — LETTER
July 26, 2024      [facility]  [address]  [facility] [address]  Phone: [phone]       Patient: Richi Soriano   YOB: 1975  Date of Visit: 07/26/2024    To Whom It May Concern:    Aurea Soriano  was at [facility] on 07/26/2024. The patient may return to work/school on 07/29/2024 with no restrictions. If you have any questions or concerns, or if I can be of further assistance, please do not hesitate to contact me.    Sincerely,    Patsy Crowder MA

## 2024-07-24 NOTE — PROGRESS NOTES
Subjective:      Patient ID: Richi Soriano is a 48 y.o. male.    Vitals:  temperature is 98.8 °F (37.1 °C). His blood pressure is 135/82 and his pulse is 112 (abnormal). His respiration is 20 and oxygen saturation is 96%.     Chief Complaint: URI    Patient is a 48-year-old male past medical history of hypertension, hyperlipidemia, Cevallos, obstructive sleep apnea type 2 diabetes, and diverticulitis who presents to clinic for evaluation of URI related symptoms.  Patient reports he is vaccinated.  Patient reports wife sick with similar symptoms.  Patient reports his symptoms began yesterday.  Patient reports no over-the-counter medications for symptoms at this point.        Constitution: Positive for chills and fatigue.   HENT:  Positive for congestion, postnasal drip and sore throat. Negative for ear pain.    Neck: neck negative.   Cardiovascular: Negative.  Negative for chest pain and palpitations.   Eyes: Negative.    Respiratory:  Positive for cough. Negative for chest tightness, sputum production and shortness of breath.    Gastrointestinal: Negative.  Negative for abdominal pain, nausea, vomiting and diarrhea.   Endocrine: negative.   Genitourinary: Negative.    Musculoskeletal:  Positive for muscle ache.   Skin: Negative.  Negative for color change, pale, rash and erythema.   Allergic/Immunologic: Negative.    Neurological:  Positive for headaches. Negative for dizziness, light-headedness, passing out, disorientation and altered mental status.   Hematologic/Lymphatic: Negative.    Psychiatric/Behavioral: Negative.  Negative for altered mental status, disorientation and confusion.       Objective:     Physical Exam   Constitutional: He is oriented to person, place, and time. He appears well-developed. He is cooperative.  Non-toxic appearance. He does not appear ill. No distress. obesity  HENT:   Head: Normocephalic and atraumatic.   Ears:   Right Ear: Hearing, tympanic membrane, external ear and ear canal  normal.   Left Ear: Hearing, tympanic membrane, external ear and ear canal normal.   Nose: Congestion present. No mucosal edema, rhinorrhea or nasal deformity. No epistaxis. Right sinus exhibits no maxillary sinus tenderness and no frontal sinus tenderness. Left sinus exhibits no maxillary sinus tenderness and no frontal sinus tenderness.   Mouth/Throat: Uvula is midline and mucous membranes are normal. Mucous membranes are moist. No trismus in the jaw. Normal dentition. No uvula swelling. Posterior oropharyngeal erythema present. No oropharyngeal exudate. Oropharynx is clear.   Eyes: Conjunctivae and lids are normal. Pupils are equal, round, and reactive to light. Right eye exhibits no discharge. Left eye exhibits no discharge. No scleral icterus.   Neck: Trachea normal and phonation normal. Neck supple. No neck rigidity present.   Cardiovascular: Regular rhythm and normal pulses. Tachycardia present.   Pulmonary/Chest: Effort normal and breath sounds normal. No respiratory distress (Unlabored.  Equal rise and fall of chest.  Able speak in full complete sentences.  No adventitious breath sounds noted.). He has no wheezes. He has no rhonchi. He has no rales.   Abdominal: Normal appearance and bowel sounds are normal. He exhibits no distension. Soft. There is no abdominal tenderness.   Musculoskeletal: Normal range of motion.         General: Normal range of motion.      Cervical back: He exhibits no tenderness.   Lymphadenopathy:     He has no cervical adenopathy.   Neurological: He is alert and oriented to person, place, and time. He exhibits normal muscle tone.   Skin: Skin is warm, dry, intact, not diaphoretic, not pale and no rash. Capillary refill takes 2 to 3 seconds. No erythema   Psychiatric: His speech is normal and behavior is normal. Judgment and thought content normal.   Nursing note and vitals reviewed.      Assessment:     1. Generalized body aches    2. Sore throat    3. COVID-19        Plan:        Generalized body aches  -     SARS Coronavirus 2 Antigen, POCT Manual Read  -     POCT Influenza A/B Rapid Antigen  -     POCT rapid strep A    Sore throat  -     SARS Coronavirus 2 Antigen, POCT Manual Read  -     POCT Influenza A/B Rapid Antigen  -     POCT rapid strep A    COVID-19    Other orders  -     molnupiravir 200 mg capsule (EUA); Take 4 capsules (800 mg total) by mouth every 12 (twelve) hours. for 5 days  Dispense: 40 capsule; Refill: 0  -     promethazine-dextromethorphan (PROMETHAZINE-DM) 6.25-15 mg/5 mL Syrp; Take 5 mLs by mouth every 4 (four) hours as needed (Cough).  Dispense: 118 mL; Refill: 0                Labs:  COVID positive.  Influenza a and B negative.  Rapid strep negative.  Quarantine as directed.  Quarantine information provided to patient.  COVID recommendations provided.  (Vitamin-C 2000 mg daily, vitamin D3 1000 IU daily, Pepcid 40 mg daily, Zyrtec 10 mg daily, zinc 50 mg daily)  Tylenol per package instructions for any pain or fever.  May rotate with Motrin if unable to control pain or fever along with Tylenol.  Monitor home SpO2 and present to emergency department with readings less than 92%.  Take medications as prescribed.  Assure adequate hydration.  Follow-up with PCP in 1-2 days.  Return to clinic as needed.  To ED for any new or acutely worsening symptoms including but not limited to chest pain, palpitations, shortness of breath, or fever greater than 103° F.  Patient in agreement with plan of care.    DISCLAIMER: Please note that my documentation in this Electronic Healthcare Record was produced using speech recognition software and therefore may contain errors related to that software system.These could include grammar, punctuation and spelling errors or the inclusion/exclusion of phrases that were not intended. Garbled syntax, mangled pronouns, and other bizarre constructions may be attributed to that software system.

## 2024-07-30 ENCOUNTER — HOSPITAL ENCOUNTER (OUTPATIENT)
Facility: HOSPITAL | Age: 49
Discharge: HOME | End: 2024-07-31
Attending: EMERGENCY MEDICINE | Admitting: INTERNAL MEDICINE
Payer: COMMERCIAL

## 2024-07-30 DIAGNOSIS — R07.9 CHEST PAIN: Primary | ICD-10-CM

## 2024-07-30 PROBLEM — E11.65 TYPE 2 DIABETES MELLITUS WITH HYPERGLYCEMIA, WITHOUT LONG-TERM CURRENT USE OF INSULIN: Status: ACTIVE | Noted: 2024-07-30

## 2024-07-30 LAB
ALBUMIN SERPL BCP-MCNC: 4.3 G/DL (ref 3.5–5.2)
ALP SERPL-CCNC: 71 U/L (ref 55–135)
ALT SERPL W/O P-5'-P-CCNC: 41 U/L (ref 10–44)
ANION GAP SERPL CALC-SCNC: 12 MMOL/L (ref 8–16)
AST SERPL-CCNC: 22 U/L (ref 10–40)
BASOPHILS NFR BLD: 0 % (ref 0–1.9)
BILIRUB SERPL-MCNC: 0.7 MG/DL (ref 0.1–1)
BNP SERPL-MCNC: 13 PG/ML (ref 0–99)
BUN SERPL-MCNC: 16 MG/DL (ref 6–20)
CALCIUM SERPL-MCNC: 9.2 MG/DL (ref 8.7–10.5)
CHLORIDE SERPL-SCNC: 102 MMOL/L (ref 95–110)
CO2 SERPL-SCNC: 22 MMOL/L (ref 23–29)
CREAT SERPL-MCNC: 0.8 MG/DL (ref 0.5–1.4)
D DIMER PPP IA.FEU-MCNC: 0.19 MG/L FEU (ref 0–0.49)
DIFFERENTIAL METHOD BLD: ABNORMAL
EOSINOPHIL NFR BLD: 3 % (ref 0–8)
ERYTHROCYTE [DISTWIDTH] IN BLOOD BY AUTOMATED COUNT: 13.1 % (ref 11.5–14.5)
EST. GFR  (NO RACE VARIABLE): >60 ML/MIN/1.73 M^2
GLUCOSE SERPL-MCNC: 101 MG/DL (ref 70–110)
GLUCOSE SERPL-MCNC: 221 MG/DL (ref 70–110)
HCT VFR BLD AUTO: 42.4 % (ref 40–54)
HGB BLD-MCNC: 13.9 G/DL (ref 14–18)
IMM GRANULOCYTES # BLD AUTO: ABNORMAL K/UL (ref 0–0.04)
IMM GRANULOCYTES NFR BLD AUTO: ABNORMAL % (ref 0–0.5)
INR PPP: 0.9 (ref 0.8–1.2)
LYMPHOCYTES NFR BLD: 25 % (ref 18–48)
MAGNESIUM SERPL-MCNC: 1.8 MG/DL (ref 1.6–2.6)
MCH RBC QN AUTO: 29.9 PG (ref 27–31)
MCHC RBC AUTO-ENTMCNC: 32.8 G/DL (ref 32–36)
MCV RBC AUTO: 91 FL (ref 82–98)
METAMYELOCYTES NFR BLD MANUAL: 2 %
MONOCYTES NFR BLD: 11 % (ref 4–15)
NEUTROPHILS NFR BLD: 56 % (ref 38–73)
NEUTS BAND NFR BLD MANUAL: 3 %
NRBC BLD-RTO: 0 /100 WBC
OHS QRS DURATION: 96 MS
OHS QTC CALCULATION: 437 MS
PLATELET # BLD AUTO: 180 K/UL (ref 150–450)
PLATELET BLD QL SMEAR: ABNORMAL
PMV BLD AUTO: 11 FL (ref 9.2–12.9)
POTASSIUM SERPL-SCNC: 4.2 MMOL/L (ref 3.5–5.1)
PROT SERPL-MCNC: 6.9 G/DL (ref 6–8.4)
PROTHROMBIN TIME: 10.5 SEC (ref 9–12.5)
RBC # BLD AUTO: 4.65 M/UL (ref 4.6–6.2)
SARS-COV-2 RDRP RESP QL NAA+PROBE: POSITIVE
SODIUM SERPL-SCNC: 136 MMOL/L (ref 136–145)
TROPONIN I SERPL HS-MCNC: 2.7 PG/ML (ref 0–14.9)
TROPONIN I SERPL HS-MCNC: 3.9 PG/ML (ref 0–14.9)
WBC # BLD AUTO: 8.08 K/UL (ref 3.9–12.7)

## 2024-07-30 PROCEDURE — 99900035 HC TECH TIME PER 15 MIN (STAT)

## 2024-07-30 PROCEDURE — 83735 ASSAY OF MAGNESIUM: CPT | Performed by: EMERGENCY MEDICINE

## 2024-07-30 PROCEDURE — G0378 HOSPITAL OBSERVATION PER HR: HCPCS

## 2024-07-30 PROCEDURE — 94799 UNLISTED PULMONARY SVC/PX: CPT

## 2024-07-30 PROCEDURE — 25000242 PHARM REV CODE 250 ALT 637 W/ HCPCS: Performed by: EMERGENCY MEDICINE

## 2024-07-30 PROCEDURE — 85379 FIBRIN DEGRADATION QUANT: CPT | Performed by: EMERGENCY MEDICINE

## 2024-07-30 PROCEDURE — 80053 COMPREHEN METABOLIC PANEL: CPT | Performed by: EMERGENCY MEDICINE

## 2024-07-30 PROCEDURE — 63600175 PHARM REV CODE 636 W HCPCS: Performed by: NURSE PRACTITIONER

## 2024-07-30 PROCEDURE — 25000003 PHARM REV CODE 250: Performed by: NURSE PRACTITIONER

## 2024-07-30 PROCEDURE — 85610 PROTHROMBIN TIME: CPT | Performed by: EMERGENCY MEDICINE

## 2024-07-30 PROCEDURE — 96372 THER/PROPH/DIAG INJ SC/IM: CPT | Performed by: NURSE PRACTITIONER

## 2024-07-30 PROCEDURE — 85027 COMPLETE CBC AUTOMATED: CPT | Performed by: EMERGENCY MEDICINE

## 2024-07-30 PROCEDURE — 85007 BL SMEAR W/DIFF WBC COUNT: CPT | Performed by: EMERGENCY MEDICINE

## 2024-07-30 PROCEDURE — 94660 CPAP INITIATION&MGMT: CPT

## 2024-07-30 PROCEDURE — 84484 ASSAY OF TROPONIN QUANT: CPT | Mod: 91 | Performed by: EMERGENCY MEDICINE

## 2024-07-30 PROCEDURE — 94761 N-INVAS EAR/PLS OXIMETRY MLT: CPT

## 2024-07-30 PROCEDURE — 83880 ASSAY OF NATRIURETIC PEPTIDE: CPT | Performed by: EMERGENCY MEDICINE

## 2024-07-30 PROCEDURE — 25000003 PHARM REV CODE 250: Performed by: EMERGENCY MEDICINE

## 2024-07-30 PROCEDURE — 99285 EMERGENCY DEPT VISIT HI MDM: CPT | Mod: 25

## 2024-07-30 PROCEDURE — U0002 COVID-19 LAB TEST NON-CDC: HCPCS | Performed by: EMERGENCY MEDICINE

## 2024-07-30 RX ORDER — LOSARTAN POTASSIUM 50 MG/1
50 TABLET ORAL DAILY
Status: DISCONTINUED | OUTPATIENT
Start: 2024-07-30 | End: 2024-07-31 | Stop reason: HOSPADM

## 2024-07-30 RX ORDER — GLUCAGON 1 MG
1 KIT INJECTION
Status: DISCONTINUED | OUTPATIENT
Start: 2024-07-30 | End: 2024-07-31 | Stop reason: HOSPADM

## 2024-07-30 RX ORDER — NITROGLYCERIN 0.4 MG/1
0.4 TABLET SUBLINGUAL EVERY 5 MIN PRN
Status: DISCONTINUED | OUTPATIENT
Start: 2024-07-30 | End: 2024-07-31 | Stop reason: HOSPADM

## 2024-07-30 RX ORDER — SODIUM,POTASSIUM PHOSPHATES 280-250MG
2 POWDER IN PACKET (EA) ORAL
Status: DISCONTINUED | OUTPATIENT
Start: 2024-07-30 | End: 2024-07-31 | Stop reason: HOSPADM

## 2024-07-30 RX ORDER — ONDANSETRON HYDROCHLORIDE 2 MG/ML
4 INJECTION, SOLUTION INTRAVENOUS EVERY 6 HOURS PRN
Status: DISCONTINUED | OUTPATIENT
Start: 2024-07-30 | End: 2024-07-31 | Stop reason: HOSPADM

## 2024-07-30 RX ORDER — ATORVASTATIN CALCIUM 20 MG/1
20 TABLET, FILM COATED ORAL NIGHTLY
Status: DISCONTINUED | OUTPATIENT
Start: 2024-07-30 | End: 2024-07-31 | Stop reason: HOSPADM

## 2024-07-30 RX ORDER — ASPIRIN 325 MG
325 TABLET ORAL
Status: COMPLETED | OUTPATIENT
Start: 2024-07-30 | End: 2024-07-30

## 2024-07-30 RX ORDER — HYDROCODONE BITARTRATE AND ACETAMINOPHEN 5; 325 MG/1; MG/1
1 TABLET ORAL EVERY 6 HOURS PRN
Status: DISCONTINUED | OUTPATIENT
Start: 2024-07-30 | End: 2024-07-31 | Stop reason: HOSPADM

## 2024-07-30 RX ORDER — ACETAMINOPHEN 325 MG/1
650 TABLET ORAL EVERY 8 HOURS PRN
Status: DISCONTINUED | OUTPATIENT
Start: 2024-07-30 | End: 2024-07-31 | Stop reason: HOSPADM

## 2024-07-30 RX ORDER — AMOXICILLIN 250 MG
1 CAPSULE ORAL DAILY
Status: DISCONTINUED | OUTPATIENT
Start: 2024-07-30 | End: 2024-07-31 | Stop reason: HOSPADM

## 2024-07-30 RX ORDER — ALUMINUM HYDROXIDE, MAGNESIUM HYDROXIDE, AND SIMETHICONE 1200; 120; 1200 MG/30ML; MG/30ML; MG/30ML
30 SUSPENSION ORAL 4 TIMES DAILY PRN
Status: DISCONTINUED | OUTPATIENT
Start: 2024-07-30 | End: 2024-07-31 | Stop reason: HOSPADM

## 2024-07-30 RX ORDER — IBUPROFEN 200 MG
24 TABLET ORAL
Status: DISCONTINUED | OUTPATIENT
Start: 2024-07-30 | End: 2024-07-31 | Stop reason: HOSPADM

## 2024-07-30 RX ORDER — LANOLIN ALCOHOL/MO/W.PET/CERES
800 CREAM (GRAM) TOPICAL
Status: DISCONTINUED | OUTPATIENT
Start: 2024-07-30 | End: 2024-07-31 | Stop reason: HOSPADM

## 2024-07-30 RX ORDER — IBUPROFEN 200 MG
16 TABLET ORAL
Status: DISCONTINUED | OUTPATIENT
Start: 2024-07-30 | End: 2024-07-31 | Stop reason: HOSPADM

## 2024-07-30 RX ORDER — ENOXAPARIN SODIUM 100 MG/ML
40 INJECTION SUBCUTANEOUS EVERY 12 HOURS
Status: DISCONTINUED | OUTPATIENT
Start: 2024-07-30 | End: 2024-07-31 | Stop reason: HOSPADM

## 2024-07-30 RX ORDER — ACETAMINOPHEN 325 MG/1
650 TABLET ORAL EVERY 4 HOURS PRN
Status: DISCONTINUED | OUTPATIENT
Start: 2024-07-30 | End: 2024-07-31 | Stop reason: HOSPADM

## 2024-07-30 RX ORDER — SODIUM CHLORIDE 0.9 % (FLUSH) 0.9 %
10 SYRINGE (ML) INJECTION EVERY 12 HOURS PRN
Status: DISCONTINUED | OUTPATIENT
Start: 2024-07-30 | End: 2024-07-31 | Stop reason: HOSPADM

## 2024-07-30 RX ORDER — NALOXONE HCL 0.4 MG/ML
0.02 VIAL (ML) INJECTION
Status: DISCONTINUED | OUTPATIENT
Start: 2024-07-30 | End: 2024-07-31 | Stop reason: HOSPADM

## 2024-07-30 RX ORDER — TALC
6 POWDER (GRAM) TOPICAL NIGHTLY PRN
Status: DISCONTINUED | OUTPATIENT
Start: 2024-07-30 | End: 2024-07-31 | Stop reason: HOSPADM

## 2024-07-30 RX ADMIN — Medication 800 MG: at 08:07

## 2024-07-30 RX ADMIN — NITROGLYCERIN 0.4 MG: 0.4 TABLET SUBLINGUAL at 07:07

## 2024-07-30 RX ADMIN — LOSARTAN POTASSIUM 50 MG: 50 TABLET, FILM COATED ORAL at 04:07

## 2024-07-30 RX ADMIN — ATORVASTATIN CALCIUM 20 MG: 20 TABLET, FILM COATED ORAL at 08:07

## 2024-07-30 RX ADMIN — ASPIRIN 325 MG ORAL TABLET 325 MG: 325 PILL ORAL at 07:07

## 2024-07-30 RX ADMIN — Medication 800 MG: at 11:07

## 2024-07-30 RX ADMIN — ENOXAPARIN SODIUM 40 MG: 40 INJECTION SUBCUTANEOUS at 08:07

## 2024-07-30 RX ADMIN — ENOXAPARIN SODIUM 40 MG: 40 INJECTION SUBCUTANEOUS at 11:07

## 2024-07-30 RX ADMIN — STANDARDIZED SENNA CONCENTRATE AND DOCUSATE SODIUM 1 TABLET: 8.6; 5 TABLET, FILM COATED ORAL at 11:07

## 2024-07-30 NOTE — SUBJECTIVE & OBJECTIVE
Past Medical History:   Diagnosis Date    Diabetes mellitus, type 2     Diverticulitis of colon     Hyperlipidemia     Hypertension     Sleep apnea        Past Surgical History:   Procedure Laterality Date    ABDOMINAL SURGERY      APPENDECTOMY      COLON SURGERY      HERNIA REPAIR         Review of patient's allergies indicates:  No Known Allergies    No current facility-administered medications on file prior to encounter.     Current Outpatient Medications on File Prior to Encounter   Medication Sig    atorvastatin (LIPITOR) 20 MG tablet Take 1 tablet (20 mg total) by mouth once daily.    cholecalciferol, vitamin D3, 1,250 mcg (50,000 unit) capsule Take 1 capsule by mouth every 7 days. MONDAYS    empagliflozin (JARDIANCE) 25 mg tablet Take 1 tablet (25 mg total) by mouth once daily.    fenofibrate 160 MG Tab Take 1 tablet (160 mg total) by mouth once daily.    glipiZIDE (GLUCOTROL) 10 MG TR24 TAKE 1 TABLET(10 MG) BY MOUTH DAILY WITH BREAKFAST (Patient taking differently: Take 10 mg by mouth daily with breakfast. TAKE 1 TABLET(10 MG) BY MOUTH DAILY WITH BREAKFAST)    losartan (COZAAR) 50 MG tablet Take 1 tablet (50 mg total) by mouth once daily.    metFORMIN (GLUCOPHAGE-XR) 750 MG ER 24hr tablet TAKE 2 TABLETS(1500 MG) BY MOUTH DAILY WITH BREAKFAST (Patient taking differently: Take 1,500 mg by mouth every evening. TAKE 2 TABLETS(1500 MG) BY MOUTH DAILY WITH BREAKFAST)    tirzepatide (MOUNJARO) 7.5 mg/0.5 mL PnIj ADMINISTER 7.5 MG UNDER THE SKIN EVERY 7 DAYS (Patient taking differently: Inject 7.5 mg into the skin once a week.)    blood sugar diagnostic Strp OneTouch Verio test strips   TEST BLOOD SUGAR BID AND PRN    lancets 33 gauge Misc OneTouch Delica Plus Lancet 33 gauge   USE TWICE DAILY AS DIRECTED AND AS NEEDED FOR BLOOD GLUCOSE TESTING    ONETOUCH DELICA PLUS LANCET 33 gauge Misc Apply topically 2 (two) times daily as needed.    ONETOUCH VERIO TEST STRIPS Strp 2 (two) times daily as needed.     promethazine-dextromethorphan (PROMETHAZINE-DM) 6.25-15 mg/5 mL Syrp Take 5 mLs by mouth every 4 (four) hours as needed (Cough). (Patient not taking: Reported on 7/30/2024)    [DISCONTINUED] ibuprofen (ADVIL,MOTRIN) 800 MG tablet Take 800 mg by mouth every 6 (six) hours as needed for Pain or Temperature greater than.    [DISCONTINUED] molnupiravir 200 mg capsule (EUA) Take 4 capsules (800 mg total) by mouth every 12 (twelve) hours. for 5 days    [DISCONTINUED] ofloxacin (OCUFLOX) 0.3 % ophthalmic solution Place 1 drop into both eyes 4 (four) times daily. Every 4 hours for 2 days, while awake, then 4 times per day for 5 days for 7 days     Family History    Family history is unknown by patient.       Tobacco Use    Smoking status: Former     Current packs/day: 0.50     Average packs/day: 0.5 packs/day for 10.0 years (5.0 ttl pk-yrs)     Types: Cigarettes     Passive exposure: Past    Smokeless tobacco: Never   Substance and Sexual Activity    Alcohol use: No     Comment: ocassionally    Drug use: No    Sexual activity: Yes     Partners: Female     Review of Systems   Constitutional:  Positive for fatigue.   HENT:  Negative for postnasal drip and rhinorrhea.    Respiratory:  Positive for cough.    Cardiovascular:  Positive for chest pain.   Gastrointestinal:  Negative for diarrhea, nausea and vomiting.   Musculoskeletal:  Negative for arthralgias and back pain.     Objective:     Vital Signs (Most Recent):  Temp: 98.3 °F (36.8 °C) (07/30/24 1530)  Pulse: 61 (07/30/24 1530)  Resp: 17 (07/30/24 1530)  BP: 116/61 (07/30/24 1530)  SpO2: 97 % (07/30/24 1530) Vital Signs (24h Range):  Temp:  [97.8 °F (36.6 °C)-98.3 °F (36.8 °C)] 98.3 °F (36.8 °C)  Pulse:  [61-86] 61  Resp:  [12-20] 17  SpO2:  [95 %-99 %] 97 %  BP: (111-156)/(57-83) 116/61     Weight: (!) 158.8 kg (350 lb)  Body mass index is 51.69 kg/m².     Physical Exam  Constitutional:       Appearance: Normal appearance. He is obese. He is not ill-appearing.    Cardiovascular:      Rate and Rhythm: Normal rate and regular rhythm.   Pulmonary:      Effort: Pulmonary effort is normal.   Abdominal:      Palpations: Abdomen is soft.      Comments: Protuberant    Neurological:      General: No focal deficit present.      Mental Status: He is alert and oriented to person, place, and time.                Significant Labs: All pertinent labs within the past 24 hours have been reviewed.  CBC:   Recent Labs   Lab 07/30/24  0615   WBC 8.08   HGB 13.9*   HCT 42.4        CMP:   Recent Labs   Lab 07/30/24  0615      K 4.2      CO2 22*   *   BUN 16   CREATININE 0.8   CALCIUM 9.2   PROT 6.9   ALBUMIN 4.3   BILITOT 0.7   ALKPHOS 71   AST 22   ALT 41   ANIONGAP 12     Cardiac Markers:   Recent Labs   Lab 07/30/24  0615   BNP 13       Significant Imaging: I have reviewed all pertinent imaging results/findings within the past 24 hours.

## 2024-07-30 NOTE — NURSING
Nurses Note -- 4 Eyes      7/30/2024   4:25 PM      Skin assessed during: Admit      [x] No Altered Skin Integrity Present    []Prevention Measures Documented      [] Yes- Altered Skin Integrity Present or Discovered   [] LDA Added if Not in Epic (Describe Wound)   [] New Altered Skin Integrity was Present on Admit and Documented in LDA   [] Wound Image Taken    Wound Care Consulted? no    Attending Nurse:  Ernestine Mireles RN/Staff Member:   NADIR Key RN

## 2024-07-30 NOTE — ASSESSMENT & PLAN NOTE
"Patient's FSGs are uncontrolled due to hyperglycemia on current medication regimen.  Last A1c reviewed-   Lab Results   Component Value Date    HGBA1C 7.0 (H) 12/19/2023     Most recent fingerstick glucose reviewed- No results for input(s): "POCTGLUCOSE" in the last 24 hours.  Current correctional scale  stable  Maintain anti-hyperglycemic dose as follows-  hold p.o. diabetic medications  Antihyperglycemics (From admission, onward)      None          Hold Oral hypoglycemics while patient is in the hospital.      "

## 2024-07-30 NOTE — PLAN OF CARE
Problem: Adult Inpatient Plan of Care  Goal: Plan of Care Review  Outcome: Progressing  Goal: Patient-Specific Goal (Individualized)  Outcome: Progressing  Goal: Absence of Hospital-Acquired Illness or Injury  Outcome: Progressing  Goal: Optimal Comfort and Wellbeing  Outcome: Progressing  Goal: Readiness for Transition of Care  Outcome: Progressing     Problem: Bariatric Environmental Safety  Goal: Safety Maintained with Care  Outcome: Progressing     Problem: Diabetes Comorbidity  Goal: Blood Glucose Level Within Targeted Range  Outcome: Progressing      Partially impaired: cannot see medication labels or newsprint, but can see obstacles in path, and the surrounding layout; can count fingers at arm's length

## 2024-07-30 NOTE — ED PROVIDER NOTES
"Encounter Date: 7/30/2024       History     Chief Complaint   Patient presents with    Chest Pain     Midsternal "pressure" intermittent x2days. Minimal SOB. Denies n/v/d.Covid + x6days ago.      48-year-old male who has a history of type 2 diabetes, hyperlipidemia, hypertension, sleep apnea, presents with complaints of midsternal chest pain describes as pressure-like in nature has been present for couple days off and on.  Patient states that the symptoms are worse when he is active and best at rest.  He has denied any nausea vomiting or diaphoresis but has had shortness of breath.  Admits that the pain radiates to his back.  He has no neck or arm discomfort.  The patient has an occasional cough productive of white sputum and he does not smoke or drink.  He was diagnosed with COVID 1 week ago but has not been tested again determine whether not he is now negative.  He admits that his diabetes has been relatively well controlled with his last blood sugar 147 yesterday.  He has no drug allergies.  Has no history of anemia.  The patient does admit that possibly his pain is worse with inspiration but denies any trouble swallowing.      Review of patient's allergies indicates:  No Known Allergies  Past Medical History:   Diagnosis Date    Diabetes mellitus, type 2     Diverticulitis of colon     Hyperlipidemia     Hypertension     Sleep apnea      Past Surgical History:   Procedure Laterality Date    ABDOMINAL SURGERY      APPENDECTOMY      COLON SURGERY      HERNIA REPAIR       Family History   Family history unknown: Yes     Social History     Tobacco Use    Smoking status: Former     Current packs/day: 0.50     Average packs/day: 0.5 packs/day for 10.0 years (5.0 ttl pk-yrs)     Types: Cigarettes     Passive exposure: Past    Smokeless tobacco: Never   Substance Use Topics    Alcohol use: No     Comment: ocassionally    Drug use: No     Review of Systems   Constitutional:  Positive for activity change. Negative for " chills, diaphoresis and fever.   HENT:  Negative for congestion.    Respiratory:  Positive for chest tightness and shortness of breath. Negative for cough.    Cardiovascular:  Positive for chest pain. Negative for leg swelling.   Gastrointestinal:  Negative for abdominal pain, nausea and vomiting.   Genitourinary:  Negative for difficulty urinating.   Musculoskeletal:  Positive for back pain.   Skin:  Negative for pallor and rash.   All other systems reviewed and are negative.      Physical Exam     Initial Vitals [07/30/24 0558]   BP Pulse Resp Temp SpO2   132/66 86 19 97.8 °F (36.6 °C) 99 %      MAP       --         Physical Exam    Vitals reviewed.  Constitutional: He appears well-developed and well-nourished. He is not diaphoretic. No distress.   HENT:   Head: Normocephalic and atraumatic.   Right Ear: External ear normal.   Left Ear: External ear normal.   Nose: Nose normal.   Mouth/Throat: Oropharynx is clear and moist.   Eyes: Conjunctivae and EOM are normal. Pupils are equal, round, and reactive to light.   Neck: Neck supple. No JVD present.   Normal range of motion.  Cardiovascular:  Normal rate, regular rhythm, normal heart sounds and intact distal pulses.     Exam reveals no gallop and no friction rub.       No murmur heard.  Pulmonary/Chest: Breath sounds normal. No respiratory distress. He has no wheezes. He has no rhonchi. He has no rales. He exhibits no tenderness.   Abdominal: Abdomen is soft. Bowel sounds are normal. He exhibits no distension. There is no abdominal tenderness. There is no rebound and no guarding.   Musculoskeletal:         General: No tenderness or edema. Normal range of motion.      Cervical back: Normal range of motion and neck supple.     Lymphadenopathy:     He has no cervical adenopathy.   Neurological: He is alert and oriented to person, place, and time. He has normal strength. GCS score is 15. GCS eye subscore is 4. GCS verbal subscore is 5. GCS motor subscore is 6.   Skin:  Skin is warm and dry. Capillary refill takes less than 2 seconds. No rash noted. No erythema. No pallor.   Psychiatric: He has a normal mood and affect. His behavior is normal. Judgment and thought content normal.         ED Course   Procedures  Labs Reviewed   CBC W/ AUTO DIFFERENTIAL - Abnormal       Result Value    WBC 8.08      RBC 4.65      Hemoglobin 13.9 (*)     Hematocrit 42.4      MCV 91      MCH 29.9      MCHC 32.8      RDW 13.1      Platelets 180      MPV 11.0      Immature Granulocytes CANCELED      Immature Grans (Abs) CANCELED      nRBC 0      Gran % 56.0      Lymph % 25.0      Mono % 11.0      Eosinophil % 3.0      Basophil % 0.0      Bands 3.0      Metamyelocytes 2.0      Platelet Estimate Appears normal      Differential Method Manual     COMPREHENSIVE METABOLIC PANEL - Abnormal    Sodium 136      Potassium 4.2      Chloride 102      CO2 22 (*)     Glucose 221 (*)     BUN 16      Creatinine 0.8      Calcium 9.2      Total Protein 6.9      Albumin 4.3      Total Bilirubin 0.7      Alkaline Phosphatase 71      AST 22      ALT 41      eGFR >60.0      Anion Gap 12     B-TYPE NATRIURETIC PEPTIDE    BNP 13     MAGNESIUM    Magnesium 1.8     TROPONIN I HIGH SENSITIVITY    Troponin I High Sensitivity 3.9     PROTIME-INR    Prothrombin Time 10.5      INR 0.9     D DIMER, QUANTITATIVE   D DIMER, QUANTITATIVE    D-Dimer 0.19     SARS-COV-2 RNA AMPLIFICATION, QUAL   TROPONIN I HIGH SENSITIVITY        ECG Results              EKG 12-lead (In process)        Collection Time Result Time QRS Duration OHS QTC Calculation    07/30/24 06:03:26 07/30/24 06:14:51 96 437                     In process by Interface, Lab In [facility] (07/30/24 06:14:57)                   Narrative:    Test Reason : R07.9,    Vent. Rate : 083 BPM     Atrial Rate : 083 BPM     P-R Int : 178 ms          QRS Dur : 096 ms      QT Int : 372 ms       P-R-T Axes : 051 -34 072 degrees     QTc Int : 437 ms    Normal sinus rhythm  Left axis  deviation  Minimal voltage criteria for LVH, may be normal variant ( R in aVL )  Cannot rule out Anterior infarct ,age undetermined  Abnormal ECG  When compared with ECG of 17-AUG-2022 21:21,  No significant change was found    Referred By: [email or web address]   SELF           Confirmed By:                                   Imaging Results              X-Ray Chest AP Portable (Final result)  Result time 07/30/24 07:06:13      Final result by Moises Foley DO (07/30/24 07:06:13)                   Impression:      No acute cardiopulmonary abnormality.      Electronically signed by: Moises Foley  Date:    07/30/2024  Time:    07:06               Narrative:    EXAMINATION:  XR CHEST AP PORTABLE    CLINICAL HISTORY:  chest pain;    FINDINGS:  Portable chest with comparison chest 8/17/2022.  Normal cardiomediastinal silhouette.Lungs are clear. Pulmonary vasculature is normal. No acute osseous abnormality.                                       Medications   nitroGLYCERIN SL tablet 0.4 mg (0.4 mg Sublingual Given 7/30/24 0742)   aspirin tablet 325 mg (325 mg Oral Given 7/30/24 0734)     Medical Decision Making  Risk  OTC drugs.  Prescription drug management.              Attending Attestation:             Attending ED Notes:   ED course and MDM:  This 48-year-old male who has a history of diabetes, hypertension hyperlipidemia, presented with complaints of having chest pain off and on for the last couple days he describes as pressure-like in nature.  The patient's EKG showed a sinus rhythm with some mild LVH and a left axis.  Patient's chest x-ray is unremarkable.  Screening labs reveal an initial normal troponin normal D-dimer.  Other remaining labs were reviewed.  During the ED course the patient was given aspirin and received 2 sublingual nitroglycerin with relief of his pain.  Clinically the patient was presentation is varus suggestive of angina new onset.  Hospital Medicine is being consulted for admission and ultimately the  patient will require a Cardiology consult.    Differential diagnosis includes NSTEMI, STEMI, angina, chest wall pain, costochondritis, PE, pericarditis                             Clinical Impression:  Final diagnoses:  [R07.9] Chest pain          ED Disposition Condition    Observation Stable                Omar Up Jr., MD  07/30/24 0923

## 2024-07-30 NOTE — H&P
"[facility] Medicine  History & Physical    Patient Name: Richi Soriano  MRN: [id number]  Patient Class: OP- Observation  Admission Date: 7/30/2024  Attending Physician: Rachelle San NP  Primary Care Provider: Nancy Hollis FNP-C         Patient information was obtained from patient and ER records.     Subjective:     Principal Problem:Chest pain    Chief Complaint:   Chief Complaint   Patient presents with    Chest Pain     Midsternal "pressure" intermittent x2days. Minimal SOB. Denies n/v/d.Covid + x6days ago.         HPI: Richi Soriano is 48-year-old male who has a history of type 2 diabetes, hyperlipidemia, hypertension, and sleep apnea who presents to the ED for evaluation of midsternal chest pain which he did describes as pressure-like intermittent for 2 days.  He reports increased chest pain with exertion.  Denies associated nausea vomiting or abdominal pain.  He reports occasional productive cough with white sputum.  He was diagnosed with COVID-19 on 07/24/2024.  According to the ED note, Patient was given nitroglycerin x2 with improvement of his chest pain.  Patient states he is followed by Dr. Patel at [facility].    Labs reviewed.  CBC CMP unremarkable.  Cardiac high sensitivity troponin negative x2.  EKG NSR with no signs of ischemia.    Patient underwent cardiac stress test in November of 2022 which is negative for myocardial ischemia.  Echocardiogram completed in January of 2023 which demonstrates normal left ventricular function with mild diastolic dysfunction.  Patient able to pull up results and his I phone.    Past Medical History:   Diagnosis Date    Diabetes mellitus, type 2     Diverticulitis of colon     Hyperlipidemia     Hypertension     Sleep apnea        Past Surgical History:   Procedure Laterality Date    ABDOMINAL SURGERY      APPENDECTOMY      COLON SURGERY      HERNIA REPAIR         Review of patient's allergies indicates:  No " Known Allergies    No current facility-administered medications on file prior to encounter.     Current Outpatient Medications on File Prior to Encounter   Medication Sig    atorvastatin (LIPITOR) 20 MG tablet Take 1 tablet (20 mg total) by mouth once daily.    cholecalciferol, vitamin D3, 1,250 mcg (50,000 unit) capsule Take 1 capsule by mouth every 7 days. MONDAYS    empagliflozin (JARDIANCE) 25 mg tablet Take 1 tablet (25 mg total) by mouth once daily.    fenofibrate 160 MG Tab Take 1 tablet (160 mg total) by mouth once daily.    glipiZIDE (GLUCOTROL) 10 MG TR24 TAKE 1 TABLET(10 MG) BY MOUTH DAILY WITH BREAKFAST (Patient taking differently: Take 10 mg by mouth daily with breakfast. TAKE 1 TABLET(10 MG) BY MOUTH DAILY WITH BREAKFAST)    losartan (COZAAR) 50 MG tablet Take 1 tablet (50 mg total) by mouth once daily.    metFORMIN (GLUCOPHAGE-XR) 750 MG ER 24hr tablet TAKE 2 TABLETS(1500 MG) BY MOUTH DAILY WITH BREAKFAST (Patient taking differently: Take 1,500 mg by mouth every evening. TAKE 2 TABLETS(1500 MG) BY MOUTH DAILY WITH BREAKFAST)    tirzepatide (MOUNJARO) 7.5 mg/0.5 mL PnIj ADMINISTER 7.5 MG UNDER THE SKIN EVERY 7 DAYS (Patient taking differently: Inject 7.5 mg into the skin once a week.)    blood sugar diagnostic Strp OneTouch Verio test strips   TEST BLOOD SUGAR BID AND PRN    lancets 33 gauge Misc OneTouch Delica Plus Lancet 33 gauge   USE TWICE DAILY AS DIRECTED AND AS NEEDED FOR BLOOD GLUCOSE TESTING    ONETOUCH DELICA PLUS LANCET 33 gauge Misc Apply topically 2 (two) times daily as needed.    ONETOUCH VERIO TEST STRIPS Strp 2 (two) times daily as needed.    promethazine-dextromethorphan (PROMETHAZINE-DM) 6.25-15 mg/5 mL Syrp Take 5 mLs by mouth every 4 (four) hours as needed (Cough). (Patient not taking: Reported on 7/30/2024)    [DISCONTINUED] ibuprofen (ADVIL,MOTRIN) 800 MG tablet Take 800 mg by mouth every 6 (six) hours as needed for Pain or Temperature greater than.    [DISCONTINUED] molnupiravir  200 mg capsule (EUA) Take 4 capsules (800 mg total) by mouth every 12 (twelve) hours. for 5 days    [DISCONTINUED] ofloxacin (OCUFLOX) 0.3 % ophthalmic solution Place 1 drop into both eyes 4 (four) times daily. Every 4 hours for 2 days, while awake, then 4 times per day for 5 days for 7 days     Family History    Family history is unknown by patient.       Tobacco Use    Smoking status: Former     Current packs/day: 0.50     Average packs/day: 0.5 packs/day for 10.0 years (5.0 ttl pk-yrs)     Types: Cigarettes     Passive exposure: Past    Smokeless tobacco: Never   Substance and Sexual Activity    Alcohol use: No     Comment: ocassionally    Drug use: No    Sexual activity: Yes     Partners: Female     Review of Systems   Constitutional:  Positive for fatigue.   HENT:  Negative for postnasal drip and rhinorrhea.    Respiratory:  Positive for cough.    Cardiovascular:  Positive for chest pain.   Gastrointestinal:  Negative for diarrhea, nausea and vomiting.   Musculoskeletal:  Negative for arthralgias and back pain.     Objective:     Vital Signs (Most Recent):  Temp: 98.3 °F (36.8 °C) (07/30/24 1530)  Pulse: 61 (07/30/24 1530)  Resp: 17 (07/30/24 1530)  BP: 116/61 (07/30/24 1530)  SpO2: 97 % (07/30/24 1530) Vital Signs (24h Range):  Temp:  [97.8 °F (36.6 °C)-98.3 °F (36.8 °C)] 98.3 °F (36.8 °C)  Pulse:  [61-86] 61  Resp:  [12-20] 17  SpO2:  [95 %-99 %] 97 %  BP: (111-156)/(57-83) 116/61     Weight: (!) 158.8 kg (350 lb)  Body mass index is 51.69 kg/m².     Physical Exam  Constitutional:       Appearance: Normal appearance. He is obese. He is not ill-appearing.   Cardiovascular:      Rate and Rhythm: Normal rate and regular rhythm.   Pulmonary:      Effort: Pulmonary effort is normal.   Abdominal:      Palpations: Abdomen is soft.      Comments: Protuberant    Neurological:      General: No focal deficit present.      Mental Status: He is alert and oriented to person, place, and time.                Significant  "Labs: All pertinent labs within the past 24 hours have been reviewed.  CBC:   Recent Labs   Lab 07/30/24  0615   WBC 8.08   HGB 13.9*   HCT 42.4        CMP:   Recent Labs   Lab 07/30/24  0615      K 4.2      CO2 22*   *   BUN 16   CREATININE 0.8   CALCIUM 9.2   PROT 6.9   ALBUMIN 4.3   BILITOT 0.7   ALKPHOS 71   AST 22   ALT 41   ANIONGAP 12     Cardiac Markers:   Recent Labs   Lab 07/30/24  0615   BNP 13       Significant Imaging: I have reviewed all pertinent imaging results/findings within the past 24 hours.  Assessment/Plan:     * Chest pain    Patient with chest pain on exertion.  Relieved with nitroglycerin x2 in the ER.  Heart score 4.  Discussed with ED provider and will consult cardiologist.   Stress test in 2022 is negative for myocardial ischemia.  Spoke with nurse at Dr. Patel office.    Type 2 diabetes mellitus with hyperglycemia, without long-term current use of insulin  Patient's FSGs are uncontrolled due to hyperglycemia on current medication regimen.  Last A1c reviewed-   Lab Results   Component Value Date    HGBA1C 7.0 (H) 12/19/2023     Most recent fingerstick glucose reviewed- No results for input(s): "POCTGLUCOSE" in the last 24 hours.  Current correctional scale  stable  Maintain anti-hyperglycemic dose as follows-  hold p.o. diabetic medications  Antihyperglycemics (From admission, onward)      None          Hold Oral hypoglycemics while patient is in the hospital.        Hyperlipidemia   Patient is chronically on statin.will continue for now. Monitor clinically. Last LDL was No results found for: "LDLCALC"        Hypertension  Chronic, controlled. Latest blood pressure and vitals reviewed-     Temp:  [97.8 °F (36.6 °C)-98.3 °F (36.8 °C)]   Pulse:  [61-86]   Resp:  [12-20]   BP: (111-156)/(57-83)   SpO2:  [95 %-99 %] .   Home meds for hypertension were reviewed and noted below.   Hypertension Medications               losartan (COZAAR) 50 MG tablet Take 1 tablet " (50 mg total) by mouth once daily.            While in the hospital, will manage blood pressure as follows; Continue home antihypertensive regimen    Will utilize p.r.n. blood pressure medication only if patient's blood pressure greater than 180/110 and he develops symptoms such as worsening chest pain or shortness of breath.    JANKI (obstructive sleep apnea)  Patient with CPAP at night.        VTE Risk Mitigation (From admission, onward)           Ordered     enoxaparin injection 40 mg  Every 12 hours         07/30/24 0958     IP VTE HIGH RISK PATIENT  Once         07/30/24 0958     Place sequential compression device  Until discontinued         07/30/24 0958                         On 07/30/2024, patient should be placed in hospital observation services under my care in collaboration with Dr. Young.           Rachelle San NP  Department of Hospital Medicine  [facility]

## 2024-07-30 NOTE — LETTER
July 31, 2024         [address]  [facility] [address]  Phone: [phone]  Fax: [phone]       Patient: Richi Soriano   YOB: 1975  Date of Visit: 07/31/2024    To Whom It May Concern:    Aurea Soriano  was at [facility] on 07/31/2024. The patient may return to work/school on 08/05/2024 with no restrictions. If you have any questions or concerns, or if I can be of further assistance, please do not hesitate to contact me.    Sincerely,    Ernestine Harris RN

## 2024-07-30 NOTE — ASSESSMENT & PLAN NOTE
" Patient is chronically on statin.will continue for now. Monitor clinically. Last LDL was No results found for: "LDLCALC"      "

## 2024-07-30 NOTE — ASSESSMENT & PLAN NOTE
Patient with chest pain on exertion.  Relieved with nitroglycerin x2 in the ER.  Heart score 4.  Discussed with ED provider and will consult cardiologist.   Stress test in 2022 is negative for myocardial ischemia.  Spoke with nurse at Dr. Patel office.

## 2024-07-30 NOTE — ASSESSMENT & PLAN NOTE
Chronic, controlled. Latest blood pressure and vitals reviewed-     Temp:  [97.8 °F (36.6 °C)-98.3 °F (36.8 °C)]   Pulse:  [61-86]   Resp:  [12-20]   BP: (111-156)/(57-83)   SpO2:  [95 %-99 %] .   Home meds for hypertension were reviewed and noted below.   Hypertension Medications               losartan (COZAAR) 50 MG tablet Take 1 tablet (50 mg total) by mouth once daily.            While in the hospital, will manage blood pressure as follows; Continue home antihypertensive regimen    Will utilize p.r.n. blood pressure medication only if patient's blood pressure greater than 180/110 and he develops symptoms such as worsening chest pain or shortness of breath.

## 2024-07-30 NOTE — HPI
Richi Soriano is 48-year-old male who has a history of type 2 diabetes, hyperlipidemia, hypertension, and sleep apnea who presents to the ED for evaluation of midsternal chest pain which he did describes as pressure-like intermittent for 2 days.  He reports increased chest pain with exertion.  Denies associated nausea vomiting or abdominal pain.  He reports occasional productive cough with white sputum.  He was diagnosed with COVID-19 on 07/24/2024.  According to the ED note, Patient was given nitroglycerin x2 with improvement of his chest pain.  Patient states he is followed by Dr. Patel at [facility].    Labs reviewed.  CBC CMP unremarkable.  Cardiac high sensitivity troponin negative x2.  EKG NSR with no signs of ischemia.    Patient underwent cardiac stress test in November of 2022 which is negative for myocardial ischemia.  Echocardiogram completed in January of 2023 which demonstrates normal left ventricular function with mild diastolic dysfunction.  Patient able to pull up results and his I phone.

## 2024-07-31 VITALS
TEMPERATURE: 98 F | HEART RATE: 83 BPM | HEIGHT: 68 IN | DIASTOLIC BLOOD PRESSURE: 61 MMHG | WEIGHT: 315 LBS | RESPIRATION RATE: 16 BRPM | BODY MASS INDEX: 47.74 KG/M2 | SYSTOLIC BLOOD PRESSURE: 120 MMHG | OXYGEN SATURATION: 98 %

## 2024-07-31 LAB
ALBUMIN SERPL BCP-MCNC: 4.1 G/DL (ref 3.5–5.2)
ALP SERPL-CCNC: 71 U/L (ref 55–135)
ALT SERPL W/O P-5'-P-CCNC: 40 U/L (ref 10–44)
ANION GAP SERPL CALC-SCNC: 14 MMOL/L (ref 8–16)
AST SERPL-CCNC: 27 U/L (ref 10–40)
BASOPHILS # BLD AUTO: 0.13 K/UL (ref 0–0.2)
BASOPHILS NFR BLD: 1.5 % (ref 0–1.9)
BILIRUB SERPL-MCNC: 0.5 MG/DL (ref 0.1–1)
BUN SERPL-MCNC: 17 MG/DL (ref 6–20)
CALCIUM SERPL-MCNC: 8.7 MG/DL (ref 8.7–10.5)
CHLORIDE SERPL-SCNC: 104 MMOL/L (ref 95–110)
CO2 SERPL-SCNC: 16 MMOL/L (ref 23–29)
CREAT SERPL-MCNC: 0.7 MG/DL (ref 0.5–1.4)
DIFFERENTIAL METHOD BLD: ABNORMAL
EOSINOPHIL # BLD AUTO: 0.3 K/UL (ref 0–0.5)
EOSINOPHIL NFR BLD: 3.4 % (ref 0–8)
ERYTHROCYTE [DISTWIDTH] IN BLOOD BY AUTOMATED COUNT: 13.1 % (ref 11.5–14.5)
EST. GFR  (NO RACE VARIABLE): >60 ML/MIN/1.73 M^2
GLUCOSE SERPL-MCNC: 155 MG/DL (ref 70–110)
HCT VFR BLD AUTO: 43.3 % (ref 40–54)
HGB BLD-MCNC: 14.6 G/DL (ref 14–18)
IMM GRANULOCYTES # BLD AUTO: 0.4 K/UL (ref 0–0.04)
IMM GRANULOCYTES NFR BLD AUTO: 4.7 % (ref 0–0.5)
LYMPHOCYTES # BLD AUTO: 1.5 K/UL (ref 1–4.8)
LYMPHOCYTES NFR BLD: 17.5 % (ref 18–48)
MAGNESIUM SERPL-MCNC: 2.2 MG/DL (ref 1.6–2.6)
MCH RBC QN AUTO: 30.9 PG (ref 27–31)
MCHC RBC AUTO-ENTMCNC: 33.7 G/DL (ref 32–36)
MCV RBC AUTO: 92 FL (ref 82–98)
MONOCYTES # BLD AUTO: 0.7 K/UL (ref 0.3–1)
MONOCYTES NFR BLD: 7.9 % (ref 4–15)
NEUTROPHILS # BLD AUTO: 5.5 K/UL (ref 1.8–7.7)
NEUTROPHILS NFR BLD: 65 % (ref 38–73)
NRBC BLD-RTO: 0 /100 WBC
PLATELET # BLD AUTO: 195 K/UL (ref 150–450)
PMV BLD AUTO: 10.6 FL (ref 9.2–12.9)
POTASSIUM SERPL-SCNC: 4.5 MMOL/L (ref 3.5–5.1)
PROT SERPL-MCNC: 6.7 G/DL (ref 6–8.4)
RBC # BLD AUTO: 4.73 M/UL (ref 4.6–6.2)
SODIUM SERPL-SCNC: 134 MMOL/L (ref 136–145)
WBC # BLD AUTO: 8.47 K/UL (ref 3.9–12.7)

## 2024-07-31 PROCEDURE — 63600175 PHARM REV CODE 636 W HCPCS: Performed by: NURSE PRACTITIONER

## 2024-07-31 PROCEDURE — 36415 COLL VENOUS BLD VENIPUNCTURE: CPT | Performed by: NURSE PRACTITIONER

## 2024-07-31 PROCEDURE — 85025 COMPLETE CBC W/AUTO DIFF WBC: CPT | Performed by: NURSE PRACTITIONER

## 2024-07-31 PROCEDURE — 25000003 PHARM REV CODE 250: Performed by: NURSE PRACTITIONER

## 2024-07-31 PROCEDURE — 83735 ASSAY OF MAGNESIUM: CPT | Performed by: NURSE PRACTITIONER

## 2024-07-31 PROCEDURE — 94761 N-INVAS EAR/PLS OXIMETRY MLT: CPT

## 2024-07-31 PROCEDURE — 25000003 PHARM REV CODE 250

## 2024-07-31 PROCEDURE — G0378 HOSPITAL OBSERVATION PER HR: HCPCS

## 2024-07-31 PROCEDURE — 96372 THER/PROPH/DIAG INJ SC/IM: CPT | Performed by: NURSE PRACTITIONER

## 2024-07-31 PROCEDURE — 80053 COMPREHEN METABOLIC PANEL: CPT | Performed by: NURSE PRACTITIONER

## 2024-07-31 RX ORDER — ASPIRIN 81 MG/1
81 TABLET ORAL DAILY
Status: DISCONTINUED | OUTPATIENT
Start: 2024-07-31 | End: 2024-07-31 | Stop reason: HOSPADM

## 2024-07-31 RX ORDER — ASPIRIN 81 MG/1
81 TABLET ORAL DAILY
Qty: 30 TABLET | Refills: 0 | Status: SHIPPED | OUTPATIENT
Start: 2024-08-01

## 2024-07-31 RX ADMIN — ASPIRIN 81 MG: 81 TABLET, COATED ORAL at 09:07

## 2024-07-31 RX ADMIN — ENOXAPARIN SODIUM 40 MG: 40 INJECTION SUBCUTANEOUS at 08:07

## 2024-07-31 RX ADMIN — LOSARTAN POTASSIUM 50 MG: 50 TABLET, FILM COATED ORAL at 08:07

## 2024-07-31 NOTE — CARE UPDATE
07/31/24 0738   CPAP/BiPAP Settings   Mode Of Delivery CPAP;other (see comments)  (home unit)   Equipment Type Other (see comment)  (home unit)

## 2024-07-31 NOTE — CONSULTS
[facility]   Cardiology Note    Consult Requested By: BILLY  Reason for Consult: chest pain     SUBJECTIVE:     History of Present Illness: The pt is 47 y/o Male with pmh htn, dm, hlp who presented to ED with midsternal exertional chest pain no associated n/v diaphoresis , non-radiating. Trop neg , EKG non-ischemic.     This am h/h wnl , liver and kidney function wnl d-dimer neg covid ++ 7/30  cxr NAPD BP stable SR 80's     This am the pt is lying in bed with no c/o chest pain. The pt had this one isolated episode and has ambulated around room with no further symptoms, tele reviewed no arrhythmias. The pt bp stable , afebrile, no URI symptoms.     Review of patient's allergies indicates:  No Known Allergies    Past Medical History:   Diagnosis Date    Diabetes mellitus, type 2     Diverticulitis of colon     Hyperlipidemia     Hypertension     Sleep apnea      Past Surgical History:   Procedure Laterality Date    ABDOMINAL SURGERY      APPENDECTOMY      COLON SURGERY      HERNIA REPAIR       Family History   Family history unknown: Yes     Social History     Tobacco Use    Smoking status: Former     Current packs/day: 0.50     Average packs/day: 0.5 packs/day for 10.0 years (5.0 ttl pk-yrs)     Types: Cigarettes     Passive exposure: Past    Smokeless tobacco: Never   Substance Use Topics    Alcohol use: No     Comment: ocassionally    Drug use: No       Review of Systems:  Review of Systems   Constitutional:  Negative for chills and fever.   Cardiovascular:  Negative for chest pain, palpitations, orthopnea and claudication.   Gastrointestinal:  Negative for abdominal pain, heartburn, nausea and vomiting.   Neurological:  Negative for dizziness and headaches.       OBJECTIVE:     Vital Signs (Most Recent)  Temp: 98 °F (36.7 °C) (07/31/24 0745)  Pulse: 83 (07/31/24 0745)  Resp: 16 (07/31/24 0745)  BP: 120/61 (07/31/24 0745)  SpO2: 98 % (07/31/24 0745)    Vital Signs Range (Last 24H):  Temp:  [97.3 °F (36.3  °C)-98.4 °F (36.9 °C)]   Pulse:  [61-83]   Resp:  [12-20]   BP: (106-156)/(57-83)   SpO2:  [95 %-98 %]     I & O (Last 24H):    Intake/Output Summary (Last 24 hours) at 7/31/2024 0842  Last data filed at 7/31/2024 0445  Gross per 24 hour   Intake 660 ml   Output 400 ml   Net 260 ml       Current Diet:     Current Diet Order   Procedures    Diet Cardiac        Allergies:  Review of patient's allergies indicates:  No Known Allergies    Meds:  Scheduled Meds:   atorvastatin  20 mg Oral QHS    enoxparin  40 mg Subcutaneous Q12H    losartan  50 mg Oral Daily    senna-docusate 8.6-50 mg  1 tablet Oral Daily     Continuous Infusions:  PRN Meds:  Current Facility-Administered Medications:     acetaminophen, 650 mg, Oral, Q8H PRN    acetaminophen, 650 mg, Oral, Q4H PRN    aluminum-magnesium hydroxide-simethicone, 30 mL, Oral, QID PRN    dextrose 50%, 12.5 g, Intravenous, PRN    dextrose 50%, 25 g, Intravenous, PRN    glucagon (human recombinant), 1 mg, Intramuscular, PRN    glucose, 16 g, Oral, PRN    glucose, 24 g, Oral, PRN    HYDROcodone-acetaminophen, 1 tablet, Oral, Q6H PRN    magnesium oxide, 800 mg, Oral, PRN    magnesium oxide, 800 mg, Oral, PRN    melatonin, 6 mg, Oral, Nightly PRN    naloxone, 0.02 mg, Intravenous, PRN    nitroGLYCERIN, 0.4 mg, Sublingual, Q5 Min PRN    ondansetron, 4 mg, Intravenous, Q6H PRN    potassium bicarbonate, 35 mEq, Oral, PRN    potassium bicarbonate, 50 mEq, Oral, PRN    potassium bicarbonate, 60 mEq, Oral, PRN    potassium, sodium phosphates, 2 packet, Oral, PRN    potassium, sodium phosphates, 2 packet, Oral, PRN    potassium, sodium phosphates, 2 packet, Oral, PRN    sodium chloride 0.9%, 10 mL, Intravenous, Q12H PRN    Oxygen/Ventilator Data (Last 24H):  (if applicable)            Hemodynamic Parameters (Last 24H):   (if applicable)        Laboratory and Radiology Data:  Recent Results (from the past 24 hour(s))   COVID-19 Rapid Screening    Collection Time: 07/30/24  9:15 AM    Result Value Ref Range    SARS-CoV-2 RNA, Amplification, Qual Positive (AA) Negative   Troponin I High Sensitivity #2    Collection Time: 07/30/24  9:21 AM   Result Value Ref Range    Troponin I High Sensitivity 2.7 0.0 - 14.9 pg/mL   POCT glucose    Collection Time: 07/30/24  3:46 PM   Result Value Ref Range    POC Glucose 101 70 - 110   Comprehensive Metabolic Panel (CMP)    Collection Time: 07/31/24  5:32 AM   Result Value Ref Range    Sodium 134 (L) 136 - 145 mmol/L    Potassium 4.5 3.5 - 5.1 mmol/L    Chloride 104 95 - 110 mmol/L    CO2 16 (L) 23 - 29 mmol/L    Glucose 155 (H) 70 - 110 mg/dL    BUN 17 6 - 20 mg/dL    Creatinine 0.7 0.5 - 1.4 mg/dL    Calcium 8.7 8.7 - 10.5 mg/dL    Total Protein 6.7 6.0 - 8.4 g/dL    Albumin 4.1 3.5 - 5.2 g/dL    Total Bilirubin 0.5 0.1 - 1.0 mg/dL    Alkaline Phosphatase 71 55 - 135 U/L    AST 27 10 - 40 U/L    ALT 40 10 - 44 U/L    eGFR >60.0 >60 mL/min/1.73 m^2    Anion Gap 14 8 - 16 mmol/L   Magnesium    Collection Time: 07/31/24  5:32 AM   Result Value Ref Range    Magnesium 2.2 1.6 - 2.6 mg/dL   CBC Auto Differential    Collection Time: 07/31/24  8:08 AM   Result Value Ref Range    WBC 8.47 3.90 - 12.70 K/uL    RBC 4.73 4.60 - 6.20 M/uL    Hemoglobin 14.6 14.0 - 18.0 g/dL    Hematocrit 43.3 40.0 - 54.0 %    MCV 92 82 - 98 fL    MCH 30.9 27.0 - 31.0 pg    MCHC 33.7 32.0 - 36.0 g/dL    RDW 13.1 11.5 - 14.5 %    Platelets 195 150 - 450 K/uL    MPV 10.6 9.2 - 12.9 fL    Immature Granulocytes 4.7 (H) 0.0 - 0.5 %    Gran # (ANC) 5.5 1.8 - 7.7 K/uL    Immature Grans (Abs) 0.40 (H) 0.00 - 0.04 K/uL    Lymph # 1.5 1.0 - 4.8 K/uL    Mono # 0.7 0.3 - 1.0 K/uL    Eos # 0.3 0.0 - 0.5 K/uL    Baso # 0.13 0.00 - 0.20 K/uL    nRBC 0 0 /100 WBC    Gran % 65.0 38.0 - 73.0 %    Lymph % 17.5 (L) 18.0 - 48.0 %    Mono % 7.9 4.0 - 15.0 %    Eosinophil % 3.4 0.0 - 8.0 %    Basophil % 1.5 0.0 - 1.9 %    Differential Method Automated      Imaging Results              X-Ray Chest AP Portable  (Final result)  Result time 07/30/24 07:06:13      Final result by Moises Foley DO (07/30/24 07:06:13)                   Impression:      No acute cardiopulmonary abnormality.      Electronically signed by: Moises Foley  Date:    07/30/2024  Time:    07:06               Narrative:    EXAMINATION:  XR CHEST AP PORTABLE    CLINICAL HISTORY:  chest pain;    FINDINGS:  Portable chest with comparison chest 8/17/2022.  Normal cardiomediastinal silhouette.Lungs are clear. Pulmonary vasculature is normal. No acute osseous abnormality.                                      12-lead EKG interpretation:  (if applicable)      Current Cardiac Rhythm:   (if applicable)    Physical Exam:   Physical Exam  Cardiovascular:      Rate and Rhythm: Normal rate and regular rhythm.      Heart sounds: No murmur heard.  Pulmonary:      Effort: Pulmonary effort is normal.      Breath sounds: Normal breath sounds.   Abdominal:      General: Abdomen is flat. Bowel sounds are normal.      Palpations: Abdomen is soft.   Musculoskeletal:         General: No swelling.   Skin:     General: Skin is warm and dry.   Neurological:      Mental Status: He is alert and oriented to person, place, and time.   Psychiatric:         Mood and Affect: Mood normal.         Behavior: Behavior normal.         ASSESSMENT/PLAN:   Assessment:   Chest pain  Covid  HTN  DM  Obesity   JANKI   HLP    Plan:   No further episodes of chest pain  Trop neg, labs stable  EKG non ischemic   D-dimer neg  Bp stable, afebrile  D/W patient , will plan outpatient ischemic w/u due to several risk factors  Ok for discharge home today follow up in office to schedule stress test and echo in one week.

## 2024-07-31 NOTE — NURSING
Pt was given discharge papers and informed that he had  to wait for Dr Best to see him, messages were sent to Dr Best and CM, apparently pt removed his own IV and left before being seen. he had scheduled his stress test at the [facility] for 11:30 today and did not want to be late or have to reschedule

## 2024-07-31 NOTE — DISCHARGE SUMMARY
[facility]  Discharge Summary      Patient Name: Richi Soriano  MRN: [id number]  [facility]: [id number]  Patient Class: OP- Observation  Admission Date: 7/30/2024  Hospital Length of Stay: 0 days  Discharge Date and Time:  07/31/2024 11:34 AM  Attending Physician: Norman Best MD   Discharging Provider: TERESA Benitez  Primary Care Provider: Nancy Hollis FNP-C    Primary Care Team: Networked reference to record PCT     HPI:   Richi Soriano is 48-year-old male who has a history of type 2 diabetes, hyperlipidemia, hypertension, and sleep apnea who presents to the ED for evaluation of midsternal chest pain which he did describes as pressure-like intermittent for 2 days.  He reports increased chest pain with exertion.  Denies associated nausea vomiting or abdominal pain.  He reports occasional productive cough with white sputum.  He was diagnosed with COVID-19 on 07/24/2024.  According to the ED note, Patient was given nitroglycerin x2 with improvement of his chest pain.  Patient states he is followed by Dr. Patel at [facility].    Labs reviewed.  CBC CMP unremarkable.  Cardiac high sensitivity troponin negative x2.  EKG NSR with no signs of ischemia.    Patient underwent cardiac stress test in November of 2022 which is negative for myocardial ischemia.  Echocardiogram completed in January of 2023 which demonstrates normal left ventricular function with mild diastolic dysfunction.  Patient able to pull up results and his I phone.    * No surgery found *      Hospital Course:   48-year-old male presented to the ED for evaluation of midsternal chest pain which he did described as pressure-like intermittent for 2 days.  Patient was given nitroglycerin x2 with improvement of his chest pain.  CBC and CMP unremarkable.  Cardiac high sensitivity troponin negative x2.  Chest x-ray nonacute.  EKG NSR with no signs of ischemia.  Cardiology was consulted,  recommended outpatient follow up with echo and stress test in 1 week.  Chest pain resolved.  Plan of care was discussed with patient and verbalized understanding.  Patient was seen and examined on day of discharge.      Goals of Care Treatment Preferences:  Code Status: Full Code      Consults:   Consults (From admission, onward)          Status Ordering Provider     Inpatient consult to Cardiology  Once        Provider:  Jayant Patel MD    Completed MATT KAHN            No new Assessment & Plan notes have been filed under this hospital service since the last note was generated.  Service: Hospital Medicine    Final Active Diagnoses:    Diagnosis Date Noted POA    PRINCIPAL PROBLEM:  Chest pain [R07.9] 07/30/2024 Yes    Type 2 diabetes mellitus with hyperglycemia, without long-term current use of insulin [E11.65] 07/30/2024 Yes    JANKI (obstructive sleep apnea) [G47.33] 08/18/2022 Yes    Hypertension [I10] 08/18/2022 Yes    Hyperlipidemia [E78.5] 07/16/2018 Yes      Problems Resolved During this Admission:       Discharged Condition: good    Disposition: Home or Self Care    Follow Up:   Follow-up Information       Nancy Hollis FNP-CODIE Follow up in 1 week(s).    Specialty: Family Medicine  Contact information:  [address] [facility]  [address]  [place] [address]  [phone]               Jayant Patel MD Follow up in 1 week(s).    Specialties: Cardiology, Interventional Cardiology  Contact information:  [address]  [place] [address]  [phone]                           Patient Instructions:      Diet Cardiac     Diet diabetic     Notify your health care provider if you experience any of the following:  temperature >100.4     Notify your health care provider if you experience any of the following:  persistent nausea and vomiting or diarrhea     Notify your health care provider if you experience any of the following:  severe uncontrolled pain     Notify your health care provider if  you experience any of the following:  difficulty breathing or increased cough     Notify your health care provider if you experience any of the following:  severe persistent headache     Notify your health care provider if you experience any of the following:  worsening rash     Notify your health care provider if you experience any of the following:  persistent dizziness, light-headedness, or visual disturbances     Notify your health care provider if you experience any of the following:  increased confusion or weakness     Notify your health care provider if you experience any of the following:   Order Comments: Chest pain, shortness of breath     Activity as tolerated       Significant Diagnostic Studies: Labs: CMP   Recent Labs   Lab 07/30/24  0615 07/31/24  0532    134*   K 4.2 4.5    104   CO2 22* 16*   * 155*   BUN 16 17   CREATININE 0.8 0.7   CALCIUM 9.2 8.7   PROT 6.9 6.7   ALBUMIN 4.3 4.1   BILITOT 0.7 0.5   ALKPHOS 71 71   AST 22 27   ALT 41 40   ANIONGAP 12 14    and CBC   Recent Labs   Lab 07/30/24  0615 07/31/24  0808   WBC 8.08 8.47   HGB 13.9* 14.6   HCT 42.4 43.3    195       Pending Diagnostic Studies:       None           Medications:  Reconciled Home Medications:      Medication List        START taking these medications      aspirin 81 MG EC tablet  Commonly known as: ECOTRIN  Take 1 tablet (81 mg total) by mouth once daily.  Start taking on: August 1, 2024            CHANGE how you take these medications      glipiZIDE 10 MG Tr24  Commonly known as: GLUCOTROL  TAKE 1 TABLET(10 MG) BY MOUTH DAILY WITH BREAKFAST  What changed:   how much to take  how to take this  when to take this     metFORMIN 750 MG ER 24hr tablet  Commonly known as: GLUCOPHAGE-XR  TAKE 2 TABLETS(1500 MG) BY MOUTH DAILY WITH BREAKFAST  What changed:   how much to take  how to take this  when to take this     MOUNJARO 7.5 mg/0.5 mL Pnij  Generic drug: tirzepatide  ADMINISTER 7.5 MG UNDER THE SKIN EVERY  7 DAYS  What changed: See the new instructions.            CONTINUE taking these medications      atorvastatin 20 MG tablet  Commonly known as: LIPITOR  Take 1 tablet (20 mg total) by mouth once daily.     cholecalciferol (vitamin D3) 1,250 mcg (50,000 unit) capsule  Take 1 capsule by mouth every 7 days. MONDAYS     empagliflozin 25 mg tablet  Commonly known as: JARDIANCE  Take 1 tablet (25 mg total) by mouth once daily.     fenofibrate 160 MG Tab  Take 1 tablet (160 mg total) by mouth once daily.     losartan 50 MG tablet  Commonly known as: COZAAR  Take 1 tablet (50 mg total) by mouth once daily.     * ONETOUCH DELICA PLUS LANCET 33 gauge Misc  Generic drug: lancets  Apply topically 2 (two) times daily as needed.     * lancets 33 gauge Misc  OneTouch Delica Plus Lancet 33 gauge   USE TWICE DAILY AS DIRECTED AND AS NEEDED FOR BLOOD GLUCOSE TESTING     * ONETOUCH VERIO TEST STRIPS Strp  Generic drug: blood sugar diagnostic  2 (two) times daily as needed.     * blood sugar diagnostic Strp  OneTouch Verio test strips   TEST BLOOD SUGAR BID AND PRN           * This list has 4 medication(s) that are the same as other medications prescribed for you. Read the directions carefully, and ask your doctor or other care provider to review them with you.                ASK your doctor about these medications      promethazine-dextromethorphan 6.25-15 mg/5 mL Syrp  Commonly known as: PROMETHAZINE-DM  Take 5 mLs by mouth every 4 (four) hours as needed (Cough).              Indwelling Lines/Drains at time of discharge:   Lines/Drains/Airways       None                   Time spent on the discharge of patient: 35 minutes         TERESA Benitez  Department of Hospital Medicine  [facility]

## 2024-07-31 NOTE — PLAN OF CARE
Problem: Adult Inpatient Plan of Care  Goal: Plan of Care Review  Outcome: Progressing  Goal: Patient-Specific Goal (Individualized)  Outcome: Progressing  Goal: Absence of Hospital-Acquired Illness or Injury  Outcome: Progressing  Goal: Optimal Comfort and Wellbeing  Outcome: Progressing  Goal: Readiness for Transition of Care  Outcome: Progressing     Problem: Bariatric Environmental Safety  Goal: Safety Maintained with Care  Outcome: Progressing     Problem: Diabetes Comorbidity  Goal: Blood Glucose Level Within Targeted Range  Outcome: Progressing     Problem: Fall Injury Risk  Goal: Absence of Fall and Fall-Related Injury  Outcome: Progressing

## 2024-07-31 NOTE — HOSPITAL COURSE
48-year-old male presented to the ED for evaluation of midsternal chest pain which he did described as pressure-like intermittent for 2 days.  Patient was given nitroglycerin x2 with improvement of his chest pain.  CBC and CMP unremarkable.  Cardiac high sensitivity troponin negative x2.  Chest x-ray nonacute.  EKG NSR with no signs of ischemia.  Cardiology was consulted, recommended outpatient follow up with echo and stress test in 1 week.  Chest pain resolved.  Plan of care was discussed with patient and verbalized understanding.  Patient was seen and examined on day of discharge.

## 2024-07-31 NOTE — RESPIRATORY THERAPY
07/30/24 2030   Patient Assessment/Suction   Level of Consciousness (AVPU) alert   Respiratory Effort Normal;Unlabored   PRE-TX-O2   Device (Oxygen Therapy) CPAP   SpO2 95 %   Pulse Oximetry Type Intermittent   $ Pulse Oximetry - Multiple Charge Pulse Oximetry - Multiple   Pulse 67   Resp 16   Preset CPAP/BiPAP Settings   Mode Of Delivery CPAP;other (see comments)  (Home cpap)   $ CPAP/BiPAP Daily Charge BiPAP/CPAP Daily   $ Initial CPAP/BiPAP Setup? Yes   $ Is patient using? Yes   Equipment Type Other (see comment)  (Home cpap)

## 2024-08-01 ENCOUNTER — PATIENT OUTREACH (OUTPATIENT)
Dept: FAMILY MEDICINE | Facility: CLINIC | Age: 49
End: 2024-08-01
Payer: COMMERCIAL

## 2024-08-01 NOTE — TELEPHONE ENCOUNTER
Discharge Information     Discharge Date:   07/31/2024    Primary Discharge Diagnosis:  Chest pain. KM      Discharge Summary:  Reviewed      Medication & Order Review     Were medication changes made or new medications added?   Yes    If so, has the patient filled the prescriptions?  Yes     Was Home Health ordered? No    If so, has Home Health contacted patient and/or initiated services?  N/A    Name of Home Health Agency? N/A    Durable Medical Equipment ordered?  No     If so, has the DME provider contacted patient and delivered equipment?  N/A    Follow Up               Any problems since discharge? No    How is the patient feeling since returning home?  Pt states he feeling well since discharge with no abnormal signs or symptoms. KM    Have you set up recommended follow up appointments?  (cardiology, surgery, etc.) Pt scheduled with Cardio with Echo on 08/15/2024, Per pt. KM    Schedule Hospital Follow-up appointment within 7-14 days (preferably 7).  PCP appt scheduled with Nancy Hollis 08/06/2024 at 8:40. KM    Notes:  47 yo male adm on 07/30/2024 with dx of chest pain. Pt was d/c on 07/31/2024. Pt states felling well since discharge with abnormal signs or symptoms. Pt aware of scheduled HFU with PCP on 08/06/2024 at 8:40 and Cardio appt scheduled on 08/15/2024 with Echo. Pt instructed to contact clinic with any further questions or concerns and proceed to the nearest ED if he develop any abnormal signs or symptoms. Pt fully aware of information given. BEE Duenas

## 2024-08-02 ENCOUNTER — PATIENT MESSAGE (OUTPATIENT)
Dept: ADMINISTRATIVE | Facility: HOSPITAL | Age: 49
End: 2024-08-02
Payer: COMMERCIAL

## 2024-08-06 ENCOUNTER — OFFICE VISIT (OUTPATIENT)
Dept: FAMILY MEDICINE | Facility: CLINIC | Age: 49
End: 2024-08-06
Payer: COMMERCIAL

## 2024-08-06 VITALS
DIASTOLIC BLOOD PRESSURE: 62 MMHG | BODY MASS INDEX: 47.74 KG/M2 | HEIGHT: 68 IN | SYSTOLIC BLOOD PRESSURE: 124 MMHG | RESPIRATION RATE: 20 BRPM | WEIGHT: 315 LBS | HEART RATE: 72 BPM | TEMPERATURE: 98 F

## 2024-08-06 DIAGNOSIS — E11.9 TYPE 2 DIABETES MELLITUS WITHOUT COMPLICATION, WITHOUT LONG-TERM CURRENT USE OF INSULIN: ICD-10-CM

## 2024-08-06 DIAGNOSIS — Z09 HOSPITAL DISCHARGE FOLLOW-UP: Primary | ICD-10-CM

## 2024-08-06 DIAGNOSIS — I10 HYPERTENSION, UNSPECIFIED TYPE: ICD-10-CM

## 2024-08-06 DIAGNOSIS — Z12.11 COLON CANCER SCREENING: ICD-10-CM

## 2024-08-06 DIAGNOSIS — B35.1 TOENAIL FUNGUS: ICD-10-CM

## 2024-08-06 DIAGNOSIS — E78.1 HYPERTRIGLYCERIDEMIA WITHOUT HYPERCHOLESTEROLEMIA: ICD-10-CM

## 2024-08-06 DIAGNOSIS — E11.9 ENCOUNTER FOR DIABETIC FOOT EXAM: ICD-10-CM

## 2024-08-06 PROCEDURE — 99999 PR PBB SHADOW E&M-EST. PATIENT-LVL V: CPT | Mod: PBBFAC,,, | Performed by: NURSE PRACTITIONER

## 2024-08-06 PROCEDURE — 3074F SYST BP LT 130 MM HG: CPT | Mod: CPTII,S$GLB,, | Performed by: NURSE PRACTITIONER

## 2024-08-06 PROCEDURE — 3078F DIAST BP <80 MM HG: CPT | Mod: CPTII,S$GLB,, | Performed by: NURSE PRACTITIONER

## 2024-08-06 PROCEDURE — 1159F MED LIST DOCD IN RCRD: CPT | Mod: CPTII,S$GLB,, | Performed by: NURSE PRACTITIONER

## 2024-08-06 PROCEDURE — 99495 TRANSJ CARE MGMT MOD F2F 14D: CPT | Mod: S$GLB,,, | Performed by: NURSE PRACTITIONER

## 2024-08-06 PROCEDURE — 4010F ACE/ARB THERAPY RXD/TAKEN: CPT | Mod: CPTII,S$GLB,, | Performed by: NURSE PRACTITIONER

## 2024-08-07 ENCOUNTER — PATIENT OUTREACH (OUTPATIENT)
Dept: ADMINISTRATIVE | Facility: HOSPITAL | Age: 49
End: 2024-08-07
Payer: COMMERCIAL

## 2024-08-14 NOTE — PATIENT INSTRUCTIONS
Your Diabetes Foot Care Program    Every day you depend on your feet to keep you moving. But when you have diabetes, your feet need special care. Even a small foot problem can become very serious. So dont take your feet for granted. By working with your diabetes healthcare team, you can learn how to protect your feet and keep them healthy.  Evaluating your feet  An evaluation helps your healthcare provider check the condition of your feet. The evaluation includes a review of your diabetes history and overall health. It may also include a foot exam, X-rays, or other tests. These can help show problems beneath the skin that you cant see or feel.  Medical history  You will be asked about your overall health and any history of foot problems. Youll also discuss your diabetes history, such as whether your blood sugar level has changed over time. It also includes questions about sensations of pain, tingling, pins and needles, or numbness. Your healthcare provider will also want to know if you have high blood pressure and heart disease, or if you smoke. Be sure to mention any medicines (including over-the-counter), supplements, or herbal remedies you take.  Foot exam  A foot exam checks the condition of different parts of your foot. First, your skin and nails are examined for any signs of infection. Blood flow is checked by feeling for the pulses in each foot. You may also have tests to study the nerves in the foot. These include using a small filament (wire) to see how sensitive your feet are. In certain cases, you will be asked to walk a short distance to check for bone, joint, and muscle problems.  Diagnostic tests  If needed, your healthcare provider will suggest certain tests to learn more about your feet. These include:  Doppler tests to measure blood flow in the feet and lower leg.  X-rays, which can show bone or joint problems.  Other imaging tests, such as an MRI (magnetic resonance imaging), bone scan, and CT  (computed tomography) scan. These can help show bone infections.  Other tests, such as vascular tests, which study the blood flow in your feet and legs. You may also have nerve studies to learn how sensitive your feet are.  Creating a foot care program  Based on the evaluation, your healthcare provider will create a foot care program for you. Your program may be as simple as starting a daily self-care routine and changing the types of shoes your wear. It may also involve treating minor foot problems, such as a corn or blister. In some cases, surgery will be needed to treat an infection or mechanical problems, such as hammer toes.  Preventing problems  When you have diabetes, its easier to prevent problems than to treat them later on. So see your healthcare team for regular checkups and foot care. Your healthcare team can also help you learn more about caring for your feet at home. For example, you may be told to avoid walking barefoot. Or you may be told that special footwear is needed to protect your feet.  Have regular checkups  Foot problems can develop quickly. So be sure to follow your healthcare teams schedule for regular checkups. During office visits, take off your shoes and socks as soon as you get in the exam room. Ask your healthcare provider to examine your feet for problems. This will make it easier to find and treat small skin irritations before they get worse. Regular checkups can also help keep track of the blood flow and feeling in your feet. If you have neuropathy (lack of feeling in your feet), you will need to have checkups more often.  Learn about self-care  The more you know about diabetes and your feet, the easier it will be to prevent problems. Members of your healthcare team can teach you how to inspect your feet and teach you to look for warning signs. They can also give you other foot care tips. During office visits, be sure to ask any questions you have.  Date Last Reviewed: 7/1/2016  ©  5969-7944 [employer]. [address] [place] [address], [place] [address]. All rights reserved. This information is not intended as a substitute for professional medical care. Always follow your healthcare professional's instructions.

## 2024-08-29 ENCOUNTER — OFFICE VISIT (OUTPATIENT)
Dept: PODIATRY | Facility: CLINIC | Age: 49
End: 2024-08-29
Payer: COMMERCIAL

## 2024-08-29 VITALS — BODY MASS INDEX: 47.74 KG/M2 | WEIGHT: 315 LBS | HEIGHT: 68 IN

## 2024-08-29 DIAGNOSIS — E11.65 TYPE 2 DIABETES MELLITUS WITH HYPERGLYCEMIA, WITHOUT LONG-TERM CURRENT USE OF INSULIN: ICD-10-CM

## 2024-08-29 DIAGNOSIS — B35.1 TOENAIL FUNGUS: ICD-10-CM

## 2024-08-29 DIAGNOSIS — B35.3 TINEA PEDIS OF BOTH FEET: ICD-10-CM

## 2024-08-29 DIAGNOSIS — E11.9 ENCOUNTER FOR DIABETIC FOOT EXAM: ICD-10-CM

## 2024-08-29 DIAGNOSIS — E11.9 COMPREHENSIVE DIABETIC FOOT EXAMINATION, TYPE 2 DM, ENCOUNTER FOR: Primary | ICD-10-CM

## 2024-08-29 PROCEDURE — G2211 COMPLEX E/M VISIT ADD ON: HCPCS | Mod: S$GLB,,, | Performed by: PODIATRIST

## 2024-08-29 PROCEDURE — 3008F BODY MASS INDEX DOCD: CPT | Mod: CPTII,S$GLB,, | Performed by: PODIATRIST

## 2024-08-29 PROCEDURE — 1160F RVW MEDS BY RX/DR IN RCRD: CPT | Mod: CPTII,S$GLB,, | Performed by: PODIATRIST

## 2024-08-29 PROCEDURE — 99999 PR PBB SHADOW E&M-EST. PATIENT-LVL III: CPT | Mod: PBBFAC,,, | Performed by: PODIATRIST

## 2024-08-29 PROCEDURE — 99203 OFFICE O/P NEW LOW 30 MIN: CPT | Mod: S$GLB,,, | Performed by: PODIATRIST

## 2024-08-29 PROCEDURE — 4010F ACE/ARB THERAPY RXD/TAKEN: CPT | Mod: CPTII,S$GLB,, | Performed by: PODIATRIST

## 2024-08-29 PROCEDURE — 1159F MED LIST DOCD IN RCRD: CPT | Mod: CPTII,S$GLB,, | Performed by: PODIATRIST

## 2024-08-29 NOTE — PROGRESS NOTES
"    [address] [facility] [place] [place] [address]  Phone: [phone]   [phone]    Patient's PCP:Nancy Hollis, TERESA-C  Referring Provider: Nancy Hollis    Subjective:      Chief Complaint:: Diabetic Foot Exam, Diabetes Mellitus, Nail Problem (Fungal nails), Fungus, Diabetes, and Tinea Pedis    Nail Problem  Pertinent negatives include no abdominal pain, arthralgias, chest pain, chills, coughing, fatigue, fever, headaches, joint swelling, myalgias, nausea, neck pain, numbness, rash or weakness.     Richi Soriano is a 48 y.o. male who presents today for a diabetic foot exam.  Pt has seen  on 8/6/24 who treats them for their diabetes.  Pt has been a diabetic for over 10 years.  Taking glipizide, jardiance, metformin, mounjaro to treat diabetes.    Blood sugar: not taken   Hemoglobin A1C: 7.0      Additionally, patient presents with dry skin and heel fissuring.  Patient has had dry skin and heel fissuring for over a year.  He is attempted topical treatment in the past.    Additionally, patient presents with thickened dystrophic nails.  Patient states he has had his thickened dystrophic nails for over a year as well.      Vitals:    08/29/24 0833   Weight: (!) 155.4 kg (342 lb 9.5 oz)   Height: 5' 8" (1.727 m)   PainSc: 0-No pain      Shoe Size: 10.5 wide    Past Surgical History:   Procedure Laterality Date    ABDOMINAL SURGERY      APPENDECTOMY      COLON SURGERY      HERNIA REPAIR       Past Medical History:   Diagnosis Date    Diabetes mellitus, type 2     Diverticulitis of colon     Hyperlipidemia     Hypertension     Sleep apnea      Family History   Family history unknown: Yes        Social History:   Marital Status:   Alcohol History:  reports no history of alcohol use.  Tobacco History:  reports that he has quit smoking. His smoking use included cigarettes. He has a 5 pack-year smoking history. He has been exposed to tobacco smoke. He has never used smokeless " tobacco.  Drug History:  reports no history of drug use.    Review of patient's allergies indicates:  No Known Allergies    Current Outpatient Medications   Medication Sig Dispense Refill    aspirin (ECOTRIN) 81 MG EC tablet Take 1 tablet (81 mg total) by mouth once daily. 30 tablet 0    atorvastatin (LIPITOR) 20 MG tablet Take 1 tablet (20 mg total) by mouth once daily. 30 tablet 0    cholecalciferol, vitamin D3, 1,250 mcg (50,000 unit) capsule Take 1 capsule by mouth every 7 days. MONDAYS      empagliflozin (JARDIANCE) 25 mg tablet Take 1 tablet (25 mg total) by mouth once daily. 90 tablet 1    fenofibrate 160 MG Tab Take 1 tablet (160 mg total) by mouth once daily. 90 tablet 1    glipiZIDE (GLUCOTROL) 10 MG TR24 TAKE 1 TABLET(10 MG) BY MOUTH DAILY WITH BREAKFAST (Patient taking differently: Take 10 mg by mouth daily with breakfast. TAKE 1 TABLET(10 MG) BY MOUTH DAILY WITH BREAKFAST) 90 tablet 1    losartan (COZAAR) 50 MG tablet Take 1 tablet (50 mg total) by mouth once daily. 90 tablet 1    metFORMIN (GLUCOPHAGE-XR) 750 MG ER 24hr tablet TAKE 2 TABLETS(1500 MG) BY MOUTH DAILY WITH BREAKFAST (Patient taking differently: Take 1,500 mg by mouth every evening. TAKE 2 TABLETS(1500 MG) BY MOUTH DAILY WITH BREAKFAST) 180 tablet 1    tirzepatide (MOUNJARO) 7.5 mg/0.5 mL PnIj ADMINISTER 7.5 MG UNDER THE SKIN EVERY 7 DAYS (Patient taking differently: Inject 7.5 mg into the skin once a week.) 2 mL 2     No current facility-administered medications for this visit.       Review of Systems   Constitutional:  Negative for chills, fatigue, fever and unexpected weight change.   HENT:  Negative for hearing loss and trouble swallowing.    Eyes:  Negative for photophobia and visual disturbance.   Respiratory:  Negative for cough, shortness of breath and wheezing.    Cardiovascular:  Negative for chest pain, palpitations and leg swelling.   Gastrointestinal:  Negative for abdominal pain and nausea.   Genitourinary:  Negative for  dysuria and frequency.   Musculoskeletal:  Negative for arthralgias, back pain, gait problem, joint swelling, myalgias and neck pain.   Skin:  Negative for rash and wound.   Neurological:  Negative for tremors, seizures, weakness, numbness and headaches.   Hematological:  Does not bruise/bleed easily.         Objective:        Physical Exam:   Foot Exam  Physical Exam  Physical examination: General: Pt. is well-developed, well-nourished, appears stated age, in no acute distress, alert and oriented x 3.    Vascular: Dorsalis pedis and posterior tibial pulses are 1/4  Bilaterally. Toes are warm to touch. Feet are warm proximally.      Neurologic: [place]-Maribel 5.07 monofilament is present bilateral feet. Sharp/dull sensation present Bilateral feet.    Dry scale with superficial flakes without ulceration, drainage, pus, tracking, fluctuance, malodor, or cardinal signs infection.    Toenails 1st, 2nd, 3rd, 4th, 5th  bilateral are hypertrophic, dystrophic, discolored tanish brown with tan, gray crumbly subungual debris.  Tender to distal nail plate pressure, without periungual skin abnormality of each.    Otherwise, Skin is normal age and health appropriate color, turgor, texture, and temperature bilateral lower extremities without ulceration, hyperpigmentation, discoloration, masses nodules or cords palpated.  No ecchymosis, erythema, edema, or cardinal signs of infection bilateral lower extremities.    Musculoskeletal: adequate joint range of motion without pain, limitation, nor crepitation Bilateral feet and ankle joints. Muscle strength is 5/5 in all groups bilaterally.    Lymphatics: no lymphangitic streaking bilaterally.    Dermatologic: Elongated, thickened, dystrophic, discolored nails x 10. Xerosis Bilaterally.      Imaging:            Assessment:       1. Comprehensive diabetic foot examination, type 2 DM, encounter for    2. Encounter for diabetic foot exam    3. Toenail fungus    4. Tinea pedis of both  feet    5. Type 2 diabetes mellitus with hyperglycemia, without long-term current use of insulin      Plan:   Comprehensive diabetic foot examination, type 2 DM, encounter for  -      DIABETES FOOT EXAM    Encounter for diabetic foot exam  -      DIABETES FOOT EXAM  -     Ambulatory referral/consult to Podiatry    Toenail fungus  -     Ambulatory referral/consult to Podiatry    Tinea pedis of both feet    Type 2 diabetes mellitus with hyperglycemia, without long-term current use of insulin      No follow-ups on file.    Procedures          Counseling:     I provided patient education verbally regarding:   Patient diagnosis, treatment options, as well as alternatives, risks, and benefits.     This note was created using Dragon voice recognition software that occasionally misinterpreted phrases or words.     Fungal infection of skin explained. Treatment options including no treatment, topical medications, oral medications were discussed, as well as success rates and risks of recurrence. We agreed on topical medication       Athlete's foot counseling: Counseled patient that it is important to keep the feet dry. Use absorbent cotton socks and change them if they become sweaty. Or wear an open-toe shoe or sandal. Wash the feet at least once a day with soap and water. Apply the antifungal cream as prescribed. Recommend spray antiperspirant to the feet. Some antifungal creams are available without a prescription. It may take a week before the rash starts to improve. It can take about 3 to 4 weeks to completely clear. Continue the medicine until the rash is all gone. Use over-the-counter antifungal powders or sprays on your feet after exposure to high-risk environments, such as public showers, gyms, and locker rooms. This can help prevent future infections. Wearing appropriate shoes in these situations can help.   Discussed with patient that if his topical antifungal cream at home does not work we can consider oral  medication in the future    Fungal infection of toenails explained. Treatment options including no treatment, periodic debridement, topical medications, oral medications, and removal of the nail were discussed, as well as success rates and risks of recurrence. We agreed on periodic debridement       Additional patient instructions:     - Check feet daily for wounds and areas of irritation.     - Keep regularly scheduled appointments     - Apply moisturizing cream to feet and ankles daily but not between toes.     - Keep feet clean and dry, especially between toes.     - Elevate feet as much as possible throughout the day.     - Wear supportive/accommodative shoes at all times when ambulating.    - Notify clinic immediately of any new or worsening conditions.      Counseling/Education:  I provided patient education verbally regarding:   The aspects of diabetes and how it pertains to the feet. I explained the importance of proper diabetic foot care and how it is essential for the health of their feet.    I discussed the importance of knowing their Hemoglobin A1c and that the level needs to be as close to 6 as possible. I discussed the increase complications of high blood sugar including stroke, blindness, heart attack, kidney failure and loss of limb secondary to neuropathy and PVD.     With neuropathy, beware of any breaks in the skin or redness. These areas are not recognized early due to the numbness.    I discussed Diabetes, lower back issues, metabolic disorders, systemic causes, chemotherapy, vitamin deficiency, heavy metal exposure, as some of the causes. I also explained that as much as 40% of the time we can not find a cause. I discussed different treatments available to control the symptoms but which may not cure the problem.       Counseled patient on the aspects of diabetes and how it pertains to the feet.  I explained the importance of proper diabetic foot care and how it is essential for the health of  their feet.      Shoe inspection. Patient instructed on proper foot hygeine. We discussed wearing proper shoe gear, daily foot inspections, never walking without protective shoe gear, never putting sharp instruments to feet, routine podiatric nail visits every 2-3 months.    I counseled the patient on their conditions, their implications and medical management.     The risk of complications, morbidity, and mortality of patient management decisions have been made at the time of this visit. These are associated with the patient's problems, diagnostic procedures and treatment options. This includes the possible management options selected and those considered but not selected by the patient after shared medical decision making we discussed with the patient.     Patient should call the office immediately if any signs of infection, such as fever, chills, sweats, increased redness or pain.    Patient was instructed to call the clinic or go to the emergency department if their symptoms do not improve, worsens, or if new symptoms develop.  Patient was advised that if any increased swelling, pain, or numbness arise to go immediately to the ED. Patient knows to call any time if an emergency arises. Shared decision making occurred and patient verbalized understanding in agreement with this plan.

## 2024-08-29 NOTE — LETTER
August 29, 2024      [facility]  [address]  [address]  [place] [address]  Phone: [phone]  Fax: [phone]       Patient: Richi Soriano   YOB: 1975  Date of Visit: 08/29/2024    To Whom It May Concern:    uArea Soriano  was at [facility] on 08/29/2024. The patient may return to work on 8/29/24 with no restrictions. If you have any questions or concerns, or if I can be of further assistance, please do not hesitate to contact me.    Sincerely,    Electronically Signed by: INOCENCIO Howard LPN

## 2024-09-20 ENCOUNTER — TELEPHONE (OUTPATIENT)
Dept: GASTROENTEROLOGY | Facility: CLINIC | Age: 49
End: 2024-09-20
Payer: COMMERCIAL

## 2024-09-20 NOTE — TELEPHONE ENCOUNTER
Call placed to Mr. Stoddard in regards to appt on 10/9. No answer, left message to return call.     Appt notes stated he is coming in for a screening colonoscopy.

## 2024-09-23 ENCOUNTER — PATIENT MESSAGE (OUTPATIENT)
Dept: GASTROENTEROLOGY | Facility: CLINIC | Age: 49
End: 2024-09-23
Payer: COMMERCIAL

## 2024-09-24 DIAGNOSIS — E11.9 TYPE 2 DIABETES MELLITUS WITHOUT COMPLICATION, WITHOUT LONG-TERM CURRENT USE OF INSULIN: ICD-10-CM

## 2024-09-24 DIAGNOSIS — E78.1 HYPERTRIGLYCERIDEMIA WITHOUT HYPERCHOLESTEROLEMIA: ICD-10-CM

## 2024-09-24 DIAGNOSIS — I10 HYPERTENSION, UNSPECIFIED TYPE: ICD-10-CM

## 2024-09-25 RX ORDER — FENOFIBRATE 160 MG/1
160 TABLET ORAL
Qty: 90 TABLET | Refills: 1 | Status: SHIPPED | OUTPATIENT
Start: 2024-09-25

## 2024-09-25 RX ORDER — METFORMIN HYDROCHLORIDE 750 MG/1
TABLET, EXTENDED RELEASE ORAL
Qty: 180 TABLET | Refills: 1 | Status: SHIPPED | OUTPATIENT
Start: 2024-09-25

## 2024-09-25 RX ORDER — GLIPIZIDE 10 MG/1
TABLET, FILM COATED, EXTENDED RELEASE ORAL
Qty: 90 TABLET | Refills: 1 | Status: SHIPPED | OUTPATIENT
Start: 2024-09-25

## 2024-09-25 RX ORDER — LOSARTAN POTASSIUM 50 MG/1
50 TABLET ORAL
Qty: 90 TABLET | Refills: 1 | Status: SHIPPED | OUTPATIENT
Start: 2024-09-25

## 2024-09-25 RX ORDER — EMPAGLIFLOZIN 25 MG/1
25 TABLET, FILM COATED ORAL
Qty: 90 TABLET | Refills: 1 | Status: SHIPPED | OUTPATIENT
Start: 2024-09-25

## 2024-10-08 ENCOUNTER — TELEPHONE (OUTPATIENT)
Dept: GASTROENTEROLOGY | Facility: CLINIC | Age: 49
End: 2024-10-08
Payer: COMMERCIAL

## 2024-10-08 DIAGNOSIS — Z86.0100 HISTORY OF COLON POLYPS: Primary | ICD-10-CM

## 2024-10-08 NOTE — TELEPHONE ENCOUNTER
Colonoscopy 12/6 instructions reviewed and patient states understanding. Copy to portal.  Patient will hold monjuro for 7 days prior

## 2024-10-29 ENCOUNTER — TELEPHONE (OUTPATIENT)
Dept: GASTROENTEROLOGY | Facility: CLINIC | Age: 49
End: 2024-10-29
Payer: COMMERCIAL

## 2024-11-03 ENCOUNTER — HOSPITAL ENCOUNTER (OUTPATIENT)
Facility: HOSPITAL | Age: 49
Discharge: HOME | End: 2024-11-04
Attending: EMERGENCY MEDICINE | Admitting: STUDENT IN AN ORGANIZED HEALTH CARE EDUCATION/TRAINING PROGRAM
Payer: COMMERCIAL

## 2024-11-03 DIAGNOSIS — M79.601 RIGHT ARM PAIN: Primary | ICD-10-CM

## 2024-11-03 DIAGNOSIS — M79.89 SWELLING OF ARM: ICD-10-CM

## 2024-11-03 LAB
ALBUMIN SERPL BCP-MCNC: 4.6 G/DL (ref 3.5–5.2)
ALP SERPL-CCNC: 96 U/L (ref 55–135)
ALT SERPL W/O P-5'-P-CCNC: 33 U/L (ref 10–44)
ANION GAP SERPL CALC-SCNC: 10 MMOL/L (ref 8–16)
AST SERPL-CCNC: 19 U/L (ref 10–40)
BACTERIA #/AREA URNS HPF: NORMAL /HPF
BASOPHILS # BLD AUTO: 0.09 K/UL (ref 0–0.2)
BASOPHILS NFR BLD: 1 % (ref 0–1.9)
BILIRUB SERPL-MCNC: 0.4 MG/DL (ref 0.1–1)
BILIRUB UR QL STRIP: NEGATIVE
BUN SERPL-MCNC: 14 MG/DL (ref 6–20)
CALCIUM SERPL-MCNC: 9.2 MG/DL (ref 8.7–10.5)
CHLORIDE SERPL-SCNC: 99 MMOL/L (ref 95–110)
CK SERPL-CCNC: 187 U/L (ref 20–200)
CLARITY UR: CLEAR
CO2 SERPL-SCNC: 26 MMOL/L (ref 23–29)
COLOR UR: COLORLESS
CREAT SERPL-MCNC: 0.9 MG/DL (ref 0.5–1.4)
CRP SERPL-MCNC: 0.6 MG/DL
DIFFERENTIAL METHOD BLD: ABNORMAL
EOSINOPHIL # BLD AUTO: 0.3 K/UL (ref 0–0.5)
EOSINOPHIL NFR BLD: 3.3 % (ref 0–8)
ERYTHROCYTE [DISTWIDTH] IN BLOOD BY AUTOMATED COUNT: 13.1 % (ref 11.5–14.5)
ERYTHROCYTE [SEDIMENTATION RATE] IN BLOOD BY WESTERGREN METHOD: 10 MM/HR (ref 0–10)
EST. GFR  (NO RACE VARIABLE): >60 ML/MIN/1.73 M^2
GLUCOSE SERPL-MCNC: 261 MG/DL (ref 70–110)
GLUCOSE UR QL STRIP: ABNORMAL
HCT VFR BLD AUTO: 42.6 % (ref 40–54)
HGB BLD-MCNC: 14 G/DL (ref 14–18)
HGB UR QL STRIP: NEGATIVE
IMM GRANULOCYTES # BLD AUTO: 0.23 K/UL (ref 0–0.04)
IMM GRANULOCYTES NFR BLD AUTO: 2.7 % (ref 0–0.5)
KETONES UR QL STRIP: NEGATIVE
LDH SERPL L TO P-CCNC: 1.27 MMOL/L (ref 0.5–2.2)
LEUKOCYTE ESTERASE UR QL STRIP: NEGATIVE
LYMPHOCYTES # BLD AUTO: 1.8 K/UL (ref 1–4.8)
LYMPHOCYTES NFR BLD: 20.9 % (ref 18–48)
MCH RBC QN AUTO: 30.1 PG (ref 27–31)
MCHC RBC AUTO-ENTMCNC: 32.9 G/DL (ref 32–36)
MCV RBC AUTO: 92 FL (ref 82–98)
MICROSCOPIC COMMENT: NORMAL
MONOCYTES # BLD AUTO: 0.8 K/UL (ref 0.3–1)
MONOCYTES NFR BLD: 9.2 % (ref 4–15)
NEUTROPHILS # BLD AUTO: 5.4 K/UL (ref 1.8–7.7)
NEUTROPHILS NFR BLD: 62.9 % (ref 38–73)
NITRITE UR QL STRIP: NEGATIVE
NRBC BLD-RTO: 0 /100 WBC
PH UR STRIP: 7 [PH] (ref 5–8)
PLATELET # BLD AUTO: 217 K/UL (ref 150–450)
PMV BLD AUTO: 11.2 FL (ref 9.2–12.9)
POTASSIUM SERPL-SCNC: 4.1 MMOL/L (ref 3.5–5.1)
PROT SERPL-MCNC: 7.4 G/DL (ref 6–8.4)
PROT UR QL STRIP: NEGATIVE
RBC # BLD AUTO: 4.65 M/UL (ref 4.6–6.2)
RBC #/AREA URNS HPF: 0 /HPF (ref 0–4)
SAMPLE: NORMAL
SODIUM SERPL-SCNC: 135 MMOL/L (ref 136–145)
SP GR UR STRIP: 1.02 (ref 1–1.03)
SQUAMOUS #/AREA URNS HPF: 2 /HPF
URN SPEC COLLECT METH UR: ABNORMAL
UROBILINOGEN UR STRIP-ACNC: NEGATIVE EU/DL
WBC # BLD AUTO: 8.61 K/UL (ref 3.9–12.7)
WBC #/AREA URNS HPF: 0 /HPF (ref 0–5)
YEAST URNS QL MICRO: NORMAL

## 2024-11-03 PROCEDURE — 63600175 PHARM REV CODE 636 W HCPCS

## 2024-11-03 PROCEDURE — 25500020 PHARM REV CODE 255: Performed by: STUDENT IN AN ORGANIZED HEALTH CARE EDUCATION/TRAINING PROGRAM

## 2024-11-03 PROCEDURE — 85651 RBC SED RATE NONAUTOMATED: CPT

## 2024-11-03 PROCEDURE — 99285 EMERGENCY DEPT VISIT HI MDM: CPT | Mod: 25

## 2024-11-03 PROCEDURE — 81001 URINALYSIS AUTO W/SCOPE: CPT

## 2024-11-03 PROCEDURE — 96365 THER/PROPH/DIAG IV INF INIT: CPT

## 2024-11-03 PROCEDURE — 36415 COLL VENOUS BLD VENIPUNCTURE: CPT

## 2024-11-03 PROCEDURE — 80053 COMPREHEN METABOLIC PANEL: CPT

## 2024-11-03 PROCEDURE — 87040 BLOOD CULTURE FOR BACTERIA: CPT

## 2024-11-03 PROCEDURE — 86140 C-REACTIVE PROTEIN: CPT

## 2024-11-03 PROCEDURE — 85025 COMPLETE CBC W/AUTO DIFF WBC: CPT

## 2024-11-03 PROCEDURE — 96375 TX/PRO/DX INJ NEW DRUG ADDON: CPT

## 2024-11-03 PROCEDURE — 82550 ASSAY OF CK (CPK): CPT

## 2024-11-03 PROCEDURE — 25000003 PHARM REV CODE 250

## 2024-11-03 RX ORDER — ACETAMINOPHEN 325 MG/1
650 TABLET ORAL EVERY 4 HOURS PRN
Status: DISCONTINUED | OUTPATIENT
Start: 2024-11-04 | End: 2024-11-04 | Stop reason: HOSPADM

## 2024-11-03 RX ORDER — SODIUM CHLORIDE 0.9 % (FLUSH) 0.9 %
2 SYRINGE (ML) INJECTION EVERY 8 HOURS PRN
Status: DISCONTINUED | OUTPATIENT
Start: 2024-11-04 | End: 2024-11-04 | Stop reason: HOSPADM

## 2024-11-03 RX ORDER — LANOLIN ALCOHOL/MO/W.PET/CERES
800 CREAM (GRAM) TOPICAL
Status: DISCONTINUED | OUTPATIENT
Start: 2024-11-04 | End: 2024-11-04 | Stop reason: HOSPADM

## 2024-11-03 RX ORDER — HYDROMORPHONE HYDROCHLORIDE 1 MG/ML
0.5 INJECTION, SOLUTION INTRAMUSCULAR; INTRAVENOUS; SUBCUTANEOUS EVERY 4 HOURS PRN
Status: DISCONTINUED | OUTPATIENT
Start: 2024-11-04 | End: 2024-11-04 | Stop reason: HOSPADM

## 2024-11-03 RX ORDER — IBUPROFEN 400 MG/1
400 TABLET ORAL EVERY 6 HOURS PRN
Status: DISCONTINUED | OUTPATIENT
Start: 2024-11-04 | End: 2024-11-04 | Stop reason: HOSPADM

## 2024-11-03 RX ORDER — IBUPROFEN 200 MG
24 TABLET ORAL
Status: DISCONTINUED | OUTPATIENT
Start: 2024-11-04 | End: 2024-11-04 | Stop reason: HOSPADM

## 2024-11-03 RX ORDER — MORPHINE SULFATE 2 MG/ML
2 INJECTION, SOLUTION INTRAMUSCULAR; INTRAVENOUS EVERY 4 HOURS PRN
Status: DISCONTINUED | OUTPATIENT
Start: 2024-11-04 | End: 2024-11-04 | Stop reason: HOSPADM

## 2024-11-03 RX ORDER — SODIUM,POTASSIUM PHOSPHATES 280-250MG
2 POWDER IN PACKET (EA) ORAL
Status: DISCONTINUED | OUTPATIENT
Start: 2024-11-04 | End: 2024-11-04 | Stop reason: HOSPADM

## 2024-11-03 RX ORDER — HYDROMORPHONE HYDROCHLORIDE 1 MG/ML
0.5 INJECTION, SOLUTION INTRAMUSCULAR; INTRAVENOUS; SUBCUTANEOUS
Status: COMPLETED | OUTPATIENT
Start: 2024-11-03 | End: 2024-11-03

## 2024-11-03 RX ORDER — ENOXAPARIN SODIUM 100 MG/ML
40 INJECTION SUBCUTANEOUS EVERY 24 HOURS
Status: DISCONTINUED | OUTPATIENT
Start: 2024-11-04 | End: 2024-11-03

## 2024-11-03 RX ORDER — INSULIN ASPART 100 [IU]/ML
0-5 INJECTION, SOLUTION INTRAVENOUS; SUBCUTANEOUS
Status: DISCONTINUED | OUTPATIENT
Start: 2024-11-04 | End: 2024-11-04 | Stop reason: HOSPADM

## 2024-11-03 RX ORDER — IBUPROFEN 200 MG
16 TABLET ORAL
Status: DISCONTINUED | OUTPATIENT
Start: 2024-11-04 | End: 2024-11-04 | Stop reason: HOSPADM

## 2024-11-03 RX ORDER — ENOXAPARIN SODIUM 100 MG/ML
60 INJECTION SUBCUTANEOUS EVERY 12 HOURS
Status: DISCONTINUED | OUTPATIENT
Start: 2024-11-04 | End: 2024-11-04 | Stop reason: HOSPADM

## 2024-11-03 RX ORDER — ASPIRIN 81 MG/1
81 TABLET ORAL DAILY
Status: DISCONTINUED | OUTPATIENT
Start: 2024-11-04 | End: 2024-11-04 | Stop reason: HOSPADM

## 2024-11-03 RX ORDER — FENOFIBRATE 160 MG/1
160 TABLET ORAL DAILY
Status: DISCONTINUED | OUTPATIENT
Start: 2024-11-04 | End: 2024-11-04 | Stop reason: HOSPADM

## 2024-11-03 RX ORDER — TALC
9 POWDER (GRAM) TOPICAL NIGHTLY PRN
Status: DISCONTINUED | OUTPATIENT
Start: 2024-11-04 | End: 2024-11-04 | Stop reason: HOSPADM

## 2024-11-03 RX ORDER — VANCOMYCIN HCL IN 5 % DEXTROSE 1G/250ML
1000 PLASTIC BAG, INJECTION (ML) INTRAVENOUS ONCE
Status: COMPLETED | OUTPATIENT
Start: 2024-11-03 | End: 2024-11-04

## 2024-11-03 RX ORDER — AMOXICILLIN 250 MG
1 CAPSULE ORAL 2 TIMES DAILY
Status: DISCONTINUED | OUTPATIENT
Start: 2024-11-04 | End: 2024-11-04 | Stop reason: HOSPADM

## 2024-11-03 RX ORDER — NALOXONE HCL 0.4 MG/ML
0.02 VIAL (ML) INJECTION
Status: DISCONTINUED | OUTPATIENT
Start: 2024-11-04 | End: 2024-11-04 | Stop reason: HOSPADM

## 2024-11-03 RX ORDER — LOSARTAN POTASSIUM 50 MG/1
50 TABLET ORAL DAILY
Status: DISCONTINUED | OUTPATIENT
Start: 2024-11-04 | End: 2024-11-04 | Stop reason: HOSPADM

## 2024-11-03 RX ORDER — GLUCAGON 1 MG
1 KIT INJECTION
Status: DISCONTINUED | OUTPATIENT
Start: 2024-11-04 | End: 2024-11-04 | Stop reason: HOSPADM

## 2024-11-03 RX ORDER — ATORVASTATIN CALCIUM 20 MG/1
20 TABLET, FILM COATED ORAL DAILY
Status: DISCONTINUED | OUTPATIENT
Start: 2024-11-04 | End: 2024-11-04 | Stop reason: HOSPADM

## 2024-11-03 RX ORDER — ONDANSETRON HYDROCHLORIDE 2 MG/ML
4 INJECTION, SOLUTION INTRAVENOUS EVERY 8 HOURS PRN
Status: DISCONTINUED | OUTPATIENT
Start: 2024-11-04 | End: 2024-11-04 | Stop reason: HOSPADM

## 2024-11-03 RX ADMIN — IOHEXOL 100 ML: 350 INJECTION, SOLUTION INTRAVENOUS at 11:11

## 2024-11-03 RX ADMIN — PIPERACILLIN SODIUM AND TAZOBACTAM SODIUM 3.38 G: 3; .375 INJECTION, POWDER, LYOPHILIZED, FOR SOLUTION INTRAVENOUS at 10:11

## 2024-11-03 RX ADMIN — HYDROMORPHONE HYDROCHLORIDE 0.5 MG: 0.5 INJECTION, SOLUTION INTRAMUSCULAR; INTRAVENOUS; SUBCUTANEOUS at 10:11

## 2024-11-03 NOTE — LETTER
November 4, 2024         [address]  [facility] [address]  Phone: [phone]  Fax: [phone]       Patient: Richi Soriano   YOB: 1975  Date of Visit: 11/04/2024    To Whom It May Concern:    Aurea Soriano  was at [facility] on 11/3/2024-11/04/2024. The patient may return to work/school on 11/11/2024 with no restrictions. If you have any questions or concerns, or if I can be of further assistance, please do not hesitate to contact me.    Sincerely,    Ludwig Key RN

## 2024-11-04 VITALS
TEMPERATURE: 98 F | BODY MASS INDEX: 47.74 KG/M2 | HEART RATE: 61 BPM | HEIGHT: 68 IN | WEIGHT: 315 LBS | OXYGEN SATURATION: 96 % | RESPIRATION RATE: 16 BRPM | SYSTOLIC BLOOD PRESSURE: 157 MMHG | DIASTOLIC BLOOD PRESSURE: 93 MMHG

## 2024-11-04 LAB
ANION GAP SERPL CALC-SCNC: 8 MMOL/L (ref 8–16)
BASOPHILS # BLD AUTO: 0.08 K/UL (ref 0–0.2)
BASOPHILS NFR BLD: 1 % (ref 0–1.9)
BUN SERPL-MCNC: 12 MG/DL (ref 6–20)
CALCIUM SERPL-MCNC: 8.7 MG/DL (ref 8.7–10.5)
CHLORIDE SERPL-SCNC: 99 MMOL/L (ref 95–110)
CO2 SERPL-SCNC: 24 MMOL/L (ref 23–29)
CREAT SERPL-MCNC: 0.8 MG/DL (ref 0.5–1.4)
DIFFERENTIAL METHOD BLD: ABNORMAL
EOSINOPHIL # BLD AUTO: 0.4 K/UL (ref 0–0.5)
EOSINOPHIL NFR BLD: 4.9 % (ref 0–8)
ERYTHROCYTE [DISTWIDTH] IN BLOOD BY AUTOMATED COUNT: 13.2 % (ref 11.5–14.5)
EST. GFR  (NO RACE VARIABLE): >60 ML/MIN/1.73 M^2
GLUCOSE SERPL-MCNC: 260 MG/DL (ref 70–110)
HCT VFR BLD AUTO: 40.9 % (ref 40–54)
HGB BLD-MCNC: 13.7 G/DL (ref 14–18)
IMM GRANULOCYTES # BLD AUTO: 0.23 K/UL (ref 0–0.04)
IMM GRANULOCYTES NFR BLD AUTO: 2.8 % (ref 0–0.5)
LYMPHOCYTES # BLD AUTO: 1.8 K/UL (ref 1–4.8)
LYMPHOCYTES NFR BLD: 21.6 % (ref 18–48)
MAGNESIUM SERPL-MCNC: 2 MG/DL (ref 1.6–2.6)
MCH RBC QN AUTO: 30.4 PG (ref 27–31)
MCHC RBC AUTO-ENTMCNC: 33.5 G/DL (ref 32–36)
MCV RBC AUTO: 91 FL (ref 82–98)
MONOCYTES # BLD AUTO: 0.8 K/UL (ref 0.3–1)
MONOCYTES NFR BLD: 9.9 % (ref 4–15)
NEUTROPHILS # BLD AUTO: 4.9 K/UL (ref 1.8–7.7)
NEUTROPHILS NFR BLD: 59.8 % (ref 38–73)
NRBC BLD-RTO: 0 /100 WBC
OHS QRS DURATION: 84 MS
OHS QTC CALCULATION: 437 MS
PHOSPHATE SERPL-MCNC: 3 MG/DL (ref 2.7–4.5)
PLATELET # BLD AUTO: 201 K/UL (ref 150–450)
PMV BLD AUTO: 11 FL (ref 9.2–12.9)
POCT GLUCOSE: 210 MG/DL (ref 70–110)
POCT GLUCOSE: 237 MG/DL (ref 70–110)
POTASSIUM SERPL-SCNC: 3.9 MMOL/L (ref 3.5–5.1)
RBC # BLD AUTO: 4.5 M/UL (ref 4.6–6.2)
SODIUM SERPL-SCNC: 131 MMOL/L (ref 136–145)
WBC # BLD AUTO: 8.18 K/UL (ref 3.9–12.7)

## 2024-11-04 PROCEDURE — 83735 ASSAY OF MAGNESIUM: CPT | Performed by: STUDENT IN AN ORGANIZED HEALTH CARE EDUCATION/TRAINING PROGRAM

## 2024-11-04 PROCEDURE — 99900031 HC PATIENT EDUCATION (STAT)

## 2024-11-04 PROCEDURE — 80048 BASIC METABOLIC PNL TOTAL CA: CPT | Performed by: STUDENT IN AN ORGANIZED HEALTH CARE EDUCATION/TRAINING PROGRAM

## 2024-11-04 PROCEDURE — 84100 ASSAY OF PHOSPHORUS: CPT | Performed by: STUDENT IN AN ORGANIZED HEALTH CARE EDUCATION/TRAINING PROGRAM

## 2024-11-04 PROCEDURE — 99900035 HC TECH TIME PER 15 MIN (STAT)

## 2024-11-04 PROCEDURE — 96366 THER/PROPH/DIAG IV INF ADDON: CPT

## 2024-11-04 PROCEDURE — 63600175 PHARM REV CODE 636 W HCPCS

## 2024-11-04 PROCEDURE — 25000003 PHARM REV CODE 250: Performed by: NURSE PRACTITIONER

## 2024-11-04 PROCEDURE — G0378 HOSPITAL OBSERVATION PER HR: HCPCS

## 2024-11-04 PROCEDURE — 94660 CPAP INITIATION&MGMT: CPT

## 2024-11-04 PROCEDURE — 25000242 PHARM REV CODE 250 ALT 637 W/ HCPCS: Performed by: STUDENT IN AN ORGANIZED HEALTH CARE EDUCATION/TRAINING PROGRAM

## 2024-11-04 PROCEDURE — 96376 TX/PRO/DX INJ SAME DRUG ADON: CPT

## 2024-11-04 PROCEDURE — 86038 ANTINUCLEAR ANTIBODIES: CPT | Performed by: EMERGENCY MEDICINE

## 2024-11-04 PROCEDURE — 25000003 PHARM REV CODE 250: Performed by: STUDENT IN AN ORGANIZED HEALTH CARE EDUCATION/TRAINING PROGRAM

## 2024-11-04 PROCEDURE — 94761 N-INVAS EAR/PLS OXIMETRY MLT: CPT

## 2024-11-04 PROCEDURE — 96372 THER/PROPH/DIAG INJ SC/IM: CPT | Performed by: STUDENT IN AN ORGANIZED HEALTH CARE EDUCATION/TRAINING PROGRAM

## 2024-11-04 PROCEDURE — 63600175 PHARM REV CODE 636 W HCPCS: Performed by: STUDENT IN AN ORGANIZED HEALTH CARE EDUCATION/TRAINING PROGRAM

## 2024-11-04 PROCEDURE — 96367 TX/PROPH/DG ADDL SEQ IV INF: CPT

## 2024-11-04 PROCEDURE — 25000003 PHARM REV CODE 250

## 2024-11-04 PROCEDURE — 94799 UNLISTED PULMONARY SVC/PX: CPT

## 2024-11-04 PROCEDURE — 36415 COLL VENOUS BLD VENIPUNCTURE: CPT | Performed by: EMERGENCY MEDICINE

## 2024-11-04 PROCEDURE — 85025 COMPLETE CBC W/AUTO DIFF WBC: CPT | Performed by: STUDENT IN AN ORGANIZED HEALTH CARE EDUCATION/TRAINING PROGRAM

## 2024-11-04 PROCEDURE — 96375 TX/PRO/DX INJ NEW DRUG ADDON: CPT

## 2024-11-04 RX ORDER — METHYLPREDNISOLONE 4 MG/1
TABLET ORAL
Qty: 21 EACH | Refills: 0 | Status: SHIPPED | OUTPATIENT
Start: 2024-11-04 | End: 2024-11-25

## 2024-11-04 RX ORDER — HYDROCODONE BITARTRATE AND ACETAMINOPHEN 5; 325 MG/1; MG/1
1 TABLET ORAL EVERY 6 HOURS PRN
Status: DISCONTINUED | OUTPATIENT
Start: 2024-11-04 | End: 2024-11-04 | Stop reason: HOSPADM

## 2024-11-04 RX ORDER — HYDROCODONE BITARTRATE AND ACETAMINOPHEN 5; 325 MG/1; MG/1
1 TABLET ORAL EVERY 6 HOURS PRN
Qty: 14 TABLET | Refills: 0 | Status: SHIPPED | OUTPATIENT
Start: 2024-11-04

## 2024-11-04 RX ADMIN — MORPHINE SULFATE 2 MG: 2 INJECTION, SOLUTION INTRAMUSCULAR; INTRAVENOUS at 06:11

## 2024-11-04 RX ADMIN — LOSARTAN POTASSIUM 50 MG: 50 TABLET, FILM COATED ORAL at 08:11

## 2024-11-04 RX ADMIN — DEXTROSE MONOHYDRATE 1000 MG: 50 INJECTION, SOLUTION INTRAVENOUS at 01:11

## 2024-11-04 RX ADMIN — INSULIN ASPART 2 UNITS: 100 INJECTION, SOLUTION INTRAVENOUS; SUBCUTANEOUS at 11:11

## 2024-11-04 RX ADMIN — FENOFIBRATE 160 MG: 160 TABLET ORAL at 08:11

## 2024-11-04 RX ADMIN — HYDROCODONE BITARTRATE AND ACETAMINOPHEN 1 TABLET: 5; 325 TABLET ORAL at 11:11

## 2024-11-04 RX ADMIN — SENNOSIDES AND DOCUSATE SODIUM 1 TABLET: 8.6; 5 TABLET ORAL at 08:11

## 2024-11-04 RX ADMIN — ASPIRIN 81 MG: 81 TABLET, COATED ORAL at 08:11

## 2024-11-04 RX ADMIN — ENOXAPARIN SODIUM 60 MG: 60 INJECTION SUBCUTANEOUS at 08:11

## 2024-11-04 RX ADMIN — INSULIN ASPART 2 UNITS: 100 INJECTION, SOLUTION INTRAVENOUS; SUBCUTANEOUS at 08:11

## 2024-11-04 RX ADMIN — MORPHINE SULFATE 2 MG: 2 INJECTION, SOLUTION INTRAMUSCULAR; INTRAVENOUS at 01:11

## 2024-11-04 NOTE — HOSPITAL COURSE
She was monitored closely during his stay.  He had persistent right arm pain.  He underwent CTA of the arm which was negative for any occlusion, dissection, or other abnormal finding with findings only of trace nonspecific soft tissue edema.  He underwent MRI which again showed small amount of soft tissue, without any other significant findings.  He was afebrile with no signs of cellulitis.  His case was discussed with orthopedist recommended Medrol Dosepak, arm elevation, and can follow closely in clinic with orthopedic for ongoing evaluation management.  He was counseled on the effects of steroids on his glucose which he will monitored closely on discharge.  He was instructed to discontinue steroid for controlled hyperglycemia.  He was instructed to elevate the upper extremity.  Instructed to take pain medication while requiring short course of oral narcotics.  He was given a work excuse.  He will follow closely in a few days with orthopedist.    Pt was seen and examined on day of discharge and deemed appropriate for discharge.  Pt agreed with plan of care.

## 2024-11-04 NOTE — DISCHARGE SUMMARY
[facility] Medicine  Discharge Summary      Patient Name: Richi Soriano  MRN: [id number]  [facility]: [id number]  Patient Class: OP- Observation  Admission Date: 11/3/2024  Hospital Length of Stay: 0 days  Discharge Date and Time:  11/04/2024 1:32 PM  Attending Physician: Jesus Conroy MD   Discharging Provider: Ting Landry NP  Primary Care Provider: Nancy Hollis FNP-CODIE    Primary Care Team: Networked reference to record PCT     HPI:   48 year old male with PMH of diabetes type 2, JANKI on CPAP, hyperlipidemia, hypertension presents with pain in right arm extending to the hand for 1 day.  Symptoms started yesterday morning. Pain was located along medial forearm, extending down to the hand.  Denies numbness but has weakness due to pain (unable to make fist with right hand).  Areas along forearm feel tense compared to left arm.  Denies trauma, recent travel, insect bites, previous occurrences, fevers, new rashes.  Patient tried motrin which did not help, compressive devices made symptoms worse.  Denies family history of autoimmune diseases.    Patient has been taking atorvastatin and fenofibrate for over a year.  Patient had a coronary angiogram via right radial approach 1 month ago ([facility]?).  Reports angio did not reveal any significant vessel occlusions.       ESR/CRP, CPK, and WBC are normal.  Given vanco and zosyn ordered in ED.      * No surgery found *      Hospital Course:   She was monitored closely during his stay.  He had persistent right arm pain.  He underwent CTA of the arm which was negative for any occlusion, dissection, or other abnormal finding with findings only of trace nonspecific soft tissue edema.  He underwent MRI which again showed small amount of soft tissue, without any other significant findings.  He was afebrile with no signs of cellulitis.  His case was discussed with orthopedist recommended Medrol Dosepak, arm elevation, and can follow  "closely in clinic with orthopedic for ongoing evaluation management.  He was counseled on the effects of steroids on his glucose which he will monitored closely on discharge.  He was instructed to discontinue steroid for controlled hyperglycemia.  He was instructed to elevate the upper extremity.  Instructed to take pain medication while requiring short course of oral narcotics.  He was given a work excuse.  He will follow closely in a few days with orthopedist.    Pt was seen and examined on day of discharge and deemed appropriate for discharge.  Pt agreed with plan of care.     Goals of Care Treatment Preferences:  Code Status: Full Code      SDOH Screening:  The patient declined to be screened for utility difficulties, food insecurity, transport difficulties, housing insecurity, and interpersonal safety, so no concerns could be identified this admission.     Consults:   Consults (From admission, onward)          Status Ordering Provider     Pharmacy to dose Vancomycin consult  Once        Provider:  (Not yet assigned)   Placed in "And" Linked Group    Acknowledged TYRON SALINAS            No new Assessment & Plan notes have been filed under this hospital service since the last note was generated.  Service: Hospital Medicine    Final Active Diagnoses:    Diagnosis Date Noted POA    PRINCIPAL PROBLEM:  Right arm pain [M79.601] 11/03/2024 Yes    JANKI (obstructive sleep apnea) [G47.33] 08/18/2022 Yes    Diabetes [E11.9] 08/18/2022 Yes    Hypertension [I10] 08/18/2022 Yes    Hyperlipidemia [E78.5] 07/16/2018 Yes      Problems Resolved During this Admission:       Discharged Condition: good    Disposition: Home or Self Care    Follow Up:   Follow-up Information       Bob Blanton PA-C Follow up on 11/7/2024.    Specialty: Orthopedic Surgery  Contact information:  [address]  [address]  [facility] [address]  [facility] [address]  [phone]               Finger, Mando, MD. Go on 11/5/2024.    Specialties: " Orthopedic Surgery, Surgery, Sports Medicine  Why: 11/5/2024 11:00 AM  Contact information:  [address] [facility]  [address]  [place] [address]  [phone]               Nancy Hollis FNP-C. Go on 11/6/2024.    Specialty: Family Medicine  Why: Previously scheduled appointment 11/06/24 at 9:20  Contact information:  [address] [facility]  [address]  [place] [address]  [phone]                           Patient Instructions:      Diet diabetic     Notify your health care provider if you experience any of the following:  persistent nausea and vomiting or diarrhea     Notify your health care provider if you experience any of the following:  temperature >100.4     Notify your health care provider if you experience any of the following:  redness, tenderness, or signs of infection (pain, swelling, redness, odor or green/yellow discharge around incision site)     Notify your health care provider if you experience any of the following:  difficulty breathing or increased cough     Notify your health care provider if you experience any of the following:  persistent dizziness, light-headedness, or visual disturbances     Notify your health care provider if you experience any of the following:  increased confusion or weakness     Activity as tolerated       Significant Diagnostic Studies: Labs: CMP   Recent Labs   Lab 11/03/24 2105 11/04/24 0419   * 131*   K 4.1 3.9   CL 99 99   CO2 26 24   * 260*   BUN 14 12   CREATININE 0.9 0.8   CALCIUM 9.2 8.7   PROT 7.4  --    ALBUMIN 4.6  --    BILITOT 0.4  --    ALKPHOS 96  --    AST 19  --    ALT 33  --    ANIONGAP 10 8    and CBC   Recent Labs   Lab 11/03/24 2105 11/04/24 0419   WBC 8.61 8.18   HGB 14.0 13.7*   HCT 42.6 40.9    201     Imaging Results              MRI Forearm Without Contrast Right (Final result)  Result time 11/04/24 10:51:28      Final result by Williams Rodriguez MD (11/04/24 10:51:28)                   Impression:      Nonspecific very mild  subcutaneous edema and dorsal right forearm.      Electronically signed by: Williams Rodriguez  Date:    11/04/2024  Time:    10:51               Narrative:    EXAMINATION:  MRI FOREARM WITHOUT CONTRAST RIGHT    CLINICAL HISTORY:  pain;    TECHNIQUE:  Right forearm, with attention to mid and distal forearm, MRI without IV contrast.    COMPARISON:  CTA, radiographs, and ultrasound 11/03/2024    FINDINGS:  Bone marrow signal is normal.  Physiologic amount of fluid lies in distal radioulnar, radiocarpal, and midcarpal joints.    Very mild subcutaneous edema diffusely affects the dorsal right forearm.    Muscles maintain normal signal.  The visualized lung tendons are intact, without associated peritendinous fluid.    No solid or cystic mass.                                       CTA Upper Extremity Right (Final result)  Result time 11/04/24 00:38:09      Final result by Sal Hall MD (11/04/24 00:38:09)                   Impression:      No CTA evidence of high-grade stenosis, occlusion, or pseudoaneurysm of the right upper extremity arterial vasculature.  Further evaluation and follow-up as warranted.    Nonspecific soft tissue fat stranding throughout the right forearm.  No evidence of discrete drainable abscess or soft tissue gas.      Electronically signed by: Sal Hall MD  Date:    11/04/2024  Time:    00:38               Narrative:    EXAMINATION:  CTA UPPER EXTREMITY RIGHT    CLINICAL HISTORY:  painful extremity s/p coronary angiogram;    TECHNIQUE:  1 mm axial images were acquired of the right upper extremity following the administration of 100 cc Omnipaque 350 IV contrast per CTA protocol.    COMPARISON:  Right upper extremity venous ultrasound 11/03/2024, CTA chest 08/17/2022    FINDINGS:  The right upper extremity arterial vasculature appears grossly patent without definite CTA evidence of occlusion or high-grade stenosis.  No discrete pseudoaneurysm identified.    There is no evidence of acute  fracture or dislocation of the right upper extremity.  There is mild nonspecific subcutaneous fat stranding throughout the right forearm.  No evidence of soft tissue gas.  No discrete drainable fluid collection.  No intramuscular fluid collections identified..                                       X-Ray Hand 3 View Right (Final result)  Result time 11/03/24 22:37:08      Final result by Sal Hall MD (11/03/24 22:37:08)                   Impression:      No radiographic evidence of acute displaced fracture of the right forearm and hand.  Mild nonspecific soft tissue edema without discrete retained radiopaque foreign body.      Electronically signed by: Sal Hall MD  Date:    11/03/2024  Time:    22:37               Narrative:    EXAMINATION:  XR FOREARM RIGHT; XR HAND COMPLETE 3 VIEW RIGHT    CLINICAL HISTORY:  swelling;Other specified soft tissue disorders    TECHNIQUE:  AP and lateral views of the right forearm were performed.  Three views of the right hand were performed.    COMPARISON:  None    FINDINGS:  There is no radiographic evidence of acute displaced fracture of the right forearm and right hand.  Alignment appears within normal limits without evidence of dislocation.  Joint spaces appear relatively well maintained.  No aggressive cortical destruction or periosteal reaction identified.  No retained radiopaque foreign body identified in the visualized soft tissues.  There is mild nonspecific subcutaneous edema.                                       X-Ray Forearm Right (Final result)  Result time 11/03/24 22:37:08      Final result by Sal Hall MD (11/03/24 22:37:08)                   Impression:      No radiographic evidence of acute displaced fracture of the right forearm and hand.  Mild nonspecific soft tissue edema without discrete retained radiopaque foreign body.      Electronically signed by: Sal Hall MD  Date:    11/03/2024  Time:    22:37               Narrative:     EXAMINATION:  XR FOREARM RIGHT; XR HAND COMPLETE 3 VIEW RIGHT    CLINICAL HISTORY:  swelling;Other specified soft tissue disorders    TECHNIQUE:  AP and lateral views of the right forearm were performed.  Three views of the right hand were performed.    COMPARISON:  None    FINDINGS:  There is no radiographic evidence of acute displaced fracture of the right forearm and right hand.  Alignment appears within normal limits without evidence of dislocation.  Joint spaces appear relatively well maintained.  No aggressive cortical destruction or periosteal reaction identified.  No retained radiopaque foreign body identified in the visualized soft tissues.  There is mild nonspecific subcutaneous edema.                                       US Upper Extremity Veins Right (Final result)  Result time 11/03/24 20:44:47      Final result by Sal Hall MD (11/03/24 20:44:47)                   Impression:      No thrombus in central veins of the right upper extremity.      Electronically signed by: Sal Hall MD  Date:    11/03/2024  Time:    20:44               Narrative:    EXAMINATION:  US UPPER EXTREMITY VEINS RIGHT    CLINICAL HISTORY:  DVT Rule out;    TECHNIQUE:  Duplex and color flow Doppler evaluation and dynamic compression was performed of the right upper extremity veins.    COMPARISON:  None    FINDINGS:  Central veins: The internal jugular, subclavian, and axillary veins are patent and free of thrombus.    Arm veins: The brachial, basilic, and cephalic veins are patent and compressible.    Other findings: None.                                      Pending Diagnostic Studies:       None           Medications:  Reconciled Home Medications:      Medication List        START taking these medications      HYDROcodone-acetaminophen 5-325 mg per tablet  Commonly known as: NORCO  Take 1 tablet by mouth every 6 (six) hours as needed for Pain.     methylPREDNISolone 4 mg tablet  Commonly known as: MEDROL  DOSEPACK  use as directed            CHANGE how you take these medications      glipiZIDE 10 MG Tr24  Commonly known as: GLUCOTROL  TAKE 1 TABLET(10 MG) BY MOUTH DAILY WITH BREAKFAST  What changed:   when to take this  additional instructions     metFORMIN 750 MG ER 24hr tablet  Commonly known as: GLUCOPHAGE-XR  TAKE 2 TABLETS(1500 MG) BY MOUTH DAILY WITH BREAKFAST  What changed: See the new instructions.            CONTINUE taking these medications      aspirin 81 MG EC tablet  Commonly known as: ECOTRIN  Take 1 tablet (81 mg total) by mouth once daily.     atorvastatin 20 MG tablet  Commonly known as: LIPITOR  Take 1 tablet (20 mg total) by mouth once daily.     fenofibrate 160 MG Tab  TAKE 1 TABLET(160 MG) BY MOUTH EVERY DAY     JARDIANCE 25 mg tablet  Generic drug: empagliflozin  TAKE 1 TABLET(25 MG) BY MOUTH EVERY DAY     losartan 50 MG tablet  Commonly known as: COZAAR  TAKE 1 TABLET(50 MG) BY MOUTH EVERY DAY     MOUNJARO 7.5 mg/0.5 mL Pnij  Generic drug: tirzepatide  ADMINISTER 7.5 MG UNDER THE SKIN EVERY 7 DAYS              Indwelling Lines/Drains at time of discharge:   Lines/Drains/Airways       None                   Time spent on the discharge of patient: 33 minutes         Ting Landry NP  Department of Hospital Medicine  [facility]

## 2024-11-04 NOTE — PROGRESS NOTES
VANCOMYCIN PHARMACOKINETIC NOTE:  Vancomycin Day # 1    Objective/Assessment:    Diagnosis/Indication for Vancomycin:Bone/Joint infection      48 y.o., male; Actual Body Weight = (!) 159.7 kg (352 lb).    The patient has the following labs:  11/4/2024 Estimated Creatinine Clearance: 145.7 mL/min (based on SCr of 0.9 mg/dL). Lab Results   Component Value Date    BUN 14 11/03/2024     Lab Results   Component Value Date    WBC 8.61 11/03/2024          Plan:  Adjust vancomycin dose and/or frequency based on the patient's actual weight and renal function:  Initiate Vancomycin 2000 mg IV every 12 hours.  Orders have been entered into patient's chart.        Vancomycin trough level has been ordered for 1300 on 11/05.    Pharmacy will manage vancomycin therapy, monitor serum vancomycin levels, monitor renal function and adjust regimen as necessary.    Thank you for allowing us to participate in this patient's care.     Cece Soriano 11/4/2024   Department of Pharmacy  Ext [phone]

## 2024-11-04 NOTE — PLAN OF CARE
Problem: Adult Inpatient Plan of Care  Goal: Plan of Care Review  Outcome: Progressing  Goal: Patient-Specific Goal (Individualized)  Outcome: Progressing  Goal: Absence of Hospital-Acquired Illness or Injury  Outcome: Progressing  Goal: Optimal Comfort and Wellbeing  Outcome: Progressing  Goal: Readiness for Transition of Care  Outcome: Progressing     Problem: Bariatric Environmental Safety  Goal: Safety Maintained with Care  Outcome: Progressing     Problem: Diabetes Comorbidity  Goal: Blood Glucose Level Within Targeted Range  Outcome: Progressing     Problem: Fall Injury Risk  Goal: Absence of Fall and Fall-Related Injury  Outcome: Progressing     Problem: Pain Acute  Goal: Optimal Pain Control and Function  Outcome: Progressing     Problem: Infection  Goal: Absence of Infection Signs and Symptoms  Outcome: Progressing

## 2024-11-04 NOTE — ASSESSMENT & PLAN NOTE
"Patient's FSGs are controlled on current medication regimen.  Last A1c reviewed-   Lab Results   Component Value Date    HGBA1C 7.0 (H) 12/19/2023     Most recent fingerstick glucose reviewed- No results for input(s): "POCTGLUCOSE" in the last 24 hours.  Current correctional scale  Low  Maintain anti-hyperglycemic dose as follows-   Antihyperglycemics (From admission, onward)      Start     Stop Route Frequency Ordered    11/04/24 0046  insulin aspart U-100 pen 0-5 Units         -- SubQ Before meals & nightly PRN 11/03/24 2347          Hold Oral hypoglycemics while patient is in the hospital.  "

## 2024-11-04 NOTE — H&P
"  [facility] - Emergency Dept  Hospital Medicine  History & Physical    Patient Name: Richi Soriano  MRN: [id number]  Patient Class: OP- Observation  Admission Date: 11/3/2024  Attending Physician: Beronica Mirza MD   Primary Care Provider: Nancy Hollis FNP-C         Patient information was obtained from patient and ER records.     Subjective:     Principal Problem:Right arm pain    Chief Complaint:   Chief Complaint   Patient presents with    Arm Pain     Reports "right arm  pain and swelling for two days" unk etiology / no trauma / + edema to right hand region / neuro intact        HPI: 48 year old male with PMH of diabetes type 2, JANKI on CPAP, hyperlipidemia, hypertension presents with pain in right arm extending to the hand for 1 day.  Symptoms started yesterday morning. Pain was located along medial forearm, extending down to the hand.  Denies numbness but has weakness due to pain (unable to make fist with right hand).  Areas along forearm feel tense compared to left arm.  Denies trauma, recent travel, insect bites, previous occurrences, fevers, new rashes.  Patient tried motrin which did not help, compressive devices made symptoms worse.  Denies family history of autoimmune diseases.    Patient has been taking atorvastatin and fenofibrate for over a year.  Patient had a coronary angiogram via right radial approach 1 month ago ([facility]  Reports angio did not reveal any significant vessel occlusions.       ESR/CRP, CPK, and WBC are normal.  Given vanco and zosyn ordered in ED.      Past Medical History:   Diagnosis Date    Diabetes mellitus, type 2     Diverticulitis of colon     Hyperlipidemia     Hypertension     Sleep apnea        Past Surgical History:   Procedure Laterality Date    ABDOMINAL SURGERY      APPENDECTOMY      COLON SURGERY      HERNIA REPAIR         Review of patient's allergies indicates:  No Known Allergies    No current facility-administered " medications on file prior to encounter.     Current Outpatient Medications on File Prior to Encounter   Medication Sig    aspirin (ECOTRIN) 81 MG EC tablet Take 1 tablet (81 mg total) by mouth once daily.    atorvastatin (LIPITOR) 20 MG tablet Take 1 tablet (20 mg total) by mouth once daily.    cholecalciferol, vitamin D3, 1,250 mcg (50,000 unit) capsule Take 1 capsule by mouth every 7 days. MONDAYS    fenofibrate 160 MG Tab TAKE 1 TABLET(160 MG) BY MOUTH EVERY DAY    glipiZIDE (GLUCOTROL) 10 MG TR24 TAKE 1 TABLET(10 MG) BY MOUTH DAILY WITH BREAKFAST    JARDIANCE 25 mg tablet TAKE 1 TABLET(25 MG) BY MOUTH EVERY DAY    losartan (COZAAR) 50 MG tablet TAKE 1 TABLET(50 MG) BY MOUTH EVERY DAY    metFORMIN (GLUCOPHAGE-XR) 750 MG ER 24hr tablet TAKE 2 TABLETS(1500 MG) BY MOUTH DAILY WITH BREAKFAST    tirzepatide (MOUNJARO) 7.5 mg/0.5 mL PnIj ADMINISTER 7.5 MG UNDER THE SKIN EVERY 7 DAYS (Patient taking differently: Inject 7.5 mg into the skin once a week.)     Family History    Family history is unknown by patient.       Tobacco Use    Smoking status: Former     Current packs/day: 0.50     Average packs/day: 0.5 packs/day for 10.0 years (5.0 ttl pk-yrs)     Types: Cigarettes     Passive exposure: Past    Smokeless tobacco: Never   Substance and Sexual Activity    Alcohol use: No     Comment: ocassionally    Drug use: No    Sexual activity: Yes     Partners: Female     Review of Systems   Constitutional:  Negative for chills, fatigue and fever.   HENT:  Negative for ear pain, sinus pain and sore throat.    Eyes:  Negative for visual disturbance.   Respiratory:  Negative for cough, chest tightness, shortness of breath and wheezing.    Cardiovascular:  Negative for chest pain, palpitations and leg swelling.   Gastrointestinal:  Negative for abdominal distention, constipation, nausea and vomiting.   Endocrine: Negative for polyuria.   Genitourinary:  Negative for dysuria and hematuria.   Musculoskeletal:  Positive for  myalgias.   Skin:  Negative for pallor and rash.   Neurological:  Positive for weakness. Negative for dizziness, numbness and headaches.   Psychiatric/Behavioral:  Negative for agitation.      Objective:     Vital Signs (Most Recent):  Temp: 98.1 °F (36.7 °C) (11/03/24 1901)  Pulse: 100 (11/03/24 1901)  Resp: 16 (11/03/24 2255)  BP: (!) 156/77 (11/03/24 1901)  SpO2: 98 % (11/03/24 1901) Vital Signs (24h Range):  Temp:  [98.1 °F (36.7 °C)] 98.1 °F (36.7 °C)  Pulse:  [100] 100  Resp:  [16] 16  SpO2:  [98 %] 98 %  BP: (156)/(77) 156/77     Weight: (!) 158.8 kg (350 lb)  Body mass index is 53.22 kg/m².     Physical Exam  Constitutional:       General: He is not in acute distress.     Appearance: Normal appearance. He is obese. He is not toxic-appearing.   HENT:      Head: Normocephalic and atraumatic.      Nose: No rhinorrhea.      Mouth/Throat:      Mouth: Mucous membranes are moist.      Pharynx: No oropharyngeal exudate.   Eyes:      General: No scleral icterus.     Extraocular Movements: Extraocular movements intact.      Pupils: Pupils are equal, round, and reactive to light.   Cardiovascular:      Rate and Rhythm: Normal rate and regular rhythm.      Pulses: Normal pulses.      Heart sounds: Normal heart sounds. No murmur heard.  Pulmonary:      Effort: Pulmonary effort is normal. No respiratory distress.      Breath sounds: Normal breath sounds. No wheezing or rales.   Abdominal:      General: There is no distension.      Palpations: Abdomen is soft.      Tenderness: There is no abdominal tenderness.   Musculoskeletal:         General: Swelling and tenderness present.      Comments: Patient has tenderness to palpation along right medial forearm and right hand, +muscle tense along same area   Skin:     Coloration: Skin is not jaundiced.      Findings: No bruising, erythema or rash.   Neurological:      General: No focal deficit present.      Mental Status: He is alert and oriented to person, place, and time.  "  Psychiatric:         Mood and Affect: Mood normal.         Behavior: Behavior normal.              CRANIAL NERVES     CN III, IV, VI   Pupils are equal, round, and reactive to light.       Significant Labs: All pertinent labs within the past 24 hours have been reviewed.  BMP:   Recent Labs   Lab 11/03/24  2105   *   *   K 4.1   CL 99   CO2 26   BUN 14   CREATININE 0.9   CALCIUM 9.2     CBC:   Recent Labs   Lab 11/03/24 2105   WBC 8.61   HGB 14.0   HCT 42.6          Significant Imaging: I have reviewed all pertinent imaging results/findings within the past 24 hours.  Assessment/Plan:     * Right arm pain  Patient presented with 1 day history of right arm pain, swelling from the elbow to hand. On exam right radial pulse is weaker than left.  Patient had recent coronary angiogram with right radial approach about 1 month ago.  Suspect radial artery occlusion (SAMS).      CT angio right upper extremity  MRI right upper extremity - could discontinue if CTA is positive  Consider anticoagulation vs endovascular intervention if SAMS   Pain control PRN  Continue aspirin           Hyperlipidemia  CPK normal    Continue fenofibrate, atorvastatin       Hypertension  Patients blood pressure range in the last 24 hours was: BP  Min: 156/77  Max: 156/77.The patient's inpatient anti-hypertensive regimen is listed below:  Current Antihypertensives  losartan tablet 50 mg, Daily, Oral    Plan  - BP is controlled, no changes needed to their regimen      Diabetes  Patient's FSGs are controlled on current medication regimen.  Last A1c reviewed-   Lab Results   Component Value Date    HGBA1C 7.0 (H) 12/19/2023     Most recent fingerstick glucose reviewed- No results for input(s): "POCTGLUCOSE" in the last 24 hours.  Current correctional scale  Low  Maintain anti-hyperglycemic dose as follows-   Antihyperglycemics (From admission, onward)      Start     Stop Route Frequency Ordered    11/04/24 0046  insulin aspart U-100 " pen 0-5 Units         -- SubQ Before meals & nightly PRN 11/03/24 2347          Hold Oral hypoglycemics while patient is in the hospital.    JANKI (obstructive sleep apnea)  Continue CPAP        VTE Risk Mitigation (From admission, onward)           Ordered     enoxaparin injection 60 mg  Every 12 hours        Note to Pharmacy: Ht:    Wt: (!) 158.8 kg (350 lb)  Estimated Creatinine Clearance: 145.3 mL/min (based on SCr of 0.9 mg/dL).  Body mass index is 53.22 kg/m².    11/03/24 2350     IP VTE HIGH RISK PATIENT  Once         11/03/24 2347     Place sequential compression device  Until discontinued         11/03/24 2347                       On 11/04/2024, patient should be placed in hospital observation services under my care.        Pharmacist Renal Dose Adjustment Note    Richi Soriano is a 48 y.o. male being treated with Enoxaparin.     Patient Data:    Vital Signs (Most Recent):  Temp: 98.1 °F (36.7 °C) (11/03/24 1901)  Pulse: 100 (11/03/24 1901)  Resp: 16 (11/03/24 2255)  BP: (!) 156/77 (11/03/24 1901)  SpO2: 98 % (11/03/24 1901) Vital Signs (72h Range):  Temp:  [98.1 °F (36.7 °C)]   Pulse:  [100]   Resp:  [16]   BP: (156)/(77)   SpO2:  [98 %]      Recent Labs   Lab 11/03/24 2105   CREATININE 0.9     Serum creatinine: 0.9 mg/dL 11/03/24 2105  Estimated creatinine clearance: 145.3 mL/min  Body mass index is 53.22 kg/m².    Enoxaparin 40 mg every 24 hours will be changed to Enoxaparin 60 mg every 12 hours due to CrCl > 30 mL/min and BMI > 50 per Renal Dose Adjustment protocol.     Pharmacist's Name: Cece Soriano  Pharmacist's Extension: [phone]      Beronica Mirza MD  Department of Hospital Medicine  Replaced by [facility] - Emergency Dept

## 2024-11-04 NOTE — PLAN OF CARE
Problem: Adult Inpatient Plan of Care  Goal: Plan of Care Review  Outcome: Met  Goal: Patient-Specific Goal (Individualized)  Outcome: Met  Goal: Absence of Hospital-Acquired Illness or Injury  Outcome: Met  Goal: Optimal Comfort and Wellbeing  Outcome: Met  Goal: Readiness for Transition of Care  Outcome: Met     Problem: Bariatric Environmental Safety  Goal: Safety Maintained with Care  Outcome: Met     Problem: Diabetes Comorbidity  Goal: Blood Glucose Level Within Targeted Range  Outcome: Met     Problem: Wound  Goal: Optimal Coping  Outcome: Met  Goal: Optimal Functional Ability  Outcome: Met  Goal: Absence of Infection Signs and Symptoms  Outcome: Met  Goal: Improved Oral Intake  Outcome: Met  Goal: Optimal Pain Control and Function  Outcome: Met  Goal: Skin Health and Integrity  Outcome: Met  Goal: Optimal Wound Healing  Outcome: Met

## 2024-11-04 NOTE — ED PROVIDER NOTES
"Encounter Date: 11/3/2024       History     Chief Complaint   Patient presents with    Arm Pain     Reports "right arm  pain and swelling for two days" unk etiology / no trauma / + edema to right hand region / neuro intact     48-year-old man past medical history of hypertension, type 2 diabetes, and hyperlipidemia status post angiogram 1 month ago presents to the emergency department for right arm swelling from the elbow to the fingertips that began yesterday morning.  Patient states he does not recall injuring the arm, being bit by an insect, surgery, or other trauma to the arm.  He denies fever.  States that the swelling has progressively worsened and he is concerned that it is infected.  Reports intermittent numbness and tingling in the 2nd through the 5th digit.  He was unable to grab objects or perform daily tasks due to the pressure buildup.        Review of patient's allergies indicates:  No Known Allergies  Past Medical History:   Diagnosis Date    Diabetes mellitus, type 2     Diverticulitis of colon     Hyperlipidemia     Hypertension     Sleep apnea      Past Surgical History:   Procedure Laterality Date    ABDOMINAL SURGERY      APPENDECTOMY      COLON SURGERY      HERNIA REPAIR       Family History   Family history unknown: Yes     Social History     Tobacco Use    Smoking status: Former     Current packs/day: 0.50     Average packs/day: 0.5 packs/day for 10.0 years (5.0 ttl pk-yrs)     Types: Cigarettes     Passive exposure: Past    Smokeless tobacco: Never   Substance Use Topics    Alcohol use: No     Comment: ocassionally    Drug use: No     Review of Systems   Constitutional: Negative.    Respiratory: Negative.     Cardiovascular: Negative.    Gastrointestinal: Negative.    Genitourinary: Negative.    Musculoskeletal:  Positive for myalgias.   Skin: Negative.    Neurological: Negative.        Physical Exam     Initial Vitals [11/03/24 1901]   BP Pulse Resp Temp SpO2   (!) 156/77 100 16 98.1 °F " (36.7 °C) 98 %      MAP       --         Physical Exam    Vitals reviewed.  Constitutional: He appears well-developed and well-nourished. He is not diaphoretic. No distress.   HENT:   Head: Atraumatic. Mouth/Throat: Oropharynx is clear and moist. No oropharyngeal exudate.   Eyes: Conjunctivae and EOM are normal. Pupils are equal, round, and reactive to light. Right eye exhibits no discharge. Left eye exhibits no discharge.   Neck:   Normal range of motion.  Cardiovascular:  Normal rate, regular rhythm, normal heart sounds and intact distal pulses.           Pulmonary/Chest: Breath sounds normal. No respiratory distress. He has no wheezes. He has no rhonchi. He has no rales. He exhibits no tenderness.   Abdominal: Abdomen is soft. Bowel sounds are normal. He exhibits no distension and no mass. There is no abdominal tenderness. There is no rebound and no guarding.   Musculoskeletal:         General: Edema (Noted to the right extremity from the elbow to the fingertips.  Minimal scattered areas of erythema that are flat) present.      Cervical back: Normal range of motion.     Neurological: He is alert.   Skin: Skin is warm. Capillary refill takes less than 2 seconds.         ED Course   Procedures  Labs Reviewed   CBC W/ AUTO DIFFERENTIAL - Abnormal       Result Value    WBC 8.61      RBC 4.65      Hemoglobin 14.0      Hematocrit 42.6      MCV 92      MCH 30.1      MCHC 32.9      RDW 13.1      Platelets 217      MPV 11.2      Immature Granulocytes 2.7 (*)     Gran # (ANC) 5.4      Immature Grans (Abs) 0.23 (*)     Lymph # 1.8      Mono # 0.8      Eos # 0.3      Baso # 0.09      nRBC 0      Gran % 62.9      Lymph % 20.9      Mono % 9.2      Eosinophil % 3.3      Basophil % 1.0      Differential Method Automated     COMPREHENSIVE METABOLIC PANEL - Abnormal    Sodium 135 (*)     Potassium 4.1      Chloride 99      CO2 26      Glucose 261 (*)     BUN 14      Creatinine 0.9      Calcium 9.2      Total Protein 7.4       Albumin 4.6      Total Bilirubin 0.4      Alkaline Phosphatase 96      AST 19      ALT 33      eGFR >60.0      Anion Gap 10     URINALYSIS, REFLEX TO URINE CULTURE - Abnormal    Specimen UA Urine, Clean Catch      Color, UA Colorless (*)     Appearance, UA Clear      pH, UA 7.0      Specific Gravity, UA 1.020      Protein, UA Negative      Glucose, UA 4+ (*)     Ketones, UA Negative      Bilirubin (UA) Negative      Occult Blood UA Negative      Nitrite, UA Negative      Urobilinogen, UA Negative      Leukocytes, UA Negative      Narrative:     Specimen Source->Urine   CULTURE, BLOOD   CULTURE, BLOOD   C-REACTIVE PROTEIN    CRP 0.60     SEDIMENTATION RATE    Sed Rate 10     URINALYSIS MICROSCOPIC    RBC, UA 0      WBC, UA 0      Bacteria None      Yeast, UA None      Squam Epithel, UA 2      Microscopic Comment SEE COMMENT      Narrative:     Specimen Source->Urine   CK   NIKHIL PROFILE I (SCREEN) W/ REFLEX   CK         NIKHIL PROFILE   ISTAT LACTATE    POC Lactate 1.27      Sample VENOUS     POCT LACTATE          Imaging Results              CTA Upper Extremity Right (In process)                      X-Ray Hand 3 View Right (Final result)  Result time 11/03/24 22:37:08      Final result by Sal Hall MD (11/03/24 22:37:08)                   Impression:      No radiographic evidence of acute displaced fracture of the right forearm and hand.  Mild nonspecific soft tissue edema without discrete retained radiopaque foreign body.      Electronically signed by: Sal Hall MD  Date:    11/03/2024  Time:    22:37               Narrative:    EXAMINATION:  XR FOREARM RIGHT; XR HAND COMPLETE 3 VIEW RIGHT    CLINICAL HISTORY:  swelling;Other specified soft tissue disorders    TECHNIQUE:  AP and lateral views of the right forearm were performed.  Three views of the right hand were performed.    COMPARISON:  None    FINDINGS:  There is no radiographic evidence of acute displaced fracture of the right forearm and  right hand.  Alignment appears within normal limits without evidence of dislocation.  Joint spaces appear relatively well maintained.  No aggressive cortical destruction or periosteal reaction identified.  No retained radiopaque foreign body identified in the visualized soft tissues.  There is mild nonspecific subcutaneous edema.                                       X-Ray Forearm Right (Final result)  Result time 11/03/24 22:37:08      Final result by Sal Hall MD (11/03/24 22:37:08)                   Impression:      No radiographic evidence of acute displaced fracture of the right forearm and hand.  Mild nonspecific soft tissue edema without discrete retained radiopaque foreign body.      Electronically signed by: Sal Hall MD  Date:    11/03/2024  Time:    22:37               Narrative:    EXAMINATION:  XR FOREARM RIGHT; XR HAND COMPLETE 3 VIEW RIGHT    CLINICAL HISTORY:  swelling;Other specified soft tissue disorders    TECHNIQUE:  AP and lateral views of the right forearm were performed.  Three views of the right hand were performed.    COMPARISON:  None    FINDINGS:  There is no radiographic evidence of acute displaced fracture of the right forearm and right hand.  Alignment appears within normal limits without evidence of dislocation.  Joint spaces appear relatively well maintained.  No aggressive cortical destruction or periosteal reaction identified.  No retained radiopaque foreign body identified in the visualized soft tissues.  There is mild nonspecific subcutaneous edema.                                       US Upper Extremity Veins Right (Final result)  Result time 11/03/24 20:44:47      Final result by Sal Hall MD (11/03/24 20:44:47)                   Impression:      No thrombus in central veins of the right upper extremity.      Electronically signed by: Sal Hall MD  Date:    11/03/2024  Time:    20:44               Narrative:    EXAMINATION:  US UPPER EXTREMITY  VEINS RIGHT    CLINICAL HISTORY:  DVT Rule out;    TECHNIQUE:  Duplex and color flow Doppler evaluation and dynamic compression was performed of the right upper extremity veins.    COMPARISON:  None    FINDINGS:  Central veins: The internal jugular, subclavian, and axillary veins are patent and free of thrombus.    Arm veins: The brachial, basilic, and cephalic veins are patent and compressible.    Other findings: None.                                       Medications   vancomycin - pharmacy to dose (has no administration in time range)   piperacillin-tazobactam 3.375 g in dextrose 5 % 100 mL IVPB (ready to mix) (3.375 g Intravenous New Bag 11/3/24 8807)   vancomycin in dextrose 5 % 1 gram/250 mL IVPB 1,000 mg (has no administration in time range)     And   vancomycin in dextrose 5 % 1 gram/250 mL IVPB 1,000 mg (has no administration in time range)   aspirin EC tablet 81 mg (has no administration in time range)   atorvastatin tablet 20 mg (has no administration in time range)   fenofibrate tablet 160 mg (has no administration in time range)   losartan tablet 50 mg (has no administration in time range)   sodium chloride 0.9% flush 2 mL (has no administration in time range)   melatonin tablet 9 mg (has no administration in time range)   senna-docusate 8.6-50 mg per tablet 1 tablet (has no administration in time range)   ibuprofen tablet 400 mg (has no administration in time range)   acetaminophen tablet 650 mg (has no administration in time range)   naloxone 0.4 mg/mL injection 0.02 mg (has no administration in time range)   magnesium oxide tablet 800 mg (has no administration in time range)   magnesium oxide tablet 800 mg (has no administration in time range)   potassium, sodium phosphates 280-160-250 mg packet 2 packet (has no administration in time range)   potassium, sodium phosphates 280-160-250 mg packet 2 packet (has no administration in time range)   potassium, sodium phosphates 280-160-250 mg packet 2 packet  (has no administration in time range)   glucose chewable tablet 16 g (has no administration in time range)   glucose chewable tablet 24 g (has no administration in time range)   dextrose 50% injection 12.5 g (has no administration in time range)   dextrose 50% injection 25 g (has no administration in time range)   glucagon (human recombinant) injection 1 mg (has no administration in time range)   potassium bicarbonate disintegrating tablet 50 mEq (has no administration in time range)   potassium bicarbonate disintegrating tablet 35 mEq (has no administration in time range)   potassium bicarbonate disintegrating tablet 60 mEq (has no administration in time range)   morphine injection 2 mg (has no administration in time range)   HYDROmorphone injection 0.5 mg (has no administration in time range)   ondansetron injection 4 mg (has no administration in time range)   insulin aspart U-100 pen 0-5 Units (has no administration in time range)   enoxaparin injection 60 mg (has no administration in time range)   HYDROmorphone injection 0.5 mg (0.5 mg Intravenous Given 11/3/24 5121)   iohexoL (OMNIPAQUE 350) injection 100 mL (100 mLs Intravenous Given 11/3/24 0867)     Medical Decision Making  48-year-old man past medical history of hypertension, type 2 diabetes, and hyperlipidemia status post angiogram 1 month ago presents to the emergency department for right arm swelling from the elbow to the fingertips that began yesterday morning.  Patient states he does not recall injuring the arm, being bit by an insect, surgery, or other trauma to the arm.  He denies fever.  States that the swelling has progressively worsened and he is concerned that it is infected.  Reports intermittent numbness and tingling in the 2nd through the 5th digit.  He was unable to grab objects or perform daily tasks due to the pressure buildup.    Considerations include but not limited to DVT, cellulitis, postsurgical complication, fracture, dislocation,  fasciitis, compartment syndrome    Vitals stable.  Patient afebrile.  He is well-appearing on physical exam. Patient has notable swelling in the right upper extremity from the elbow to the fingertips.  He is significantly tender over the region of swelling.  Patient is unable to  my hand or flex the fingers of the right hand.  Sensation in the ulnar, medial, and radial nerve distribution intact.  Ultrasound of upper extremity shows no thrombus in the central veins.  X-ray shows no radiographic evidence of acute displaced fracture of the right forearm and hand however mild nonspecific soft tissue edema without discrete retained radiopaque foreign body. CBC, CMP, urine unremarkable.  CRP 0.60.  Sed rate 10.  .  Point of care lactate 1.27.  We will admit the patient to hospital medicine for potential MRI or CTA.  Antibiotics started.  Patient was also assessed by my attending.  Hospital Medicine will be taking over care moving forward.    Amount and/or Complexity of Data Reviewed  Labs: ordered.  Radiology: ordered.    Risk  Prescription drug management.  Decision regarding hospitalization.                                      Clinical Impression:  Final diagnoses:  [M79.89] Swelling of arm          ED Disposition Condition    Observation                 Milka Watson PA-C  11/04/24 0014       Milka Watson PA-C  11/04/24 0016

## 2024-11-04 NOTE — DISCHARGE INSTRUCTIONS
Discharge Instructions, Critical access hospital Medicine    Thank you for choosing [facility] for your medical care. The primary doctor who is taking care of you at the time of your discharge is Jesus Conroy MD.     Please keep your RIGHT arm elevated as much as possible- above the level of the heart.    You were admitted to the hospital with Right arm pain.     Please note your discharge instructions, including diet/activity restrictions, follow-up appointments, and medication changes.  If you have any questions about your medical issues, prescriptions, or any other questions, please feel free to contact the [facility] Medicine Dept at [phone] and we will help.    If you are previously with Home health, outpatient PT/OT or under a therapy program, you are cleared to return to those programs.    Please direct all long term medication refills and follow up to your primary care provider, Nancy Hollis, GALE. Thank you again for letting us take care of your health care needs.    Please note the following discharge instructions per your discharging physician-  Ting Landry NP

## 2024-11-04 NOTE — PLAN OF CARE
Critical access hospital  Initial Discharge Assessment       Primary Care Provider: Nancy Hollis FNP-CODIE    Admission Diagnosis: Swelling of arm [M79.89]    Admission Date: 11/3/2024  Expected Discharge Date:     Transition of Care Barriers: (P) None     met with Pt at bedside to complete an initial discharge assessment. Pt AAOx4s.  Demographics, PCP, and insurance verified. Pt has No dialysis. Pt reports ability to complete ADLs without assistance. Pt verbalized plan to discharge home via family transport. Pt has no other needs to be addressed at this time.     Payor: [place] [employer] / Plan: [facility] CHOICE PLUS / Product Type: Commercial /     Extended Emergency Contact Information  Primary Emergency Contact: Roxie Soriano  Address: [address] [place] [address] [place] [address] [facility]  Home Phone: [phone]  Mobile Phone: [phone]  Relation: Spouse  Preferred language: English   needed? No    Discharge Plan A: (P) Home with family  Discharge Plan B: (P) Home with family      [id number] - [place] [id number] [facility] AT [facility] & [address] [address]  Phone: [phone] Fax: [phone]      Initial Assessment (most recent)       Adult Discharge Assessment - 11/04/24 0853          Discharge Assessment    Assessment Type Discharge Planning Assessment     Confirmed/corrected address, phone number and insurance Yes     Confirmed Demographics Correct on Facesheet     Source of Information patient     When was your last doctors appointment? --   3 months ago    Does patient/caregiver understand observation status Yes (P)      Reason For Admission Right arm pain (P)      People in Home child(pete), dependent;spouse (P)      Facility Arrived From: Home (P)      Do you expect to return to your current living situation? Yes (P)      Do you have help at home or someone to help you manage your care at home? Yes (P)       Who are your caregiver(s) and their phone number(s)? Roxie Soriano Tali (Spouse)  [phone] (Home Phone) (P)      Prior to hospitilization cognitive status: Unable to Assess (P)      Current cognitive status: Alert/Oriented (P)      Walking or Climbing Stairs Difficulty no (P)      Dressing/Bathing Difficulty no (P)      Home Accessibility wheelchair accessible (P)      Home Layout Able to live on 1st floor (P)      Equipment Currently Used at Home CPAP (P)      Readmission within 30 days? No (P)      Patient currently being followed by outpatient case management? No (P)      Do you currently have service(s) that help you manage your care at home? No (P)      Do you take prescription medications? Yes (P)      Do you have prescription coverage? Yes (P)      Coverage [facility] CHOICE PLUS - (P)      Do you have any problems affording any of your prescribed medications? No (P)      Is the patient taking medications as prescribed? yes (P)      Who is going to help you get home at discharge? Roxie Soriano Tali (Spouse)  [phone] (Home Phone) (P)      How do you get to doctors appointments? car, drives self (P)      Are you on dialysis? No (P)      Do you take coumadin? No (P)      Discharge Plan A Home with family (P)      Discharge Plan B Home with family (P)      DME Needed Upon Discharge  none (P)      Discharge Plan discussed with: Patient (P)      Transition of Care Barriers None (P)

## 2024-11-04 NOTE — HPI
48 year old male with PMH of diabetes type 2, JANKI on CPAP, hyperlipidemia, hypertension presents with pain in right arm extending to the hand for 1 day.  Symptoms started yesterday morning. Pain was located along medial forearm, extending down to the hand.  Denies numbness but has weakness due to pain (unable to make fist with right hand).  Areas along forearm feel tense compared to left arm.  Denies trauma, recent travel, insect bites, previous occurrences, fevers, new rashes.  Patient tried motrin which did not help, compressive devices made symptoms worse.  Denies family history of autoimmune diseases.    Patient has been taking atorvastatin and fenofibrate for over a year.  Patient had a coronary angiogram via right radial approach 1 month ago ([facility]  Reports angio did not reveal any significant vessel occlusions.       ESR/CRP, CPK, and WBC are normal.  Given vanco and zosyn ordered in ED.

## 2024-11-04 NOTE — ASSESSMENT & PLAN NOTE
Patients blood pressure range in the last 24 hours was: BP  Min: 156/77  Max: 156/77.The patient's inpatient anti-hypertensive regimen is listed below:  Current Antihypertensives  losartan tablet 50 mg, Daily, Oral    Plan  - BP is controlled, no changes needed to their regimen

## 2024-11-04 NOTE — NURSING
Took out IV, removed telly monitor. Gave pt. Discharge paper work and went over it with pt.   Pt. Wanted to walk down stairs with family member

## 2024-11-04 NOTE — PROGRESS NOTES
Pharmacist Renal Dose Adjustment Note    Richi Soriano is a 48 y.o. male being treated with Enoxaparin.     Patient Data:    Vital Signs (Most Recent):  Temp: 98.1 °F (36.7 °C) (11/03/24 1901)  Pulse: 100 (11/03/24 1901)  Resp: 16 (11/03/24 2255)  BP: (!) 156/77 (11/03/24 1901)  SpO2: 98 % (11/03/24 1901) Vital Signs (72h Range):  Temp:  [98.1 °F (36.7 °C)]   Pulse:  [100]   Resp:  [16]   BP: (156)/(77)   SpO2:  [98 %]      Recent Labs   Lab 11/03/24 2105   CREATININE 0.9     Serum creatinine: 0.9 mg/dL 11/03/24 2105  Estimated creatinine clearance: 145.3 mL/min  Body mass index is 53.22 kg/m².    Enoxaparin 40 mg every 24 hours will be changed to Enoxaparin 60 mg every 12 hours due to CrCl > 30 mL/min and BMI > 50 per Renal Dose Adjustment protocol.     Pharmacist's Name: Cece Soriano  Pharmacist's Extension: [phone]

## 2024-11-04 NOTE — PLAN OF CARE
Pt clear for DC from case management standpoint. Discharging to home.    Added previously scheduled PCP and Ortho Appointments to AVS.       11/04/24 1235   Final Note   Assessment Type Final Discharge Note   Anticipated Discharge Disposition Home

## 2024-11-04 NOTE — SUBJECTIVE & OBJECTIVE
Past Medical History:   Diagnosis Date    Diabetes mellitus, type 2     Diverticulitis of colon     Hyperlipidemia     Hypertension     Sleep apnea        Past Surgical History:   Procedure Laterality Date    ABDOMINAL SURGERY      APPENDECTOMY      COLON SURGERY      HERNIA REPAIR         Review of patient's allergies indicates:  No Known Allergies    No current facility-administered medications on file prior to encounter.     Current Outpatient Medications on File Prior to Encounter   Medication Sig    aspirin (ECOTRIN) 81 MG EC tablet Take 1 tablet (81 mg total) by mouth once daily.    atorvastatin (LIPITOR) 20 MG tablet Take 1 tablet (20 mg total) by mouth once daily.    cholecalciferol, vitamin D3, 1,250 mcg (50,000 unit) capsule Take 1 capsule by mouth every 7 days. MONDAYS    fenofibrate 160 MG Tab TAKE 1 TABLET(160 MG) BY MOUTH EVERY DAY    glipiZIDE (GLUCOTROL) 10 MG TR24 TAKE 1 TABLET(10 MG) BY MOUTH DAILY WITH BREAKFAST    JARDIANCE 25 mg tablet TAKE 1 TABLET(25 MG) BY MOUTH EVERY DAY    losartan (COZAAR) 50 MG tablet TAKE 1 TABLET(50 MG) BY MOUTH EVERY DAY    metFORMIN (GLUCOPHAGE-XR) 750 MG ER 24hr tablet TAKE 2 TABLETS(1500 MG) BY MOUTH DAILY WITH BREAKFAST    tirzepatide (MOUNJARO) 7.5 mg/0.5 mL PnIj ADMINISTER 7.5 MG UNDER THE SKIN EVERY 7 DAYS (Patient taking differently: Inject 7.5 mg into the skin once a week.)     Family History    Family history is unknown by patient.       Tobacco Use    Smoking status: Former     Current packs/day: 0.50     Average packs/day: 0.5 packs/day for 10.0 years (5.0 ttl pk-yrs)     Types: Cigarettes     Passive exposure: Past    Smokeless tobacco: Never   Substance and Sexual Activity    Alcohol use: No     Comment: ocassionally    Drug use: No    Sexual activity: Yes     Partners: Female     Review of Systems   Constitutional:  Negative for chills, fatigue and fever.   HENT:  Negative for ear pain, sinus pain and sore throat.    Eyes:  Negative for visual  disturbance.   Respiratory:  Negative for cough, chest tightness, shortness of breath and wheezing.    Cardiovascular:  Negative for chest pain, palpitations and leg swelling.   Gastrointestinal:  Negative for abdominal distention, constipation, nausea and vomiting.   Endocrine: Negative for polyuria.   Genitourinary:  Negative for dysuria and hematuria.   Musculoskeletal:  Positive for myalgias.   Skin:  Negative for pallor and rash.   Neurological:  Positive for weakness. Negative for dizziness, numbness and headaches.   Psychiatric/Behavioral:  Negative for agitation.      Objective:     Vital Signs (Most Recent):  Temp: 98.1 °F (36.7 °C) (11/03/24 1901)  Pulse: 100 (11/03/24 1901)  Resp: 16 (11/03/24 2255)  BP: (!) 156/77 (11/03/24 1901)  SpO2: 98 % (11/03/24 1901) Vital Signs (24h Range):  Temp:  [98.1 °F (36.7 °C)] 98.1 °F (36.7 °C)  Pulse:  [100] 100  Resp:  [16] 16  SpO2:  [98 %] 98 %  BP: (156)/(77) 156/77     Weight: (!) 158.8 kg (350 lb)  Body mass index is 53.22 kg/m².     Physical Exam  Constitutional:       General: He is not in acute distress.     Appearance: Normal appearance. He is obese. He is not toxic-appearing.   HENT:      Head: Normocephalic and atraumatic.      Nose: No rhinorrhea.      Mouth/Throat:      Mouth: Mucous membranes are moist.      Pharynx: No oropharyngeal exudate.   Eyes:      General: No scleral icterus.     Extraocular Movements: Extraocular movements intact.      Pupils: Pupils are equal, round, and reactive to light.   Cardiovascular:      Rate and Rhythm: Normal rate and regular rhythm.      Pulses: Normal pulses.      Heart sounds: Normal heart sounds. No murmur heard.  Pulmonary:      Effort: Pulmonary effort is normal. No respiratory distress.      Breath sounds: Normal breath sounds. No wheezing or rales.   Abdominal:      General: There is no distension.      Palpations: Abdomen is soft.      Tenderness: There is no abdominal tenderness.   Musculoskeletal:          General: Swelling and tenderness present.      Comments: Patient has tenderness to palpation along right medial forearm and right hand, +muscle tense along same area   Skin:     Coloration: Skin is not jaundiced.      Findings: No bruising, erythema or rash.   Neurological:      General: No focal deficit present.      Mental Status: He is alert and oriented to person, place, and time.   Psychiatric:         Mood and Affect: Mood normal.         Behavior: Behavior normal.              CRANIAL NERVES     CN III, IV, VI   Pupils are equal, round, and reactive to light.       Significant Labs: All pertinent labs within the past 24 hours have been reviewed.  BMP:   Recent Labs   Lab 11/03/24 2105   *   *   K 4.1   CL 99   CO2 26   BUN 14   CREATININE 0.9   CALCIUM 9.2     CBC:   Recent Labs   Lab 11/03/24 2105   WBC 8.61   HGB 14.0   HCT 42.6          Significant Imaging: I have reviewed all pertinent imaging results/findings within the past 24 hours.

## 2024-11-04 NOTE — ASSESSMENT & PLAN NOTE
Patient presented with 1 day history of right arm pain, swelling from the elbow to hand. On exam right radial pulse is weaker than left.  Patient had recent coronary angiogram with right radial approach about 1 month ago.  Suspect radial artery occlusion (SAMS).      CT angio right upper extremity  MRI right upper extremity - could discontinue if CTA is positive  Consider anticoagulation vs endovascular intervention if SAMS   Pain control PRN  Continue aspirin

## 2024-11-05 ENCOUNTER — OFFICE VISIT (OUTPATIENT)
Dept: ORTHOPEDICS | Facility: CLINIC | Age: 49
End: 2024-11-05
Payer: COMMERCIAL

## 2024-11-05 ENCOUNTER — PATIENT OUTREACH (OUTPATIENT)
Dept: FAMILY MEDICINE | Facility: CLINIC | Age: 49
End: 2024-11-05
Payer: COMMERCIAL

## 2024-11-05 ENCOUNTER — HOSPITAL ENCOUNTER (OUTPATIENT)
Dept: RADIOLOGY | Facility: HOSPITAL | Age: 49
Discharge: HOME | End: 2024-11-05
Attending: ORTHOPAEDIC SURGERY
Payer: COMMERCIAL

## 2024-11-05 VITALS — WEIGHT: 315 LBS | HEIGHT: 68 IN | BODY MASS INDEX: 47.74 KG/M2

## 2024-11-05 DIAGNOSIS — G56.02 CARPAL TUNNEL SYNDROME ON LEFT: Primary | ICD-10-CM

## 2024-11-05 DIAGNOSIS — G56.01 CARPAL TUNNEL SYNDROME ON RIGHT: ICD-10-CM

## 2024-11-05 LAB — ANA SER-ACNC: NEGATIVE

## 2024-11-05 PROCEDURE — 99999 PR PBB SHADOW E&M-EST. PATIENT-LVL IV: CPT | Mod: PBBFAC,,, | Performed by: ORTHOPAEDIC SURGERY

## 2024-11-05 PROCEDURE — 1160F RVW MEDS BY RX/DR IN RCRD: CPT | Mod: CPTII,S$GLB,, | Performed by: ORTHOPAEDIC SURGERY

## 2024-11-05 PROCEDURE — 4010F ACE/ARB THERAPY RXD/TAKEN: CPT | Mod: CPTII,S$GLB,, | Performed by: ORTHOPAEDIC SURGERY

## 2024-11-05 PROCEDURE — 3008F BODY MASS INDEX DOCD: CPT | Mod: CPTII,S$GLB,, | Performed by: ORTHOPAEDIC SURGERY

## 2024-11-05 PROCEDURE — 73130 X-RAY EXAM OF HAND: CPT | Mod: 26,LT,, | Performed by: RADIOLOGY

## 2024-11-05 PROCEDURE — 1159F MED LIST DOCD IN RCRD: CPT | Mod: CPTII,S$GLB,, | Performed by: ORTHOPAEDIC SURGERY

## 2024-11-05 PROCEDURE — 73130 X-RAY EXAM OF HAND: CPT | Mod: TC,PN,LT

## 2024-11-05 PROCEDURE — 99203 OFFICE O/P NEW LOW 30 MIN: CPT | Mod: S$GLB,,, | Performed by: ORTHOPAEDIC SURGERY

## 2024-11-05 NOTE — PROGRESS NOTES
[facility]    Subjective:     Chief Complaint:   Chief Complaint   Patient presents with    Right Hand - Pain     Bilateral hands, right worse than left, numbness and tingling, weakness with the right, have XR of right hand in chart, need left hand XR    Left Hand - Pain       Past Medical History:   Diagnosis Date    Diabetes mellitus, type 2     Diverticulitis of colon     Hyperlipidemia     Hypertension     Sleep apnea        Past Surgical History:   Procedure Laterality Date    ABDOMINAL SURGERY      APPENDECTOMY      COLON SURGERY      HERNIA REPAIR         Current Outpatient Medications   Medication Sig    aspirin (ECOTRIN) 81 MG EC tablet Take 1 tablet (81 mg total) by mouth once daily.    atorvastatin (LIPITOR) 20 MG tablet Take 1 tablet (20 mg total) by mouth once daily.    fenofibrate 160 MG Tab TAKE 1 TABLET(160 MG) BY MOUTH EVERY DAY (Patient taking differently: Take 160 mg by mouth once daily.)    glipiZIDE (GLUCOTROL) 10 MG TR24 TAKE 1 TABLET(10 MG) BY MOUTH DAILY WITH BREAKFAST (Patient taking differently: Take 10 mg by mouth daily with breakfast. TAKE 1 TABLET(10 MG) BY MOUTH DAILY WITH BREAKFAST)    HYDROcodone-acetaminophen (NORCO) 5-325 mg per tablet Take 1 tablet by mouth every 6 (six) hours as needed for Pain.    JARDIANCE 25 mg tablet TAKE 1 TABLET(25 MG) BY MOUTH EVERY DAY (Patient taking differently: Take 25 mg by mouth once daily.)    losartan (COZAAR) 50 MG tablet TAKE 1 TABLET(50 MG) BY MOUTH EVERY DAY (Patient taking differently: Take 50 mg by mouth once daily.)    metFORMIN (GLUCOPHAGE-XR) 750 MG ER 24hr tablet TAKE 2 TABLETS(1500 MG) BY MOUTH DAILY WITH BREAKFAST (Patient taking differently: Take 2 tablets by mouth daily with breakfast. TAKE 2 TABLETS(1500 MG) BY MOUTH DAILY WITH BREAKFAST)    methylPREDNISolone (MEDROL DOSEPACK) 4 mg tablet use as directed    tirzepatide (MOUNJARO) 7.5 mg/0.5 mL PnIj ADMINISTER 7.5 MG UNDER THE SKIN EVERY 7 DAYS     No current  facility-administered medications for this visit.       Review of patient's allergies indicates:  No Known Allergies    Family History   Family history unknown: Yes       Social History     Socioeconomic History    Marital status:    Tobacco Use    Smoking status: Former     Current packs/day: 0.50     Average packs/day: 0.5 packs/day for 10.0 years (5.0 ttl pk-yrs)     Types: Cigarettes     Passive exposure: Past    Smokeless tobacco: Never   Substance and Sexual Activity    Alcohol use: No     Comment: ocassionally    Drug use: No    Sexual activity: Yes     Partners: Female     Social Drivers of Health     Financial Resource Strain: Patient Declined (11/4/2024)    Overall Financial Resource Strain (CARDIA)     Difficulty of Paying Living Expenses: Patient declined   Food Insecurity: Patient Declined (11/4/2024)    Hunger Vital Sign     Worried About Running Out of Food in the Last Year: Patient declined     Ran Out of Food in the Last Year: Patient declined   Transportation Needs: Patient Declined (11/4/2024)    TRANSPORTATION NEEDS     Transportation : Patient declined   Physical Activity: Inactive (8/5/2024)    Exercise Vital Sign     Days of Exercise per Week: 0 days     Minutes of Exercise per Session: 0 min   Stress: Patient Declined (11/4/2024)    [employer] - Occupational Stress Questionnaire     Feeling of Stress : Patient declined   Housing Stability: Patient Declined (11/4/2024)    Housing Stability Vital Sign     Unable to Pay for Housing in the Last Year: Patient declined     Homeless in the Last Year: Patient declined       History of present illness:  Kehinde comes in today as a new patient with a chief complaint of left hand numbness and tingling that has been going on for over a year.  He also complains of some right hand swelling and stiffness that has been going on week.  Does not really report any numbness or tingling in the right hand but does have some  electrical shock goes into his digits when tapping carpal tunnel.    Review of Systems:    Constitution: Negative for chills, fever, and sweats.  Negative for unexplained weight loss.    HENT:  Negative for headaches and blurry vision.    Cardiovascular:Negative for chest pain or irregular heart beat. Negative for hypertension.    Respiratory:  Negative for cough and shortness of breath.    Gastrointestinal: Negative for abdominal pain, heartburn, melena, nausea, and vomitting.    Genitourinary:  Negative bladder incontinence and dysuria.    Musculoskeletal:  See HPI for details.     Neurological: Negative for numbness.    Psychiatric/Behavioral: Negative for depression.  The patient is not nervous/anxious.      Endocrine: Negative for polyuria    Hematologic/Lymphatic: Negative for bleeding problem.  Does not bruise/bleed easily.    Skin: Negative for poor would healing and rash    Objective:      Physical Examination:    Vital Signs:  There were no vitals filed for this visit.    Body mass index is 53.22 kg/m².    This a well-developed, well nourished patient in no acute distress.  They are alert and oriented and cooperative to examination.        Bilateral hand exam: Skin to bilateral hands clean dry and intact.  No erythema or ecchymosis bilaterally.  No signs or symptoms of infection bilaterally.  He is neurovascularly intact throughout bilateral upper extremities.  He does have a positive Tinel sign bilateral carpal tunnels.  He has reproduction of paresthesias into the median nerve distribution of bilateral hands with prolonged wrist flexion consistent with a positive Phalen sign bilaterally.  He does have some stiffness and difficulty with closing of the right hand about the IP and MCP joints.      Pertinent New Results:    XRAY Report / Interpretation:   See attached report     Assessment/Plan:      1. Bilateral carpal tunnel syndrome.      Plan is to proceed with a bilateral upper extremity EMG/NCS to  "evaluate for any carpal/cubital tunnel syndrome bilaterally.  We will also get him into OT to work on range of motion of the right hand and digits to reduce any stiffness there.  We will see him back with the results of the study and make further treatment recommendations from there.    Bob Blanton, Physician Assistant, served in the capacity as a "scribe" for this patient encounter.  A "face-to-face" encounter occurred with Dr. Mando Craig on this date.  The treatment plan and medical decision-making is outlined above. Patient was seen and examined with a chaperone.       This note was created using Dragon voice recognition software that occasionally misinterpreted phrases or words.          "

## 2024-11-05 NOTE — TELEPHONE ENCOUNTER
Called pt regard HFU/patient outreach. No answer and not able to LVM. Will try and contact tomorrow. BEE

## 2024-11-06 ENCOUNTER — PATIENT OUTREACH (OUTPATIENT)
Dept: FAMILY MEDICINE | Facility: CLINIC | Age: 49
End: 2024-11-06
Payer: COMMERCIAL

## 2024-11-06 NOTE — TELEPHONE ENCOUNTER
Discharge Information     Discharge Date: 11/04/2024       Primary Discharge Diagnosis:  Right arm pain      Discharge Summary:  Reviewed      Medication & Order Review     Were medication changes made or new medications added?   Yes    If so, has the patient filled the prescriptions?  Yes     Was Home Health ordered? No    If so, has Home Health contacted patient and/or initiated services?  N/A    Name of Home Health Agency? N/A    Durable Medical Equipment ordered?  No     If so, has the DME provider contacted patient and delivered equipment?  N/A    Follow Up               Any problems since discharge? No    How is the patient feeling since returning home?  Pt states he still experiencing little right arm pain but bearable.     Have you set up recommended follow up appointments?  (cardiology, surgery, etc.) Completed appt with Ortho/DR. Craig on 11/05/2024 with scheduled F/U on 12/05/2024. KM    Schedule Hospital Follow-up appointment within 7-14 days (preferably 7).  HFU scheduled with PCP Liza Nancy Hollis on 11/07/2024 at 9:00. KM    Notes:  49 yo male adm on 11/03/2024 and d/c on 11/04/2024 due to right arm pain. Pt states he tolerate meds well and completed appt with ortho on 11/05/2024. Pt states he is scheduled for PT with recheck ortho appt on 12/05/2024. Pt states he experiencing little arm pain but bearable. Pt instructed to continue meds as prescribed and proceed to ED if he develop and abnormal signs or symptoms discussed during intake and recheck with PCP ac scheduled. Pt aware of information given. BEE [place]

## 2024-11-07 ENCOUNTER — OFFICE VISIT (OUTPATIENT)
Dept: FAMILY MEDICINE | Facility: CLINIC | Age: 49
End: 2024-11-07
Payer: COMMERCIAL

## 2024-11-07 VITALS
TEMPERATURE: 98 F | WEIGHT: 315 LBS | RESPIRATION RATE: 20 BRPM | SYSTOLIC BLOOD PRESSURE: 142 MMHG | HEIGHT: 68 IN | BODY MASS INDEX: 47.74 KG/M2 | DIASTOLIC BLOOD PRESSURE: 78 MMHG | HEART RATE: 96 BPM

## 2024-11-07 DIAGNOSIS — E11.9 TYPE 2 DIABETES MELLITUS WITHOUT COMPLICATION, WITHOUT LONG-TERM CURRENT USE OF INSULIN: ICD-10-CM

## 2024-11-07 DIAGNOSIS — Z09 HOSPITAL DISCHARGE FOLLOW-UP: Primary | ICD-10-CM

## 2024-11-07 DIAGNOSIS — M79.601 RIGHT ARM PAIN: ICD-10-CM

## 2024-11-07 DIAGNOSIS — E78.1 HYPERTRIGLYCERIDEMIA WITHOUT HYPERCHOLESTEROLEMIA: ICD-10-CM

## 2024-11-07 PROCEDURE — 99999 PR PBB SHADOW E&M-EST. PATIENT-LVL IV: CPT | Mod: PBBFAC,,, | Performed by: NURSE PRACTITIONER

## 2024-11-07 RX ORDER — OXYCODONE AND ACETAMINOPHEN 10; 325 MG/1; MG/1
1 TABLET ORAL EVERY 6 HOURS PRN
Qty: 28 TABLET | Refills: 0 | Status: SHIPPED | OUTPATIENT
Start: 2024-11-07

## 2024-11-07 RX ORDER — TIRZEPATIDE 7.5 MG/.5ML
7.5 INJECTION, SOLUTION SUBCUTANEOUS WEEKLY
Qty: 2 ML | Refills: 2 | Status: SHIPPED | OUTPATIENT
Start: 2024-11-07

## 2024-11-07 NOTE — PROGRESS NOTES
SUBJECTIVE:    Patient ID: Richi Soriano is a 48 y.o. male.    Chief Complaint: Hospital Follow Up (47 yo male here for HFU. Pt was adm on 11/03/2024 and d/c on 11/04/2024 due to right arm pain. Pt states he was eval/tx with Dr. Craig and started PT. Pt states still experiencing right arm pain off/on. KM)    History of Present Illness    CHIEF COMPLAINT:  Mr. Soriano presents for follow-up of right arm pain and swelling that resulted in a recent hospital admission, as well as for a routine three-month follow-up visit.    HPI:  Mr. Soriano reports sudden onset of right arm pain and swelling, specifically in the elbow and hand. The hand swelling was severe enough to impair fist formation. The pain is constant and rated 8/10 in severity. Mr. Soriano describes a sensation of something stuck in the elbow, feeling like it needs to pop but will not. Pain and discomfort extend from the elbow to the fingers. Mr. Soriano also reports an electrical shock sensation, 10 times more intense than the tingling associated with his known carpal tunnel syndrome in the other hand.    Mr. Soriano was admitted to the hospital overnight due to these symptoms. Multiple tests were conducted, including x-ray, ultrasound, MRI, and CT, but no clear cause was identified. Mr. Soriano was discharged with pain medication and a steroid pack, of which 2 days remain.    Following discharge, the patient saw an orthopedist, Dr. Craig, about 2 days ago. Dr. Craig suspects a pinched or compressed ulnar nerve and has scheduled nerve tests. Surgery might be necessary if the diagnosis is confirmed.    The swelling has decreased slightly since the hospital visit, but the patient still has limited hand function. He is currently unable to work, as his job involves computer use all day. Mr. Soriano is scheduled to return to work on Tuesday and has physical therapy scheduled for Monday.    Mr. Soriano had an angiography since his last visit in August,  which showed normal results with no blockages. He has an upcoming colonoscopy scheduled for next Monday, which coincides with his birthday.    Mr. Soriano denies any precipitating incident at work or elsewhere that could have caused the arm pain and swelling. Mr. Soriano denies having any allergies to codeine or similar medications.    MEDICATIONS:  Mr. Soriano is on pain medication, which is ineffective. He is currently on a steroid pack with 2 days left for arm swelling. Mr. Soriano is also on Metformin and an appetite suppressant.    MEDICAL HISTORY:  Mr. Soriano has a history of carpal tunnel syndrome in his right hand and diabetes.    SURGICAL HISTORY:  Mr. Soriano underwent an angiography on an unspecified date, which yielded normal results with no blockage.    TEST RESULTS:  During a hospital visit, the patient's blood sugar was found to be elevated. Other labs conducted during the same visit was normal. A nerve test has been scheduled for the patient but has not yet been completed.    IMAGING:  During a hospital visit, the patient underwent several imaging tests including an X-ray, ultrasound, MRI, and CT. The results of all these tests were inconclusive. Since August, the patient had an angiography which showed normal results with no blockage.    SOCIAL HISTORY:  Mr. Soriano reports occasional use of alcohol for pain relief. He works on a computer all day.      ROS:  General: -fever, -chills, -fatigue, -weight gain, -weight loss  Eyes: -vision changes, -redness, -discharge  ENT: -ear pain, -nasal congestion, -sore throat  Cardiovascular: -chest pain, -palpitations, -lower extremity edema  Respiratory: -cough, -shortness of breath  Gastrointestinal: -abdominal pain, -nausea, -vomiting, -diarrhea, -constipation, -blood in stool  Genitourinary: -dysuria, -hematuria, -frequency  Musculoskeletal: -joint pain, -muscle pain, +limb swelling  Skin: -rash, -lesion  Neurological: -headache, -dizziness, -numbness,  -tingling  Psychiatric: -anxiety, -depression, -sleep difficulty         Admit Date: 11/3/2024  Discharge Date: 11/4/2024  Discharge Facility: Hospital      Family and/or Caretaker present at visit? No  Medication Reconciliation:  Medications changed/added/deleted. Pain medicine and steroid adrian   New Prescriptions filled after discharge: yes . Patient states pain medication is not helping pain   Discharge summary reviewed:  yes  Diagnostic tests reviewed/disposition: I have reviewed all completed as well as pending diagnostic tests at the time of discharge  Disease/illness education: Done   Follow up appointments scheduled:  not applicable              with Orthopedics  Follow up labs/tests ordered:   not applicable  Home Health ordered on discharge: Patient does not have home health established from hospital visit.  They do not need home health.  If needed, we will set up home health for the patient  Home Health company name: N/A  Establishment or re-establishment of referral orders for community resources: No other necessary community resources  DME ordered at discharge:   no  How patient is feeling since discharge from the hospital?  He state he is feeling better but his arm is still hurting and pain medication has not been helpful at all.      Discussion with other health care providers: No discussion with other health care providers necessary  Patient follow up phone call documented on separate encounter.    Admission on 11/03/2024, Discharged on 11/04/2024   Component Date Value Ref Range Status    WBC 11/03/2024 8.61  3.90 - 12.70 K/uL Final    RBC 11/03/2024 4.65  4.60 - 6.20 M/uL Final    Hemoglobin 11/03/2024 14.0  14.0 - 18.0 g/dL Final    Hematocrit 11/03/2024 42.6  40.0 - 54.0 % Final    MCV 11/03/2024 92  82 - 98 fL Final    MCH 11/03/2024 30.1  27.0 - 31.0 pg Final    MCHC 11/03/2024 32.9  32.0 - 36.0 g/dL Final    RDW 11/03/2024 13.1  11.5 - 14.5 % Final    Platelets 11/03/2024 217  150 - 450 K/uL  Final    MPV 11/03/2024 11.2  9.2 - 12.9 fL Final    Immature Granulocytes 11/03/2024 2.7 (H)  0.0 - 0.5 % Final    Gran # (ANC) 11/03/2024 5.4  1.8 - 7.7 K/uL Final    Immature Grans (Abs) 11/03/2024 0.23 (H)  0.00 - 0.04 K/uL Final    Lymph # 11/03/2024 1.8  1.0 - 4.8 K/uL Final    Mono # 11/03/2024 0.8  0.3 - 1.0 K/uL Final    Eos # 11/03/2024 0.3  0.0 - 0.5 K/uL Final    Baso # 11/03/2024 0.09  0.00 - 0.20 K/uL Final    nRBC 11/03/2024 0  0 /100 WBC Final    Gran % 11/03/2024 62.9  38.0 - 73.0 % Final    Lymph % 11/03/2024 20.9  18.0 - 48.0 % Final    Mono % 11/03/2024 9.2  4.0 - 15.0 % Final    Eosinophil % 11/03/2024 3.3  0.0 - 8.0 % Final    Basophil % 11/03/2024 1.0  0.0 - 1.9 % Final    Differential Method 11/03/2024 Automated   Final    Sodium 11/03/2024 135 (L)  136 - 145 mmol/L Final    Potassium 11/03/2024 4.1  3.5 - 5.1 mmol/L Final    Chloride 11/03/2024 99  95 - 110 mmol/L Final    CO2 11/03/2024 26  23 - 29 mmol/L Final    Glucose 11/03/2024 261 (H)  70 - 110 mg/dL Final    BUN 11/03/2024 14  6 - 20 mg/dL Final    Creatinine 11/03/2024 0.9  0.5 - 1.4 mg/dL Final    Calcium 11/03/2024 9.2  8.7 - 10.5 mg/dL Final    Total Protein 11/03/2024 7.4  6.0 - 8.4 g/dL Final    Albumin 11/03/2024 4.6  3.5 - 5.2 g/dL Final    Total Bilirubin 11/03/2024 0.4  0.1 - 1.0 mg/dL Final    Alkaline Phosphatase 11/03/2024 96  55 - 135 U/L Final    AST 11/03/2024 19  10 - 40 U/L Final    ALT 11/03/2024 33  10 - 44 U/L Final    eGFR 11/03/2024 >60.0  >60 mL/min/1.73 m^2 Final    Anion Gap 11/03/2024 10  8 - 16 mmol/L Final    Specimen UA 11/03/2024 Urine, Clean Catch   Final    Color, UA 11/03/2024 Colorless (A)  Yellow, Straw, Fannie Final    Appearance, UA 11/03/2024 Clear  Clear Final    pH, UA 11/03/2024 7.0  5.0 - 8.0 Final    Specific Gravity, UA 11/03/2024 1.020  1.005 - 1.030 Final    Protein, UA 11/03/2024 Negative  Negative Final    Glucose, UA 11/03/2024 4+ (A)  Negative Final    Ketones, UA 11/03/2024 Negative   Negative Final    Bilirubin (UA) 11/03/2024 Negative  Negative Final    Occult Blood UA 11/03/2024 Negative  Negative Final    Nitrite, UA 11/03/2024 Negative  Negative Final    Urobilinogen, UA 11/03/2024 Negative  Negative EU/dL Final    Leukocytes, UA 11/03/2024 Negative  Negative Final    CRP 11/03/2024 0.60  <1.00 mg/dL Final    Sed Rate 11/03/2024 10  0 - 10 mm/Hr Final    Blood Culture, Routine 11/03/2024 No Growth to date   Preliminary    Blood Culture, Routine 11/03/2024 No Growth to date   Preliminary    Blood Culture, Routine 11/03/2024 No Growth to date   Preliminary    Blood Culture, Routine 11/03/2024 No Growth to date   Preliminary    Blood Culture, Routine 11/03/2024 No Growth to date   Preliminary    Blood Culture, Routine 11/03/2024 No Growth to date   Preliminary    Blood Culture, Routine 11/03/2024 No Growth to date   Preliminary    Blood Culture, Routine 11/03/2024 No Growth to date   Preliminary    QRS Duration 11/03/2024 84  ms In process    [facility] QTC Calculation 11/03/2024 437  ms In process    POC Lactate 11/03/2024 1.27  0.5 - 2.2 mmol/L Final    Sample 11/03/2024 VENOUS   Final    RBC, UA 11/03/2024 0  0 - 4 /hpf Final    WBC, UA 11/03/2024 0  0 - 5 /hpf Final    Bacteria 11/03/2024 None  None-Occ /hpf Final    Yeast, UA 11/03/2024 None  None Final    Squam Epithel, UA 11/03/2024 2  /hpf Final    Microscopic Comment 11/03/2024 SEE COMMENT   Final    NIKHIL 11/04/2024 Negative   Final    CPK 11/03/2024 187  20 - 200 U/L Final    Sodium 11/04/2024 131 (L)  136 - 145 mmol/L Final    Potassium 11/04/2024 3.9  3.5 - 5.1 mmol/L Final    Chloride 11/04/2024 99  95 - 110 mmol/L Final    CO2 11/04/2024 24  23 - 29 mmol/L Final    Glucose 11/04/2024 260 (H)  70 - 110 mg/dL Final    BUN 11/04/2024 12  6 - 20 mg/dL Final    Creatinine 11/04/2024 0.8  0.5 - 1.4 mg/dL Final    Calcium 11/04/2024 8.7  8.7 - 10.5 mg/dL Final    Anion Gap 11/04/2024 8  8 - 16 mmol/L Final    eGFR 11/04/2024 >60.0  >60  mL/min/1.73 m^2 Final    Magnesium 11/04/2024 2.0  1.6 - 2.6 mg/dL Final    Phosphorus 11/04/2024 3.0  2.7 - 4.5 mg/dL Final    WBC 11/04/2024 8.18  3.90 - 12.70 K/uL Final    RBC 11/04/2024 4.50 (L)  4.60 - 6.20 M/uL Final    Hemoglobin 11/04/2024 13.7 (L)  14.0 - 18.0 g/dL Final    Hematocrit 11/04/2024 40.9  40.0 - 54.0 % Final    MCV 11/04/2024 91  82 - 98 fL Final    MCH 11/04/2024 30.4  27.0 - 31.0 pg Final    MCHC 11/04/2024 33.5  32.0 - 36.0 g/dL Final    RDW 11/04/2024 13.2  11.5 - 14.5 % Final    Platelets 11/04/2024 201  150 - 450 K/uL Final    MPV 11/04/2024 11.0  9.2 - 12.9 fL Final    Immature Granulocytes 11/04/2024 2.8 (H)  0.0 - 0.5 % Final    Gran # (ANC) 11/04/2024 4.9  1.8 - 7.7 K/uL Final    Immature Grans (Abs) 11/04/2024 0.23 (H)  0.00 - 0.04 K/uL Final    Lymph # 11/04/2024 1.8  1.0 - 4.8 K/uL Final    Mono # 11/04/2024 0.8  0.3 - 1.0 K/uL Final    Eos # 11/04/2024 0.4  0.0 - 0.5 K/uL Final    Baso # 11/04/2024 0.08  0.00 - 0.20 K/uL Final    nRBC 11/04/2024 0  0 /100 WBC Final    Gran % 11/04/2024 59.8  38.0 - 73.0 % Final    Lymph % 11/04/2024 21.6  18.0 - 48.0 % Final    Mono % 11/04/2024 9.9  4.0 - 15.0 % Final    Eosinophil % 11/04/2024 4.9  0.0 - 8.0 % Final    Basophil % 11/04/2024 1.0  0.0 - 1.9 % Final    Differential Method 11/04/2024 Automated   Final    POCT Glucose 11/04/2024 237 (H)  70 - 110 mg/dL Final    POCT Glucose 11/04/2024 210 (H)  70 - 110 mg/dL Final   Admission on 07/30/2024, Discharged on 07/31/2024   Component Date Value Ref Range Status    QRS Duration 07/30/2024 96  ms Final    [facility] QTC Calculation 07/30/2024 437  ms Final    WBC 07/30/2024 8.08  3.90 - 12.70 K/uL Final    RBC 07/30/2024 4.65  4.60 - 6.20 M/uL Final    Hemoglobin 07/30/2024 13.9 (L)  14.0 - 18.0 g/dL Final    Hematocrit 07/30/2024 42.4  40.0 - 54.0 % Final    MCV 07/30/2024 91  82 - 98 fL Final    MCH 07/30/2024 29.9  27.0 - 31.0 pg Final    MCHC 07/30/2024 32.8  32.0 - 36.0 g/dL Final    RDW  07/30/2024 13.1  11.5 - 14.5 % Final    Platelets 07/30/2024 180  150 - 450 K/uL Final    MPV 07/30/2024 11.0  9.2 - 12.9 fL Final    Immature Granulocytes 07/30/2024 CANCELED  0.0 - 0.5 % Final    Immature Grans (Abs) 07/30/2024 CANCELED  0.00 - 0.04 K/uL Final    nRBC 07/30/2024 0  0 /100 WBC Final    Gran % 07/30/2024 56.0  38.0 - 73.0 % Final    Lymph % 07/30/2024 25.0  18.0 - 48.0 % Final    Mono % 07/30/2024 11.0  4.0 - 15.0 % Final    Eosinophil % 07/30/2024 3.0  0.0 - 8.0 % Final    Basophil % 07/30/2024 0.0  0.0 - 1.9 % Final    Bands 07/30/2024 3.0  % Final    Metamyelocytes 07/30/2024 2.0  % Final    Platelet Estimate 07/30/2024 Appears normal   Final    Differential Method 07/30/2024 Manual   Final    Sodium 07/30/2024 136  136 - 145 mmol/L Final    Potassium 07/30/2024 4.2  3.5 - 5.1 mmol/L Final    Chloride 07/30/2024 102  95 - 110 mmol/L Final    CO2 07/30/2024 22 (L)  23 - 29 mmol/L Final    Glucose 07/30/2024 221 (H)  70 - 110 mg/dL Final    BUN 07/30/2024 16  6 - 20 mg/dL Final    Creatinine 07/30/2024 0.8  0.5 - 1.4 mg/dL Final    Calcium 07/30/2024 9.2  8.7 - 10.5 mg/dL Final    Total Protein 07/30/2024 6.9  6.0 - 8.4 g/dL Final    Albumin 07/30/2024 4.3  3.5 - 5.2 g/dL Final    Total Bilirubin 07/30/2024 0.7  0.1 - 1.0 mg/dL Final    Alkaline Phosphatase 07/30/2024 71  55 - 135 U/L Final    AST 07/30/2024 22  10 - 40 U/L Final    ALT 07/30/2024 41  10 - 44 U/L Final    eGFR 07/30/2024 >60.0  >60 mL/min/1.73 m^2 Final    Anion Gap 07/30/2024 12  8 - 16 mmol/L Final    BNP 07/30/2024 13  0 - 99 pg/mL Final    Magnesium 07/30/2024 1.8  1.6 - 2.6 mg/dL Final    Troponin I High Sensitivity 07/30/2024 3.9  0.0 - 14.9 pg/mL Final    Prothrombin Time 07/30/2024 10.5  9.0 - 12.5 sec Final    INR 07/30/2024 0.9  0.8 - 1.2 Final    SARS-CoV-2 RNA, Amplification, Qual 07/30/2024 Positive (AA)  Negative Final    D-Dimer 07/30/2024 0.19  0.00 - 0.49 mg/L FEU Final    Troponin I High Sensitivity 07/30/2024 2.7   0.0 - 14.9 pg/mL Final    POC Glucose 07/30/2024 101  70 - 110 Final    Sodium 07/31/2024 134 (L)  136 - 145 mmol/L Final    Potassium 07/31/2024 4.5  3.5 - 5.1 mmol/L Final    Chloride 07/31/2024 104  95 - 110 mmol/L Final    CO2 07/31/2024 16 (L)  23 - 29 mmol/L Final    Glucose 07/31/2024 155 (H)  70 - 110 mg/dL Final    BUN 07/31/2024 17  6 - 20 mg/dL Final    Creatinine 07/31/2024 0.7  0.5 - 1.4 mg/dL Final    Calcium 07/31/2024 8.7  8.7 - 10.5 mg/dL Final    Total Protein 07/31/2024 6.7  6.0 - 8.4 g/dL Final    Albumin 07/31/2024 4.1  3.5 - 5.2 g/dL Final    Total Bilirubin 07/31/2024 0.5  0.1 - 1.0 mg/dL Final    Alkaline Phosphatase 07/31/2024 71  55 - 135 U/L Final    AST 07/31/2024 27  10 - 40 U/L Final    ALT 07/31/2024 40  10 - 44 U/L Final    eGFR 07/31/2024 >60.0  >60 mL/min/1.73 m^2 Final    Anion Gap 07/31/2024 14  8 - 16 mmol/L Final    Magnesium 07/31/2024 2.2  1.6 - 2.6 mg/dL Final    WBC 07/31/2024 8.47  3.90 - 12.70 K/uL Final    RBC 07/31/2024 4.73  4.60 - 6.20 M/uL Final    Hemoglobin 07/31/2024 14.6  14.0 - 18.0 g/dL Final    Hematocrit 07/31/2024 43.3  40.0 - 54.0 % Final    MCV 07/31/2024 92  82 - 98 fL Final    MCH 07/31/2024 30.9  27.0 - 31.0 pg Final    MCHC 07/31/2024 33.7  32.0 - 36.0 g/dL Final    RDW 07/31/2024 13.1  11.5 - 14.5 % Final    Platelets 07/31/2024 195  150 - 450 K/uL Final    MPV 07/31/2024 10.6  9.2 - 12.9 fL Final    Immature Granulocytes 07/31/2024 4.7 (H)  0.0 - 0.5 % Final    Gran # (ANC) 07/31/2024 5.5  1.8 - 7.7 K/uL Final    Immature Grans (Abs) 07/31/2024 0.40 (H)  0.00 - 0.04 K/uL Final    Lymph # 07/31/2024 1.5  1.0 - 4.8 K/uL Final    Mono # 07/31/2024 0.7  0.3 - 1.0 K/uL Final    Eos # 07/31/2024 0.3  0.0 - 0.5 K/uL Final    Baso # 07/31/2024 0.13  0.00 - 0.20 K/uL Final    nRBC 07/31/2024 0  0 /100 WBC Final    Gran % 07/31/2024 65.0  38.0 - 73.0 % Final    Lymph % 07/31/2024 17.5 (L)  18.0 - 48.0 % Final    Mono % 07/31/2024 7.9  4.0 - 15.0 % Final     Eosinophil % 07/31/2024 3.4  0.0 - 8.0 % Final    Basophil % 07/31/2024 1.5  0.0 - 1.9 % Final    Differential Method 07/31/2024 Automated   Final   Office Visit on 07/24/2024   Component Date Value Ref Range Status    SARS Coronavirus 2 Antigen 07/24/2024 Positive (A)  Negative Final     Acceptable 07/24/2024 Yes   Final    Rapid Influenza A Ag 07/24/2024 Negative  Negative Final    Rapid Influenza B Ag 07/24/2024 Negative  Negative Final     Acceptable 07/24/2024 Yes   Final    Rapid Strep A Screen 07/24/2024 Negative  Negative Final     Acceptable 07/24/2024 Yes   Final       Past Medical History:   Diagnosis Date    Diabetes mellitus, type 2     Diverticulitis of colon     Hyperlipidemia     Hypertension     Sleep apnea      Past Surgical History:   Procedure Laterality Date    ABDOMINAL SURGERY      APPENDECTOMY      COLON SURGERY      HERNIA REPAIR       Family History   Family history unknown: Yes       Marital Status:   Alcohol History:  reports no history of alcohol use.  Tobacco History:  reports that he has quit smoking. His smoking use included cigarettes. He has a 5 pack-year smoking history. He has been exposed to tobacco smoke. He has never used smokeless tobacco.  Drug History:  reports no history of drug use.    Review of patient's allergies indicates:  No Known Allergies    Current Outpatient Medications:     aspirin (ECOTRIN) 81 MG EC tablet, Take 1 tablet (81 mg total) by mouth once daily., Disp: 30 tablet, Rfl: 0    atorvastatin (LIPITOR) 20 MG tablet, Take 1 tablet (20 mg total) by mouth once daily., Disp: 30 tablet, Rfl: 0    fenofibrate 160 MG Tab, TAKE 1 TABLET(160 MG) BY MOUTH EVERY DAY, Disp: 90 tablet, Rfl: 1    glipiZIDE (GLUCOTROL) 10 MG TR24, TAKE 1 TABLET(10 MG) BY MOUTH DAILY WITH BREAKFAST, Disp: 90 tablet, Rfl: 1    HYDROcodone-acetaminophen (NORCO) 5-325 mg per tablet, Take 1 tablet by mouth every 6 (six) hours as needed for  "Pain., Disp: 14 tablet, Rfl: 0    JARDIANCE 25 mg tablet, TAKE 1 TABLET(25 MG) BY MOUTH EVERY DAY, Disp: 90 tablet, Rfl: 1    losartan (COZAAR) 50 MG tablet, TAKE 1 TABLET(50 MG) BY MOUTH EVERY DAY, Disp: 90 tablet, Rfl: 1    metFORMIN (GLUCOPHAGE-XR) 750 MG ER 24hr tablet, TAKE 2 TABLETS(1500 MG) BY MOUTH DAILY WITH BREAKFAST, Disp: 180 tablet, Rfl: 1    methylPREDNISolone (MEDROL DOSEPACK) 4 mg tablet, use as directed, Disp: 21 each, Rfl: 0    oxyCODONE-acetaminophen (PERCOCET)  mg per tablet, Take 1 tablet by mouth every 6 (six) hours as needed for Pain., Disp: 28 tablet, Rfl: 0    tirzepatide (MOUNJARO) 7.5 mg/0.5 mL PnIj, Inject 7.5 mg into the skin once a week., Disp: 2 mL, Rfl: 2  Objective:      Vitals:    11/07/24 0859 11/07/24 0931   BP: (!) 170/60 (!) 142/78   Pulse: 96    Resp: 20    Temp: 97.8 °F (36.6 °C)    TempSrc: Oral    Weight: (!) 159 kg (350 lb 8 oz)    Height: 5' 8" (1.727 m)      Physical Exam    General: No acute distress. Well-developed. Well-nourished.  Eyes: EOMI. Sclerae anicteric.  HENT: Normocephalic. Atraumatic. Nares patent. Moist oral mucosa.  Ears: Bilateral TMs clear. Bilateral EACs clear.  Cardiovascular: Regular rate. Regular rhythm. No murmurs. No rubs. No gallops. Normal S1, S2.  Respiratory: Normal respiratory effort. Clear to auscultation bilaterally. No rales. No rhonchi. No wheezing.  Abdomen: Soft. Non-tender. Non-distended. Normoactive bowel sounds.  Musculoskeletal: No  obvious deformity.  Extremities: No lower extremity edema.  Neurological: Alert & oriented x3. No slurred speech. Normal gait.  Psychiatric: Normal mood. Normal affect. Good insight. Good judgment.  Skin: Warm. Dry. No rash.       Assessment:       Assessment & Plan    Evaluated right arm pain and swelling, considering normal imaging results  Changed pain medication from [place] to Percocet for better pain management  Recognized elevated blood sugar likely due to steroid treatment  Will follow up with " Dr. Craig regarding arm pain diagnosis and treatment plan    STEROID TREATMENT:  - Explained that current lab results may be affected by steroid treatment.  - Discussed potential impact of steroid pack on blood sugar.  - Continued steroid pack (patient has 2 days left to complete).    DIABETES:  - Mr. Soriano to monitor blood sugar at home more frequently while on steroid treatment.  - Attempting to refill Ozempic at [facility] pharmacy due to cost issues at [facility].    PAIN MANAGEMENT:  - Started Percocet for pain management.  - Discontinued Norco.    PHYSICAL THERAPY:  - Mr. Soriano to continue physical therapy as scheduled.    LABS:  - Blood work ordered to be done at [facility] (not urgent, to be completed when patient is able).    ULNAR NERVE COMPRESSION:  - Previously referred to orthopedic specialist Dr. Craig for potential ulnar nerve compression.  - Follow up with Dr. Craig for nerve test results and potential surgical intervention.    FOLLOW UP:  - Contact the office if [facility] pharmacy cost for Ozempic is still elevated to explore alternatives or cost-saving options.       Plan:       Hospital discharge follow-up  -     oxyCODONE-acetaminophen (PERCOCET)  mg per tablet; Take 1 tablet by mouth every 6 (six) hours as needed for Pain.  Dispense: 28 tablet; Refill: 0    Type 2 diabetes mellitus without complication, without long-term current use of insulin  -     Cancel: HEMOGLOBIN A1C; Future; Expected date: 11/07/2024  -     Cancel: COMPREHENSIVE METABOLIC PANEL; Future; Expected date: 11/07/2024  -     tirzepatide (MOUNJARO) 7.5 mg/0.5 mL PnIj; Inject 7.5 mg into the skin once a week.  Dispense: 2 mL; Refill: 2  -     HEMOGLOBIN A1C; Future; Expected date: 11/07/2024  -     COMPREHENSIVE METABOLIC PANEL; Future; Expected date: 11/07/2024    Hypertriglyceridemia without hypercholesterolemia  -     Cancel: LIPID PANEL; Future; Expected date: 11/07/2024  -     LIPID PANEL; Future; Expected date:  11/07/2024    Right arm pain  -     oxyCODONE-acetaminophen (PERCOCET)  mg per tablet; Take 1 tablet by mouth every 6 (six) hours as needed for Pain.  Dispense: 28 tablet; Refill: 0      Follow up in about 3 months (around 2/7/2025).    This note was generated with the assistance of ambient listening technology. Verbal consent was obtained by the patient and accompanying visitor(s) for the recording of patient appointment to facilitate this note. I attest to having reviewed and edited the generated note for accuracy, though some syntax or spelling errors may persist. Please contact the author of this note for any clarification.        Answers submitted by the patient for this visit:  Diabetes Questionnaire (Submitted on 11/6/2024)  Chief Complaint: Diabetes problem  Diabetes type: type 2  MedicAlert ID: [id number]  Disease duration: 15 Years  blurred vision: No  chest pain: No  fatigue: No  foot paresthesias: No  foot ulcerations: No  polydipsia: Yes  polyphagia: Yes  polyuria: Yes  visual change: No  weakness: No  weight loss: No  Symptom course: stable  confusion: No  speech difficulty: No  dizziness: No  nervous/anxious: No  headaches: No  hunger: Yes  mood changes: No  pallor: No  seizures: No  tremors: No  sleepiness: No  sweats: No  blackouts: No  hospitalization: No  nocturnal hypoglycemia: No  required assistance: No  required glucagon: No  CVA: No  heart disease: No  impotence: No  nephropathy: No  peripheral neuropathy: No  PVD: No  retinopathy: No  autonomic neuropathy: No  CAD risks: dyslipidemia, hypertension, obesity, diabetes mellitus, male sex  Current treatments: diet, oral agent (triple therapy)  Treatment compliance: all of the time  Dose schedule: pre-breakfast  Given by: patient  Injection sites: abdominal wall  Home blood tests: 1-2 x per week  Home urines: <1 x per month  Monitoring compliance: adequate  Blood glucose trend: fluctuating minimally  Weight trend: stable  Current diet: generally  healthy  Meal planning: none  Exercise: rarely  Dietitian visit: No  Eye exam current: Yes  Sees podiatrist: Yes

## 2024-11-08 ENCOUNTER — PATIENT MESSAGE (OUTPATIENT)
Dept: INTERNAL MEDICINE | Facility: CLINIC | Age: 49
End: 2024-11-08
Payer: COMMERCIAL

## 2024-11-08 LAB
BACTERIA BLD CULT: NORMAL
BACTERIA BLD CULT: NORMAL

## 2024-11-13 ENCOUNTER — TELEPHONE (OUTPATIENT)
Dept: GASTROENTEROLOGY | Facility: CLINIC | Age: 49
End: 2024-11-13
Payer: COMMERCIAL

## 2024-11-13 NOTE — TELEPHONE ENCOUNTER
----- Message from Mignon sent at 11/13/2024 10:25 AM CST -----  Contact: Richi   Richi would like a call back with questions about a colonoscopy he is going to have tomorrow

## 2024-11-13 NOTE — TELEPHONE ENCOUNTER
Patient wanted to confirm tomorrow procedure he thought he was on 11/18 instructed he is on for tomorrow.

## 2024-11-14 ENCOUNTER — HOSPITAL ENCOUNTER (OUTPATIENT)
Facility: HOSPITAL | Age: 49
Discharge: HOME | End: 2024-11-14
Attending: INTERNAL MEDICINE | Admitting: INTERNAL MEDICINE
Payer: COMMERCIAL

## 2024-11-14 ENCOUNTER — ANESTHESIA (OUTPATIENT)
Dept: ENDOSCOPY | Facility: HOSPITAL | Age: 49
End: 2024-11-14
Payer: COMMERCIAL

## 2024-11-14 ENCOUNTER — ANESTHESIA EVENT (OUTPATIENT)
Dept: ENDOSCOPY | Facility: HOSPITAL | Age: 49
End: 2024-11-14
Payer: COMMERCIAL

## 2024-11-14 DIAGNOSIS — K63.5 POLYP OF COLON, UNSPECIFIED PART OF COLON, UNSPECIFIED TYPE: ICD-10-CM

## 2024-11-14 DIAGNOSIS — K64.8 INTERNAL HEMORRHOIDS: Primary | ICD-10-CM

## 2024-11-14 DIAGNOSIS — Z86.0100 HISTORY OF COLON POLYPS: ICD-10-CM

## 2024-11-14 DIAGNOSIS — K57.90 DIVERTICULOSIS: ICD-10-CM

## 2024-11-14 PROCEDURE — 45385 COLONOSCOPY W/LESION REMOVAL: CPT | Mod: 22,33,, | Performed by: INTERNAL MEDICINE

## 2024-11-14 PROCEDURE — 45390 COLONOSCOPY W/RESECTION: CPT | Mod: 22,33,59, | Performed by: INTERNAL MEDICINE

## 2024-11-14 PROCEDURE — 63600175 PHARM REV CODE 636 W HCPCS: Performed by: STUDENT IN AN ORGANIZED HEALTH CARE EDUCATION/TRAINING PROGRAM

## 2024-11-14 PROCEDURE — 45390 COLONOSCOPY W/RESECTION: CPT | Mod: 33,59 | Performed by: INTERNAL MEDICINE

## 2024-11-14 PROCEDURE — 27201028 HC NEEDLE, SCLERO: Performed by: INTERNAL MEDICINE

## 2024-11-14 PROCEDURE — 45385 COLONOSCOPY W/LESION REMOVAL: CPT | Mod: 33 | Performed by: INTERNAL MEDICINE

## 2024-11-14 PROCEDURE — 37000008 HC ANESTHESIA 1ST 15 MINUTES: Performed by: INTERNAL MEDICINE

## 2024-11-14 PROCEDURE — 45381 COLONOSCOPY SUBMUCOUS NJX: CPT | Mod: 33 | Performed by: INTERNAL MEDICINE

## 2024-11-14 PROCEDURE — 27200997: Performed by: INTERNAL MEDICINE

## 2024-11-14 PROCEDURE — 45380 COLONOSCOPY AND BIOPSY: CPT | Mod: 22,33,59, | Performed by: INTERNAL MEDICINE

## 2024-11-14 PROCEDURE — 27201012 HC FORCEPS, HOT/COLD, DISP: Performed by: INTERNAL MEDICINE

## 2024-11-14 PROCEDURE — 25000003 PHARM REV CODE 250: Performed by: INTERNAL MEDICINE

## 2024-11-14 PROCEDURE — 27201089 HC SNARE, DISP (ANY): Performed by: INTERNAL MEDICINE

## 2024-11-14 PROCEDURE — 45380 COLONOSCOPY AND BIOPSY: CPT | Mod: 33,59 | Performed by: INTERNAL MEDICINE

## 2024-11-14 PROCEDURE — 45381 COLONOSCOPY SUBMUCOUS NJX: CPT | Mod: 22,33,51, | Performed by: INTERNAL MEDICINE

## 2024-11-14 PROCEDURE — 37000009 HC ANESTHESIA EA ADD 15 MINS: Performed by: INTERNAL MEDICINE

## 2024-11-14 RX ORDER — PROPOFOL 10 MG/ML
VIAL (ML) INTRAVENOUS
Status: DISCONTINUED | OUTPATIENT
Start: 2024-11-14 | End: 2024-11-14

## 2024-11-14 RX ORDER — DEXTROMETHORPHAN/PSEUDOEPHED 2.5-7.5/.8
DROPS ORAL
Status: COMPLETED | OUTPATIENT
Start: 2024-11-14 | End: 2024-11-14

## 2024-11-14 RX ORDER — LIDOCAINE HYDROCHLORIDE 20 MG/ML
INJECTION INTRAVENOUS
Status: DISCONTINUED | OUTPATIENT
Start: 2024-11-14 | End: 2024-11-14

## 2024-11-14 RX ADMIN — PROPOFOL 50 MG: 10 INJECTION, EMULSION INTRAVENOUS at 10:11

## 2024-11-14 RX ADMIN — GLYCOPYRROLATE 0.2 MG: 0.2 INJECTION, SOLUTION INTRAMUSCULAR; INTRAVITREAL at 10:11

## 2024-11-14 RX ADMIN — PROPOFOL 50 MG: 10 INJECTION, EMULSION INTRAVENOUS at 09:11

## 2024-11-14 RX ADMIN — PROPOFOL 20 MG: 10 INJECTION, EMULSION INTRAVENOUS at 10:11

## 2024-11-14 RX ADMIN — LIDOCAINE HYDROCHLORIDE 80 MG: 20 INJECTION, SOLUTION INTRAVENOUS at 09:11

## 2024-11-14 NOTE — ANESTHESIA PREPROCEDURE EVALUATION
11/14/2024  Richi Soriano is a 48 y.o., male.      Pre-op Assessment    I have reviewed the Patient Summary Reports.     I have reviewed the Nursing Notes. I have reviewed the NPO Status.   I have reviewed the Medications.     Review of Systems  Anesthesia Hx:             Denies Family Hx of Anesthesia complications.    Denies Personal Hx of Anesthesia complications.                    Cardiovascular:     Hypertension                                          Pulmonary:        Sleep Apnea, CPAP           Education provided regarding risk of obstructive sleep apnea            Hepatic/GI:      Liver Disease, Hepatitis  Taking GLP-1 Agonists Instructed to Hold for 7 Days No Reported GI Symptoms          Endocrine:  Diabetes, type 2         Morbid Obesity / BMI > 40      Physical Exam  General: Cooperative, Alert and Oriented    Airway:  Mallampati: IV   Neck ROM: Extension Decreased  Neck: Girth Increased        Anesthesia Plan  Type of Anesthesia, risks & benefits discussed:    Anesthesia Type: Gen Natural Airway  Intra-op Monitoring Plan: Standard ASA Monitors  Induction:  IV  Informed Consent: Informed consent signed with the Patient and all parties understand the risks and agree with anesthesia plan.  All questions answered.   ASA Score: 4    Ready For Surgery From Anesthesia Perspective.     .

## 2024-11-14 NOTE — ANESTHESIA POSTPROCEDURE EVALUATION
Anesthesia Post Evaluation    Patient: Richi Soriano    Procedure(s) Performed: Procedure(s) (LRB):  COLONOSCOPY, SCREENING, HIGH RISK PATIENT (N/A)    Final Anesthesia Type: general      Patient location during evaluation: PACU  Patient participation: Yes- Able to Participate  Level of consciousness: awake and alert  Post-procedure vital signs: reviewed and stable  Pain management: adequate  Airway patency: patent    PONV status at discharge: No PONV  Anesthetic complications: no      Cardiovascular status: blood pressure returned to baseline and hypertensive  Respiratory status: unassisted  Hydration status: euvolemic  Follow-up not needed.              Vitals Value Taken Time   /92 11/14/24 1051   Temp 36.5 °C (97.7 °F) 11/14/24 1046   Pulse 88 11/14/24 1051   Resp 12 11/14/24 1051   SpO2 95 % 11/14/24 1051         Event Time   Out of Recovery 11:06:17         Pain/Nghia Score: Nghia Score: 10 (11/14/2024 10:54 AM)

## 2024-11-14 NOTE — H&P
CC: History of colon polyps    48 year old male with above. States that symptoms are absent, no alleviating/exacerbating factors. No family history of colorectal CA. Positive personal history of polyps. No bleeding or weight loss.     ROS:  No headache, no fever/chills, no chest pain/SOB, no nausea/vomiting/diarrhea/constipation/GI bleeding/abdominal pain, no dysuria/hematuria.    VSSAF   Exam:   Alert and oriented x 3; no apparent distress   PERRLA, sclera anicteric  CV: Regular rate/rhythm, normal PMI   Lungs: Clear bilaterally with no wheeze/rales   Abdomen: Soft, NT/ND, normal bowel sounds   Ext: No cyanosis, clubbing     Impression:   As above    Plan:   Proceed with endoscopy. Further recs to follow.

## 2024-11-14 NOTE — PLAN OF CARE
Vss, jamel po fluids, denies pain, ambulates easily. IV removed, catheter intact. Discharge instructions provided and states understanding. States ready to go home.  Discharged from facility with family per wheelchair.

## 2024-11-14 NOTE — TRANSFER OF CARE
"Anesthesia Transfer of Care Note    Patient: Richi Soriano    Procedure(s) Performed: Procedure(s) (LRB):  COLONOSCOPY, SCREENING, HIGH RISK PATIENT (N/A)    Patient location: GI    Anesthesia Type: general    Transport from OR: Transported from OR on room air with adequate spontaneous ventilation    Post pain: adequate analgesia    Post assessment: no apparent anesthetic complications    Post vital signs: stable    Level of consciousness: lethargic and responds to stimulation    Nausea/Vomiting: no nausea/vomiting    Complications: none    Transfer of care protocol was followed      Last vitals: Visit Vitals  BP (!) 183/71   Pulse 92   Temp 36.6 °C (97.9 °F)   Resp 20   Ht 5' 8" (1.727 m)   Wt (!) 154.2 kg (340 lb)   SpO2 97%   BMI 51.70 kg/m²     "

## 2024-11-14 NOTE — PROVATION PATIENT INSTRUCTIONS
Discharge Summary/Instructions after an Endoscopic Procedure  Patient Name: Richi Soriano  Patient MRN: [id number]  Patient YOB: 1975 Thursday, November 14, 2024  Colt Cook MD  Dear patient,  As a result of recent federal legislation (The Federal Cures Act), you may   receive lab or pathology results from your procedure in your [facility]   account before your physician is able to contact you. Your physician or   their representative will relay the results to you with their   recommendations at their soonest availability.  Thank you,  RESTRICTIONS:  During your procedure today, you received medications for sedation.  These   medications may affect your judgment, balance and coordination.  Therefore,   for 24 hours, you have the following restrictions:   - DO NOT drive a car, operate machinery, make legal/financial decisions,   sign important papers or drink alcohol.    ACTIVITY:  Today: no heavy lifting, straining or running due to procedural   sedation/anesthesia.  The following day: return to full activity including work.  DIET:  Eat and drink normally unless instructed otherwise.     TREATMENT FOR COMMON SIDE EFFECTS:  - Mild abdominal pain, nausea, belching, bloating or excessive gas:  rest,   eat lightly and use a heating pad.  - Sore Throat: treat with throat lozenges and/or gargle with warm salt   water.  - Because air was used during the procedure, expelling large amounts of air   from your rectum or belching is normal.  - If a bowel prep was taken, you may not have a bowel movement for 1-3 days.    This is normal.  SYMPTOMS TO WATCH FOR AND REPORT TO YOUR PHYSICIAN:  1. Abdominal pain or bloating, other than gas cramps.  2. Chest pain.  3. Back pain.  4. Signs of infection such as: chills or fever occurring within 24 hours   after the procedure.  5. Rectal bleeding, which would show as bright red, maroon, or black stools.   (A tablespoon of blood from the rectum is not serious, especially  if   hemorrhoids are present.)  6. Vomiting.  7. Weakness or dizziness.  GO DIRECTLY TO THE NEAREST EMERGENCY ROOM IF YOU HAVE ANY OF THE FOLLOWING:      Difficulty breathing              Chills and/or fever over 101 F   Persistent vomiting and/or vomiting blood   Severe abdominal pain   Severe chest pain   Black, tarry stools   Bleeding- more than one tablespoon   Any other symptom or condition that you feel may need urgent attention  Your doctor recommends these additional instructions:  If any biopsies were taken, your doctors clinic will contact you in 1 to 2   weeks with any results.  - Patient has a contact number available for emergencies.  The signs and   symptoms of potential delayed complications were discussed with the   patient.  Return to normal activities tomorrow.  Written discharge   instructions were provided to the patient.   - High fiber diet.   - Continue present medications.   - Await pathology results.   - Repeat colonoscopy in 3 years for surveillance.   - Discharge patient to home (ambulatory).   - Return to GI office after studies are complete.  For questions, problems or results please call your physician - Colt Cook MD at Work:  [phone].  [facility], EMERGENCY ROOM PHONE NUMBER: [phone]  IF A COMPLICATION OR EMERGENCY SITUATION ARISES AND YOU ARE UNABLE TO REACH   YOUR PHYSICIAN - GO DIRECTLY TO THE EMERGENCY ROOM.  Colt Cook MD  11/14/2024 10:34:49 AM  This report has been verified and signed electronically.  Dear patient,  As a result of recent federal legislation (The Federal Cures Act), you may   receive lab or pathology results from your procedure in your [facility]   account before your physician is able to contact you. Your physician or   their representative will relay the results to you with their   recommendations at their soonest availability.  Thank you,  PROVATION

## 2024-11-15 VITALS
HEIGHT: 68 IN | HEART RATE: 88 BPM | BODY MASS INDEX: 47.74 KG/M2 | DIASTOLIC BLOOD PRESSURE: 92 MMHG | RESPIRATION RATE: 12 BRPM | SYSTOLIC BLOOD PRESSURE: 144 MMHG | OXYGEN SATURATION: 95 % | TEMPERATURE: 98 F | WEIGHT: 315 LBS

## 2024-11-27 ENCOUNTER — PATIENT MESSAGE (OUTPATIENT)
Dept: INTERNAL MEDICINE | Facility: CLINIC | Age: 49
End: 2024-11-27
Payer: COMMERCIAL

## 2024-12-09 ENCOUNTER — TELEPHONE (OUTPATIENT)
Dept: NEUROLOGY | Facility: CLINIC | Age: 49
End: 2024-12-09
Payer: COMMERCIAL

## 2025-01-02 ENCOUNTER — PATIENT MESSAGE (OUTPATIENT)
Dept: ADMINISTRATIVE | Facility: HOSPITAL | Age: 50
End: 2025-01-02
Payer: COMMERCIAL

## 2025-01-15 DIAGNOSIS — E11.9 TYPE 2 DIABETES MELLITUS WITHOUT COMPLICATION: ICD-10-CM

## 2025-02-17 ENCOUNTER — PATIENT MESSAGE (OUTPATIENT)
Dept: ADMINISTRATIVE | Facility: HOSPITAL | Age: 50
End: 2025-02-17
Payer: COMMERCIAL

## 2025-02-19 ENCOUNTER — TELEPHONE (OUTPATIENT)
Dept: FAMILY MEDICINE | Facility: CLINIC | Age: 50
End: 2025-02-19
Payer: COMMERCIAL

## 2025-03-10 ENCOUNTER — PATIENT MESSAGE (OUTPATIENT)
Dept: ADMINISTRATIVE | Facility: HOSPITAL | Age: 50
End: 2025-03-10
Payer: COMMERCIAL

## 2025-04-03 ENCOUNTER — PATIENT MESSAGE (OUTPATIENT)
Dept: ADMINISTRATIVE | Facility: HOSPITAL | Age: 50
End: 2025-04-03
Payer: COMMERCIAL

## 2025-05-23 ENCOUNTER — PATIENT MESSAGE (OUTPATIENT)
Dept: ADMINISTRATIVE | Facility: HOSPITAL | Age: 50
End: 2025-05-23
Payer: COMMERCIAL

## 2025-05-29 ENCOUNTER — PATIENT MESSAGE (OUTPATIENT)
Dept: ADMINISTRATIVE | Facility: HOSPITAL | Age: 50
End: 2025-05-29
Payer: COMMERCIAL

## 2025-06-20 DIAGNOSIS — E78.1 HYPERTRIGLYCERIDEMIA WITHOUT HYPERCHOLESTEROLEMIA: ICD-10-CM

## 2025-06-20 DIAGNOSIS — I10 HYPERTENSION, UNSPECIFIED TYPE: ICD-10-CM

## 2025-06-20 DIAGNOSIS — E11.9 TYPE 2 DIABETES MELLITUS WITHOUT COMPLICATION, WITHOUT LONG-TERM CURRENT USE OF INSULIN: ICD-10-CM

## 2025-06-20 RX ORDER — FENOFIBRATE 160 MG/1
160 TABLET ORAL DAILY
Qty: 90 TABLET | Refills: 1 | Status: SHIPPED | OUTPATIENT
Start: 2025-06-20

## 2025-06-20 RX ORDER — LOSARTAN POTASSIUM 50 MG/1
50 TABLET ORAL DAILY
Qty: 90 TABLET | Refills: 1 | Status: SHIPPED | OUTPATIENT
Start: 2025-06-20

## 2025-06-20 RX ORDER — GLIPIZIDE 10 MG/1
TABLET, FILM COATED, EXTENDED RELEASE ORAL
Qty: 90 TABLET | Refills: 1 | Status: SHIPPED | OUTPATIENT
Start: 2025-06-20

## 2025-07-08 ENCOUNTER — OFFICE VISIT (OUTPATIENT)
Dept: FAMILY MEDICINE | Facility: CLINIC | Age: 50
End: 2025-07-08
Payer: COMMERCIAL

## 2025-07-08 VITALS
HEART RATE: 100 BPM | WEIGHT: 315 LBS | RESPIRATION RATE: 20 BRPM | HEIGHT: 68 IN | DIASTOLIC BLOOD PRESSURE: 70 MMHG | SYSTOLIC BLOOD PRESSURE: 136 MMHG | BODY MASS INDEX: 47.74 KG/M2 | TEMPERATURE: 98 F

## 2025-07-08 DIAGNOSIS — E11.9 TYPE 2 DIABETES MELLITUS WITHOUT COMPLICATION, WITHOUT LONG-TERM CURRENT USE OF INSULIN: ICD-10-CM

## 2025-07-08 DIAGNOSIS — R74.8 ELEVATED LIVER ENZYMES: Primary | ICD-10-CM

## 2025-07-08 DIAGNOSIS — E78.1 HYPERTRIGLYCERIDEMIA WITHOUT HYPERCHOLESTEROLEMIA: ICD-10-CM

## 2025-07-08 PROCEDURE — 3008F BODY MASS INDEX DOCD: CPT | Mod: CPTII,S$GLB,, | Performed by: NURSE PRACTITIONER

## 2025-07-08 PROCEDURE — 4010F ACE/ARB THERAPY RXD/TAKEN: CPT | Mod: CPTII,S$GLB,, | Performed by: NURSE PRACTITIONER

## 2025-07-08 PROCEDURE — 99214 OFFICE O/P EST MOD 30 MIN: CPT | Mod: S$GLB,,, | Performed by: NURSE PRACTITIONER

## 2025-07-08 PROCEDURE — 99999 PR PBB SHADOW E&M-EST. PATIENT-LVL III: CPT | Mod: PBBFAC,,, | Performed by: NURSE PRACTITIONER

## 2025-07-08 PROCEDURE — 3046F HEMOGLOBIN A1C LEVEL >9.0%: CPT | Mod: CPTII,S$GLB,, | Performed by: NURSE PRACTITIONER

## 2025-07-08 PROCEDURE — 1159F MED LIST DOCD IN RCRD: CPT | Mod: CPTII,S$GLB,, | Performed by: NURSE PRACTITIONER

## 2025-07-08 PROCEDURE — 3075F SYST BP GE 130 - 139MM HG: CPT | Mod: CPTII,S$GLB,, | Performed by: NURSE PRACTITIONER

## 2025-07-08 PROCEDURE — 3078F DIAST BP <80 MM HG: CPT | Mod: CPTII,S$GLB,, | Performed by: NURSE PRACTITIONER

## 2025-07-08 RX ORDER — FENOFIBRATE 160 MG/1
160 TABLET ORAL DAILY
Qty: 90 TABLET | Refills: 1 | Status: SHIPPED | OUTPATIENT
Start: 2025-07-08

## 2025-07-08 RX ORDER — METFORMIN HYDROCHLORIDE 750 MG/1
TABLET, EXTENDED RELEASE ORAL
Qty: 180 TABLET | Refills: 1 | Status: SHIPPED | OUTPATIENT
Start: 2025-07-08

## 2025-07-08 RX ORDER — GLIPIZIDE 10 MG/1
TABLET, FILM COATED, EXTENDED RELEASE ORAL
Qty: 90 TABLET | Refills: 1 | Status: SHIPPED | OUTPATIENT
Start: 2025-07-08

## 2025-07-08 RX ORDER — ROSUVASTATIN CALCIUM 20 MG/1
20 TABLET, COATED ORAL DAILY
Qty: 90 TABLET | Refills: 3 | Status: SHIPPED | OUTPATIENT
Start: 2025-07-08 | End: 2026-07-08

## 2025-07-08 NOTE — PROGRESS NOTES
" Patient ID: Richi Soriano is a 49 y.o. male.    Chief Complaint: Follow-up (48 yo male here for 3 month recheck. KM)    History of Present Illness                  Past Medical History:   Diagnosis Date    Diabetes mellitus, type 2     Diverticulitis of colon     Hyperlipidemia     Hypertension     Sleep apnea     uses CPAP     Past Surgical History:   Procedure Laterality Date    ABDOMINAL SURGERY      APPENDECTOMY      COLON SURGERY      colon resection    COLONOSCOPY, SCREENING, HIGH RISK PATIENT N/A 11/14/2024    Procedure: COLONOSCOPY, SCREENING, HIGH RISK PATIENT;  Surgeon: Colt Phillips MD;  Location: [facility];  Service: Endoscopy;  Laterality: N/A;    HERNIA REPAIR      ventral         Tobacco History:  reports that he has quit smoking. His smoking use included cigarettes. He has a 5 pack-year smoking history. He has been exposed to tobacco smoke. He has never used smokeless tobacco.      Review of patient's allergies indicates:  No Known Allergies  Current Medications[1]           Objective:      Vitals:    07/08/25 1055   BP: 136/70   Pulse: 100   Resp: 20   Temp: 97.8 °F (36.6 °C)   TempSrc: Oral   Weight: (!) 158.1 kg (348 lb 8.8 oz)   Height: 5' 8" (1.727 m)     Physical Exam      Assessment:       No diagnosis found.       Plan:       Assessment & Plan              There are no diagnoses linked to this encounter.  No follow-ups on file.        7/8/2025 Nancy Hollis NP    This note was generated with the assistance of ambient listening technology. Verbal consent was obtained by the patient and accompanying visitor(s) for the recording of patient appointment to facilitate this note. I attest to having reviewed and edited the generated note for accuracy, though some syntax or spelling errors may persist. Please contact the author of this note for any clarification.             [1]   Current Outpatient Medications:     aspirin (ECOTRIN) 81 MG EC tablet, Take 1 tablet (81 mg total) by mouth " once daily., Disp: 30 tablet, Rfl: 0    atorvastatin (LIPITOR) 20 MG tablet, Take 1 tablet (20 mg total) by mouth once daily., Disp: 30 tablet, Rfl: 0    fenofibrate 160 MG Tab, Take 1 tablet (160 mg total) by mouth once daily., Disp: 90 tablet, Rfl: 1    glipiZIDE (GLUCOTROL) 10 MG TR24, TAKE 1 TABLET(10 MG) BY MOUTH DAILY WITH BREAKFAST, Disp: 90 tablet, Rfl: 1    HYDROcodone-acetaminophen (NORCO) 5-325 mg per tablet, Take 1 tablet by mouth every 6 (six) hours as needed for Pain., Disp: 14 tablet, Rfl: 0    JARDIANCE 25 mg tablet, TAKE 1 TABLET(25 MG) BY MOUTH EVERY DAY, Disp: 90 tablet, Rfl: 1    losartan (COZAAR) 50 MG tablet, Take 1 tablet (50 mg total) by mouth once daily., Disp: 90 tablet, Rfl: 1    metFORMIN (GLUCOPHAGE-XR) 750 MG ER 24hr tablet, TAKE 2 TABLETS(1500 MG) BY MOUTH DAILY WITH BREAKFAST, Disp: 180 tablet, Rfl: 1    oxyCODONE-acetaminophen (PERCOCET)  mg per tablet, Take 1 tablet by mouth every 6 (six) hours as needed for Pain., Disp: 28 tablet, Rfl: 0    tirzepatide (MOUNJARO) 7.5 mg/0.5 mL PnIj, Inject 7.5 mg into the skin once a week., Disp: 2 mL, Rfl: 2

## 2025-07-08 NOTE — PROGRESS NOTES
Patient ID: Richi Soriano is a 49 y.o. male.    Chief Complaint: Follow-up (50 yo male here for 3 month recheck. KM)    History of Present Illness    CHIEF COMPLAINT:  Mr. Soriano presents today to discuss recent lab results and medication concerns.    HPI:  Mr. Soriano reports that his atorvastatin medication was recalled, affecting his ability to take it regularly. He mentions inconsistent medication adherence due to personal stress. Mr. Soriano acknowledges difficulty maintaining his daily medication routine. He reports occasional liver discomfort, describing sensations of liver enlargement after consuming alcohol. Mr. Soriano also expresses concern about his liver function. His last elevated liver enzymes were noted in 2023, coinciding with his last abdominal imaging.    MEDICATIONS:  Mr. Soriano is on Losartan for blood pressure management, Metformin and Jardiance likely for diabetes management, and Fenofibrate for cholesterol management. Atorvastatin was discontinued due to a recall. He reports inconsistent medication adherence due to personal issues.    MEDICAL HISTORY:  Mr. Soriano has a history of fatty liver disease, hyperlipidemia, diabetes, and hypertension.    FAMILY HISTORY:  Family history is significant for the patient's child with a history of suicide attempts.    TEST RESULTS:  Mr. Soriano's recent A1C was elevated. His recent cholesterol panel showed total cholesterol within normal range, elevated triglycerides at 370, low HDL, and high LDL. His liver enzymes were recently elevated, with the last elevation occurring 2 years ago.    IMAGING:  Mr. Soriano underwent abdominal imaging in 2023, which was mentioned in the context of fatty liver.      ROS:  General: -fever, -chills, -fatigue, -weight gain, -weight loss  Eyes: -vision changes, -redness, -discharge  ENT: -ear pain, -nasal congestion, -sore throat  Cardiovascular: -chest pain, -palpitations, -lower extremity edema  Respiratory: -cough,  "-shortness of breath  Gastrointestinal: -abdominal pain, -nausea, -vomiting, -diarrhea, -constipation, -blood in stool  Genitourinary: -dysuria, -hematuria, -frequency  Musculoskeletal: -joint pain, -muscle pain  Skin: -rash, -lesion  Neurological: -headache, -dizziness, -numbness, -tingling  Psychiatric: -anxiety, -depression, -sleep difficulty             Past Medical History:   Diagnosis Date    Diabetes mellitus, type 2     Diverticulitis of colon     Hyperlipidemia     Hypertension     Sleep apnea     uses CPAP     Past Surgical History:   Procedure Laterality Date    ABDOMINAL SURGERY      APPENDECTOMY      COLON SURGERY      colon resection    COLONOSCOPY, SCREENING, HIGH RISK PATIENT N/A 11/14/2024    Procedure: COLONOSCOPY, SCREENING, HIGH RISK PATIENT;  Surgeon: Colt Phillips MD;  Location: [facility];  Service: Endoscopy;  Laterality: N/A;    HERNIA REPAIR      ventral         Tobacco History:  reports that he has quit smoking. His smoking use included cigarettes. He has a 5 pack-year smoking history. He has been exposed to tobacco smoke. He has never used smokeless tobacco.      Review of patient's allergies indicates:  No Known Allergies  Current Medications[1]           Objective:      Vitals:    07/08/25 1055   BP: 136/70   Pulse: 100   Resp: 20   Temp: 97.8 °F (36.6 °C)   TempSrc: Oral   Weight: (!) 158.1 kg (348 lb 8.8 oz)   Height: 5' 8" (1.727 m)     Physical Exam  Constitutional:       Appearance: Normal appearance. He is obese.   Cardiovascular:      Rate and Rhythm: Normal rate.      Pulses: Normal pulses.      Heart sounds: Normal heart sounds.   Pulmonary:      Effort: Pulmonary effort is normal.      Breath sounds: Normal breath sounds.   Neurological:      Mental Status: He is alert and oriented to person, place, and time. Mental status is at baseline.   Psychiatric:         Mood and Affect: Mood normal.         Behavior: Behavior normal.           Assessment:       1. Elevated liver " enzymes    2. Type 2 diabetes mellitus without complication, without long-term current use of insulin    3. Hypertriglyceridemia without hypercholesterolemia           Plan:       Assessment & Plan    K76.0 Fatty (change of) liver, not elsewhere classified  E78.2 Mixed hyperlipidemia  E11.65 Type 2 diabetes mellitus with hyperglycemia  I10 Essential (primary) hypertension  Z91.128 Patient's intentional underdosing of medication regimen for other reason  Z81.8 Family history of other mental and behavioral disorders  Z63.79 Other stressful life events affecting family and household  Z79.84 Long term (current) use of oral hypoglycemic drugs    FATTY LIVER DISEASE:  - Monitored patient's history of fatty liver with recent liver enzyme elevation.  - Last abdominal imaging was in 2023.  - Condition appears to have worsened based on recent lab values.  - Ordered abdominal ultrasound to assess current liver status.  - Temporarily discontinued fenofibrate due to potential impact on liver enzymes.    MIXED HYPERLIPIDEMIA:  - Lipid profile shows elevated triglycerides (370), low HDL, and elevated LDL, indicating a mixed pattern.  - Changed statin medication from atorvastatin (discontinued due to recall) to rosuvastatin 20mg.  - Temporarily discontinued fenofibrate due to potential impact on liver enzymes.  - Will reassess cholesterol levels in 6 months.    TYPE 2 DIABETES WITH HYPERGLYCEMIA:  - A1C remains elevated, indicating poor glycemic control.  - Continued current medication regimen including metformin and jardiance for long-term diabetes management.    ESSENTIAL HYPERTENSION:  - Blood pressure appears well-controlled on current regimen.  - Continued telmisartan.  - Advised patient to maintain current blood pressure medication regimen.    MEDICATION ADHERENCE ISSUES:  - Mr. Soriano admits to not taking medications regularly due to personal issues.  - Instructed to adhere strictly to medication regimen for the next 3  months.    FAMILY HISTORY OF MENTAL HEALTH ISSUES:  - Mr. Soriano's middle child has attempted suicide multiple times, indicating a family history of mental health issues.    STRESSFUL LIFE EVENTS:  - Mr. Soriano reports significant stressors including child's suicide attempts and work issues.    FOLLOW-UP PLAN:  - Ordered labs to be completed prior to next visit.  - Follow up in 3 months.         Elevated liver enzymes  -     US Abdomen Complete; Future; Expected date: 07/08/2025  -     Comprehensive Metabolic Panel; Future; Expected date: 10/08/2025    Type 2 diabetes mellitus without complication, without long-term current use of insulin  -     metFORMIN (GLUCOPHAGE-XR) 750 MG ER 24hr tablet; TAKE 2 TABLETS(1500 MG) BY MOUTH DAILY WITH BREAKFAST  Dispense: 180 tablet; Refill: 1  -     glipiZIDE (GLUCOTROL) 10 MG TR24; TAKE 1 TABLET(10 MG) BY MOUTH DAILY WITH BREAKFAST  Dispense: 90 tablet; Refill: 1  -     empagliflozin (JARDIANCE) 25 mg tablet; Take 1 tablet (25 mg total) by mouth once daily.  Dispense: 90 tablet; Refill: 1  -     Hemoglobin A1C; Future; Expected date: 10/08/2025    Hypertriglyceridemia without hypercholesterolemia  -     rosuvastatin (CRESTOR) 20 MG tablet; Take 1 tablet (20 mg total) by mouth once daily.  Dispense: 90 tablet; Refill: 3  -     fenofibrate 160 MG Tab; Take 1 tablet (160 mg total) by mouth once daily.  Dispense: 90 tablet; Refill: 1      Follow up in about 3 months (around 10/8/2025), or if symptoms worsen or fail to improve, for DM.        7/8/2025 Nancy Hollis NP    This note was generated with the assistance of ambient listening technology. Verbal consent was obtained by the patient and accompanying visitor(s) for the recording of patient appointment to facilitate this note. I attest to having reviewed and edited the generated note for accuracy, though some syntax or spelling errors may persist. Please contact the author of this note for any clarification.              [1]   Current Outpatient Medications:     aspirin (ECOTRIN) 81 MG EC tablet, Take 1 tablet (81 mg total) by mouth once daily., Disp: 30 tablet, Rfl: 0    HYDROcodone-acetaminophen (NORCO) 5-325 mg per tablet, Take 1 tablet by mouth every 6 (six) hours as needed for Pain., Disp: 14 tablet, Rfl: 0    losartan (COZAAR) 50 MG tablet, Take 1 tablet (50 mg total) by mouth once daily., Disp: 90 tablet, Rfl: 1    oxyCODONE-acetaminophen (PERCOCET)  mg per tablet, Take 1 tablet by mouth every 6 (six) hours as needed for Pain., Disp: 28 tablet, Rfl: 0    empagliflozin (JARDIANCE) 25 mg tablet, Take 1 tablet (25 mg total) by mouth once daily., Disp: 90 tablet, Rfl: 1    fenofibrate 160 MG Tab, Take 1 tablet (160 mg total) by mouth once daily., Disp: 90 tablet, Rfl: 1    glipiZIDE (GLUCOTROL) 10 MG TR24, TAKE 1 TABLET(10 MG) BY MOUTH DAILY WITH BREAKFAST, Disp: 90 tablet, Rfl: 1    metFORMIN (GLUCOPHAGE-XR) 750 MG ER 24hr tablet, TAKE 2 TABLETS(1500 MG) BY MOUTH DAILY WITH BREAKFAST, Disp: 180 tablet, Rfl: 1    rosuvastatin (CRESTOR) 20 MG tablet, Take 1 tablet (20 mg total) by mouth once daily., Disp: 90 tablet, Rfl: 3

## 2025-07-11 ENCOUNTER — HOSPITAL ENCOUNTER (OUTPATIENT)
Dept: RADIOLOGY | Facility: HOSPITAL | Age: 50
Discharge: HOME | End: 2025-07-11
Attending: NURSE PRACTITIONER
Payer: COMMERCIAL

## 2025-07-11 DIAGNOSIS — R74.8 ELEVATED LIVER ENZYMES: ICD-10-CM

## 2025-07-11 PROCEDURE — 76700 US EXAM ABDOM COMPLETE: CPT | Mod: TC,PO

## 2025-07-11 PROCEDURE — 76700 US EXAM ABDOM COMPLETE: CPT | Mod: 26,,, | Performed by: RADIOLOGY

## 2025-07-14 DIAGNOSIS — K76.0 HEPATIC STEATOSIS: ICD-10-CM

## 2025-07-14 DIAGNOSIS — R74.8 ELEVATED LIVER ENZYMES: Primary | ICD-10-CM

## 2025-07-15 ENCOUNTER — OFFICE VISIT (OUTPATIENT)
Dept: HEPATOLOGY | Facility: CLINIC | Age: 50
End: 2025-07-15
Payer: COMMERCIAL

## 2025-07-15 ENCOUNTER — TELEPHONE (OUTPATIENT)
Dept: HEPATOLOGY | Facility: CLINIC | Age: 50
End: 2025-07-15
Payer: COMMERCIAL

## 2025-07-15 VITALS — BODY MASS INDEX: 47.74 KG/M2 | HEIGHT: 68 IN | WEIGHT: 315 LBS

## 2025-07-15 DIAGNOSIS — R74.8 ELEVATED LIVER ENZYMES: ICD-10-CM

## 2025-07-15 DIAGNOSIS — K76.0 METABOLIC DYSFUNCTION-ASSOCIATED STEATOTIC LIVER DISEASE (MASLD): Primary | ICD-10-CM

## 2025-07-15 DIAGNOSIS — R74.8 ABNORMAL TRANSAMINASES: ICD-10-CM

## 2025-07-15 DIAGNOSIS — K76.0 HEPATIC STEATOSIS: ICD-10-CM

## 2025-07-15 PROCEDURE — 4010F ACE/ARB THERAPY RXD/TAKEN: CPT | Mod: CPTII,S$GLB,, | Performed by: PHYSICIAN ASSISTANT

## 2025-07-15 PROCEDURE — 3046F HEMOGLOBIN A1C LEVEL >9.0%: CPT | Mod: CPTII,S$GLB,, | Performed by: PHYSICIAN ASSISTANT

## 2025-07-15 PROCEDURE — 1159F MED LIST DOCD IN RCRD: CPT | Mod: CPTII,S$GLB,, | Performed by: PHYSICIAN ASSISTANT

## 2025-07-15 PROCEDURE — 99999 PR PBB SHADOW E&M-EST. PATIENT-LVL III: CPT | Mod: PBBFAC,,, | Performed by: PHYSICIAN ASSISTANT

## 2025-07-15 PROCEDURE — 1160F RVW MEDS BY RX/DR IN RCRD: CPT | Mod: CPTII,S$GLB,, | Performed by: PHYSICIAN ASSISTANT

## 2025-07-15 PROCEDURE — 99214 OFFICE O/P EST MOD 30 MIN: CPT | Mod: S$GLB,,, | Performed by: PHYSICIAN ASSISTANT

## 2025-07-15 PROCEDURE — 3008F BODY MASS INDEX DOCD: CPT | Mod: CPTII,S$GLB,, | Performed by: PHYSICIAN ASSISTANT

## 2025-07-15 RX ORDER — MULTIVITAMIN
1 TABLET ORAL DAILY
COMMUNITY

## 2025-07-15 NOTE — PROGRESS NOTES
HEPATOLOGY CLINIC VISIT NOTE   REFERRING PROVIDER: Nancy Hollis, SANIYAC  CHIEF COMPLAINT: Fatty liver    HISTORY       This is a 49 y.o. White male referred for fatty liver and elevated liver enzymes.    Prior liver disease or icteric illness: No     Per EPIC  Numerous imaging studies dating back to 2018 revealing likely hepatic steatosis  Transaminases intermittently elevated but recently more so  TBili, ALP, Plt count repeatedly normal         Risks for steatotic liver:  HLD, recently started crestor (after 7/3 liver panel was done)  DM: HbA1c 10.0 (7/3/25) - on jardiance, glipizide, glucophage  Mounjaro for few months in past. Tolerated well. D/c due to insurance.   Class 3 Obesity w/ BMI 53  Alcohol: 12-pack beer on weekends    Liver staging:  No formal staging  No obvious cirrhosis, portal HTN, or liver dysfunction.  Mild SM 14.1cm  FIB-4 0.82 (11/2024) - considered low-risk for advanced fibrosis    Screening for other liver disease:  HCVAB: neg (12/2023)  NIKHIL neg (11/2024)     Denies jaundice, dark urine, abdominal distention, hematemesis, melena, slowed mentation.       PMH, PSH, SOCIAL HX, FAMILY HX      Reviewed in Epic  Pertinent findings:  FAMILY HX: neg for liver diease, cirrhosis, HCC  SOCIAL HX: resides in [place]  Alcohol - 12-pack beer on weekends  Drugs - No      ROS: as per HPI      PHYSICAL EXAM:  Friendly White male, in no acute distress; alert and oriented to person, place and time  HEENT: Sclerae anicteric.   NECK: Supple  LUNGS: Normal respiratory effort.   ABDOMEN: Protuberant, soft, nontender.   SKIN: Warm and dry. No jaundice, No obvious rashes. (+) tattoo  EXTREMITIES: No lower extremity edema  NEURO/PSYCH: Normal gate. Memory intact. Thought and speech pattern appropriate. Behavior normal. No depression or anxiety noted.    PERTINENT DIAGNOSTIC RESULTS      Lab Results   Component Value Date    WBC 8.18 11/04/2024    HGB 13.7 (L) 11/04/2024     11/04/2024     Lab Results    Component Value Date    INR 0.9 07/30/2024     Lab Results   Component Value Date     (H) 07/03/2025     (H) 07/03/2025    BILITOT 0.5 07/03/2025    ALBUMIN 4.6 07/03/2025    ALKPHOS 96 11/03/2024    CREATININE 0.85 07/03/2025    BUN 13 07/03/2025     07/03/2025    K 5.0 07/03/2025     Results for orders placed during the hospital encounter of 07/11/25  US Abdomen Complete  CLINICAL HISTORY:  elevated liver enzymes; Abnormal levels of other serum enzymes    COMPARISON:  Abdominal ultrasound 07/11/2025.  Liver is enlarged measuring 25.4 cm.    FINDINGS:  There is increased echogenicity of the liver parenchyma felt to reflect hepatic steatosis. No intrahepatic lesion or intrahepatic bile duct dilatation observed.  Hepatopedal flow in main portal vein.    Small amount of sludge noted within the gallbladder.  No gallbladder wall thickening or pericholecystic fluid.  Common duct diameter is within normal limits.    The visualized pancreas is unremarkable, with bowel gas obscuring portions of the pancreas.    Visualized abdominal aorta is nonaneurysmal.    Juxtahepatic IVC is unremarkable.    The spleen is enlarged measuring 14.1 cm.  No other gross abnormalities of the spleen observed.    Right kidney measures 12.6 x 6.2 x 7.2 cm and left kidney measures 12.7 x 5.6 x 6.8 cm. There is no hydronephrosis, nephrolithiasis, cyst or mass of the kidneys.    Impression  1. Hepatic steatosis with hepatomegaly.  2. Splenomegaly.  3. Small amount of gallbladder sludge.      ASSESSMENT        49 y.o. White male with:  1. 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  -- RF: Obesity Classification: Class 3 (BMI >/=40), DM, HLD, Alcohol      PLAN        1. Labs   2. MR Elastography  3. Wt loss  4. Limit alcohol  5. Good control of BP, lipids, blood sugars: deferred to PCP  - recommend consideration be given to retrying GLP given DM, obesity, sleep apnea, and steatotic liver  6.  F/U visit     Orders Placed This Encounter   Procedures    MR Elastography    Hepatitis B Surface Antigen    Hepatitis C Antibody    Ferritin    Iron and TIBC    Phosphatidylethanol (PETH)    Comprehensive Metabolic Panel    Protime-INR    Enhanced Liver Fibrosis (ELF)     *will trend transaminases before determining whether add'l serology needed       Discussed significance of steatotic liver disease, steatosis vs steatohepatitis, and risk of cirrhosis.  Briefly discussed available medicines but unclear if pt is candidate for them at this time.  Discussed role of diet, exercise, maintaining ideal body weight in mngmt of steatotic liver.  Discussed importance of managing blood sugar and lipids if elevated.  Discussed importance of minimizing alcohol.    __________________________________________________________________    Duration of encounter: 36 min  This includes face-to-face time and non face-to-face time preparing to see the patient (eg, review of tests), obtaining and/or reviewing separately obtained history, documenting clinical information in the electronic or other health record, independently interpreting resultsand communicating results to the patient/family/caregiver, or care coordination.

## 2025-07-15 NOTE — TELEPHONE ENCOUNTER
GALE Ellis ordered that patient be scheduled for a hepatology consult visit for fatty liver.  I spoke with patient.  Appt with PA Scheuermann scheduled 7/15/25.

## 2025-07-22 ENCOUNTER — LAB VISIT (OUTPATIENT)
Dept: LAB | Facility: HOSPITAL | Age: 50
End: 2025-07-22
Attending: PHYSICIAN ASSISTANT
Payer: COMMERCIAL

## 2025-07-22 DIAGNOSIS — K76.0 METABOLIC DYSFUNCTION-ASSOCIATED STEATOTIC LIVER DISEASE (MASLD): ICD-10-CM

## 2025-07-22 DIAGNOSIS — R74.8 ABNORMAL TRANSAMINASES: ICD-10-CM

## 2025-07-22 DIAGNOSIS — K76.0 HEPATIC STEATOSIS: ICD-10-CM

## 2025-07-22 LAB
ALBUMIN SERPL-MCNC: 4.1 G/DL (ref 3.5–5.2)
ALP SERPL-CCNC: 125 UNIT/L (ref 40–150)
ALT SERPL-CCNC: 93 UNIT/L (ref 10–44)
ANION GAP (SMH): 12 MMOL/L (ref 8–16)
AST SERPL-CCNC: 52 UNIT/L (ref 11–45)
BILIRUB SERPL-MCNC: 0.5 MG/DL (ref 0.1–1)
BUN SERPL-MCNC: 16 MG/DL (ref 6–20)
CALCIUM SERPL-MCNC: 9.4 MG/DL (ref 8.7–10.5)
CHLORIDE SERPL-SCNC: 104 MMOL/L (ref 95–110)
CO2 SERPL-SCNC: 24 MMOL/L (ref 23–29)
CREAT SERPL-MCNC: 1.1 MG/DL (ref 0.5–1.4)
FERRITIN SERPL-MCNC: 670.5 NG/ML (ref 20–300)
GFR SERPLBLD CREATININE-BSD FMLA CKD-EPI: >60 ML/MIN/1.73/M2
GLUCOSE SERPL-MCNC: 168 MG/DL (ref 70–110)
HBV SURFACE AG SERPL QL IA: NORMAL
HCV AB SERPL QL IA: NORMAL
INR PPP: 0.9 (ref 0.8–1.2)
IRON SATN MFR SERPL: 27 % (ref 20–50)
IRON SERPL-MCNC: 103 UG/DL (ref 45–160)
POTASSIUM SERPL-SCNC: 4.2 MMOL/L (ref 3.5–5.1)
PROT SERPL-MCNC: 7.2 GM/DL (ref 6–8.4)
PROTHROMBIN TIME: 10.4 SECONDS (ref 9–12.5)
SODIUM SERPL-SCNC: 140 MMOL/L (ref 136–145)
TIBC SERPL-MCNC: 380 UG/DL (ref 250–450)
TRANSFERRIN SERPL-MCNC: 257 MG/DL (ref 200–375)

## 2025-07-22 PROCEDURE — 87340 HEPATITIS B SURFACE AG IA: CPT

## 2025-07-22 PROCEDURE — 81517 LIVER DS ALYS 3 BMRK SRM ALG: CPT

## 2025-07-22 PROCEDURE — 80053 COMPREHEN METABOLIC PANEL: CPT

## 2025-07-22 PROCEDURE — 82728 ASSAY OF FERRITIN: CPT

## 2025-07-22 PROCEDURE — 86803 HEPATITIS C AB TEST: CPT

## 2025-07-22 PROCEDURE — 80321 ALCOHOLS BIOMARKERS 1OR 2: CPT

## 2025-07-22 PROCEDURE — 83540 ASSAY OF IRON: CPT

## 2025-07-22 PROCEDURE — 36415 COLL VENOUS BLD VENIPUNCTURE: CPT

## 2025-07-22 PROCEDURE — 85610 PROTHROMBIN TIME: CPT

## 2025-07-24 LAB
LIVER FIBR SCORE SERPL CALC.FIBROSURE: 10.48
PLPETH BLD-MCNC: 85 NG/ML
POPETH BLD-MCNC: 84 NG/ML
TOXICOLOGIST REVIEW: NORMAL

## 2025-07-28 ENCOUNTER — HOSPITAL ENCOUNTER (OUTPATIENT)
Dept: RADIOLOGY | Facility: HOSPITAL | Age: 50
Discharge: HOME | End: 2025-07-28
Attending: PHYSICIAN ASSISTANT
Payer: COMMERCIAL

## 2025-07-28 DIAGNOSIS — R74.8 ABNORMAL TRANSAMINASES: ICD-10-CM

## 2025-07-28 DIAGNOSIS — K76.0 HEPATIC STEATOSIS: ICD-10-CM

## 2025-07-28 DIAGNOSIS — K76.0 METABOLIC DYSFUNCTION-ASSOCIATED STEATOTIC LIVER DISEASE (MASLD): ICD-10-CM

## 2025-07-28 PROCEDURE — 76391 MR ELASTOGRAPHY: CPT | Mod: TC,PO

## 2025-07-28 PROCEDURE — 76391 MR ELASTOGRAPHY: CPT | Mod: 26,,, | Performed by: RADIOLOGY

## 2025-08-13 ENCOUNTER — OFFICE VISIT (OUTPATIENT)
Dept: FAMILY MEDICINE | Facility: CLINIC | Age: 50
End: 2025-08-13
Payer: COMMERCIAL

## 2025-08-13 ENCOUNTER — PATIENT MESSAGE (OUTPATIENT)
Dept: FAMILY MEDICINE | Facility: CLINIC | Age: 50
End: 2025-08-13

## 2025-08-13 ENCOUNTER — PATIENT MESSAGE (OUTPATIENT)
Dept: HEPATOLOGY | Facility: CLINIC | Age: 50
End: 2025-08-13

## 2025-08-13 ENCOUNTER — TELEPHONE (OUTPATIENT)
Dept: HEPATOLOGY | Facility: CLINIC | Age: 50
End: 2025-08-13

## 2025-08-13 ENCOUNTER — OFFICE VISIT (OUTPATIENT)
Dept: HEPATOLOGY | Facility: CLINIC | Age: 50
End: 2025-08-13
Payer: COMMERCIAL

## 2025-08-13 DIAGNOSIS — K76.0 METABOLIC DYSFUNCTION-ASSOCIATED STEATOTIC LIVER DISEASE (MASLD): ICD-10-CM

## 2025-08-13 DIAGNOSIS — E27.9 ADRENAL NODULE: ICD-10-CM

## 2025-08-13 DIAGNOSIS — K76.9 LIVER LESION: ICD-10-CM

## 2025-08-13 DIAGNOSIS — E11.9 TYPE 2 DIABETES MELLITUS WITHOUT COMPLICATION, WITHOUT LONG-TERM CURRENT USE OF INSULIN: Primary | ICD-10-CM

## 2025-08-13 DIAGNOSIS — K74.02 ADVANCED HEPATIC FIBROSIS: Primary | ICD-10-CM

## 2025-08-13 DIAGNOSIS — R74.8 ABNORMAL TRANSAMINASES: ICD-10-CM

## 2025-08-13 DIAGNOSIS — R93.5 ABNORMAL MAGNETIC RESONANCE IMAGING OF ABDOMEN: ICD-10-CM

## 2025-08-13 PROCEDURE — 4010F ACE/ARB THERAPY RXD/TAKEN: CPT | Mod: CPTII,95,, | Performed by: PHYSICIAN ASSISTANT

## 2025-08-13 PROCEDURE — 3046F HEMOGLOBIN A1C LEVEL >9.0%: CPT | Mod: CPTII,95,, | Performed by: NURSE PRACTITIONER

## 2025-08-13 PROCEDURE — 1159F MED LIST DOCD IN RCRD: CPT | Mod: CPTII,95,, | Performed by: PHYSICIAN ASSISTANT

## 2025-08-13 PROCEDURE — 1160F RVW MEDS BY RX/DR IN RCRD: CPT | Mod: CPTII,95,, | Performed by: PHYSICIAN ASSISTANT

## 2025-08-13 PROCEDURE — 3046F HEMOGLOBIN A1C LEVEL >9.0%: CPT | Mod: CPTII,95,, | Performed by: PHYSICIAN ASSISTANT

## 2025-08-13 PROCEDURE — 98005 SYNCH AUDIO-VIDEO EST LOW 20: CPT | Mod: 95,,, | Performed by: NURSE PRACTITIONER

## 2025-08-13 PROCEDURE — 98007 SYNCH AUDIO-VIDEO EST HI 40: CPT | Mod: 95,,, | Performed by: PHYSICIAN ASSISTANT

## 2025-08-13 PROCEDURE — 4010F ACE/ARB THERAPY RXD/TAKEN: CPT | Mod: CPTII,95,, | Performed by: NURSE PRACTITIONER

## 2025-08-13 RX ORDER — TIRZEPATIDE 2.5 MG/.5ML
2.5 INJECTION, SOLUTION SUBCUTANEOUS
Qty: 2 ML | Refills: 5 | Status: SHIPPED | OUTPATIENT
Start: 2025-08-13 | End: 2026-02-09

## 2025-08-15 ENCOUNTER — LAB VISIT (OUTPATIENT)
Dept: LAB | Facility: HOSPITAL | Age: 50
End: 2025-08-15
Attending: PHYSICIAN ASSISTANT
Payer: COMMERCIAL

## 2025-08-15 DIAGNOSIS — K76.9 LIVER LESION: ICD-10-CM

## 2025-08-15 DIAGNOSIS — K74.02 ADVANCED HEPATIC FIBROSIS: ICD-10-CM

## 2025-08-15 PROCEDURE — 82107 ALPHA-FETOPROTEIN L3: CPT

## 2025-08-15 PROCEDURE — 36415 COLL VENOUS BLD VENIPUNCTURE: CPT

## 2025-08-18 ENCOUNTER — PATIENT MESSAGE (OUTPATIENT)
Dept: HEPATOLOGY | Facility: CLINIC | Age: 50
End: 2025-08-18
Payer: COMMERCIAL

## 2025-08-19 ENCOUNTER — PATIENT MESSAGE (OUTPATIENT)
Dept: FAMILY MEDICINE | Facility: CLINIC | Age: 50
End: 2025-08-19
Payer: COMMERCIAL

## 2025-08-19 DIAGNOSIS — E11.9 TYPE 2 DIABETES MELLITUS WITHOUT COMPLICATION, WITHOUT LONG-TERM CURRENT USE OF INSULIN: ICD-10-CM

## 2025-08-19 RX ORDER — TIRZEPATIDE 2.5 MG/.5ML
2.5 INJECTION, SOLUTION SUBCUTANEOUS
Qty: 2 ML | Refills: 5 | Status: SHIPPED | OUTPATIENT
Start: 2025-08-19 | End: 2026-02-15

## 2025-08-19 RX ORDER — TIRZEPATIDE 2.5 MG/.5ML
2.5 INJECTION, SOLUTION SUBCUTANEOUS
Qty: 2 ML | Refills: 5 | Status: SHIPPED | OUTPATIENT
Start: 2025-08-19 | End: 2025-08-19 | Stop reason: SDUPTHER

## 2025-08-21 LAB
AFP L3 MFR SERPL: NORMAL % (ref 0–9.9)
AFP SERPL-MCNC: 3.2 NG/ML (ref 0–8.4)

## 2025-08-27 ENCOUNTER — OFFICE VISIT (OUTPATIENT)
Dept: URGENT CARE | Facility: CLINIC | Age: 50
End: 2025-08-27
Payer: COMMERCIAL

## 2025-08-27 ENCOUNTER — TELEPHONE (OUTPATIENT)
Dept: HEPATOLOGY | Facility: CLINIC | Age: 50
End: 2025-08-27
Payer: COMMERCIAL

## 2025-08-27 VITALS
HEART RATE: 91 BPM | WEIGHT: 315 LBS | SYSTOLIC BLOOD PRESSURE: 101 MMHG | TEMPERATURE: 98 F | OXYGEN SATURATION: 95 % | RESPIRATION RATE: 18 BRPM | HEIGHT: 68 IN | DIASTOLIC BLOOD PRESSURE: 70 MMHG | BODY MASS INDEX: 47.74 KG/M2

## 2025-08-27 DIAGNOSIS — H10.13 ALLERGIC CONJUNCTIVITIS OF BOTH EYES: Primary | ICD-10-CM

## 2025-08-27 DIAGNOSIS — J30.9 ALLERGIC RHINITIS, UNSPECIFIED SEASONALITY, UNSPECIFIED TRIGGER: ICD-10-CM

## 2025-08-27 PROCEDURE — 99214 OFFICE O/P EST MOD 30 MIN: CPT | Mod: S$GLB,,, | Performed by: NURSE PRACTITIONER

## 2025-08-27 RX ORDER — OLOPATADINE HYDROCHLORIDE 1 MG/ML
1 SOLUTION OPHTHALMIC 2 TIMES DAILY
Qty: 5 ML | Refills: 0 | Status: SHIPPED | OUTPATIENT
Start: 2025-08-27 | End: 2025-09-10

## 2025-08-27 RX ORDER — CETIRIZINE HYDROCHLORIDE 10 MG/1
10 TABLET ORAL DAILY
Qty: 30 TABLET | Refills: 0 | Status: SHIPPED | OUTPATIENT
Start: 2025-08-27

## 2025-08-28 ENCOUNTER — PATIENT MESSAGE (OUTPATIENT)
Dept: INTERVENTIONAL RADIOLOGY/VASCULAR | Facility: HOSPITAL | Age: 50
End: 2025-08-28
Payer: COMMERCIAL

## 2025-08-28 ENCOUNTER — TELEPHONE (OUTPATIENT)
Dept: INTERVENTIONAL RADIOLOGY/VASCULAR | Facility: CLINIC | Age: 50
End: 2025-08-28
Payer: COMMERCIAL

## 2025-08-28 ENCOUNTER — LAB VISIT (OUTPATIENT)
Dept: LAB | Facility: HOSPITAL | Age: 50
End: 2025-08-28
Attending: STUDENT IN AN ORGANIZED HEALTH CARE EDUCATION/TRAINING PROGRAM
Payer: COMMERCIAL

## 2025-08-28 ENCOUNTER — OFFICE VISIT (OUTPATIENT)
Dept: ENDOCRINOLOGY | Facility: CLINIC | Age: 50
End: 2025-08-28
Payer: COMMERCIAL

## 2025-08-28 VITALS
RESPIRATION RATE: 19 BRPM | DIASTOLIC BLOOD PRESSURE: 64 MMHG | BODY MASS INDEX: 47.74 KG/M2 | TEMPERATURE: 98 F | HEART RATE: 102 BPM | HEIGHT: 68 IN | SYSTOLIC BLOOD PRESSURE: 118 MMHG | WEIGHT: 315 LBS | OXYGEN SATURATION: 95 %

## 2025-08-28 DIAGNOSIS — E11.65 TYPE 2 DIABETES MELLITUS WITH HYPERGLYCEMIA, WITHOUT LONG-TERM CURRENT USE OF INSULIN: ICD-10-CM

## 2025-08-28 DIAGNOSIS — K74.02 ADVANCED HEPATIC FIBROSIS: Primary | ICD-10-CM

## 2025-08-28 DIAGNOSIS — E27.8 ADRENAL INCIDENTALOMA: Primary | ICD-10-CM

## 2025-08-28 DIAGNOSIS — K76.0 METABOLIC DYSFUNCTION-ASSOCIATED STEATOTIC LIVER DISEASE (MASLD): ICD-10-CM

## 2025-08-28 DIAGNOSIS — E27.8 ADRENAL INCIDENTALOMA: ICD-10-CM

## 2025-08-28 PROCEDURE — 82088 ASSAY OF ALDOSTERONE: CPT

## 2025-08-28 PROCEDURE — 84244 ASSAY OF RENIN: CPT

## 2025-08-28 PROCEDURE — 99999 PR PBB SHADOW E&M-EST. PATIENT-LVL V: CPT | Mod: PBBFAC,,, | Performed by: STUDENT IN AN ORGANIZED HEALTH CARE EDUCATION/TRAINING PROGRAM

## 2025-08-28 PROCEDURE — 36415 COLL VENOUS BLD VENIPUNCTURE: CPT

## 2025-08-28 RX ORDER — DEXAMETHASONE 1 MG/1
1 TABLET ORAL ONCE
Qty: 1 TABLET | Refills: 0 | Status: SHIPPED | OUTPATIENT
Start: 2025-08-28 | End: 2025-08-28

## 2025-09-02 ENCOUNTER — LAB VISIT (OUTPATIENT)
Dept: LAB | Facility: HOSPITAL | Age: 50
End: 2025-09-02
Attending: STUDENT IN AN ORGANIZED HEALTH CARE EDUCATION/TRAINING PROGRAM
Payer: COMMERCIAL

## 2025-09-02 ENCOUNTER — RESULTS FOLLOW-UP (OUTPATIENT)
Dept: ENDOCRINOLOGY | Facility: CLINIC | Age: 50
End: 2025-09-02
Payer: COMMERCIAL

## 2025-09-02 ENCOUNTER — TELEPHONE (OUTPATIENT)
Dept: INTERVENTIONAL RADIOLOGY/VASCULAR | Facility: HOSPITAL | Age: 50
End: 2025-09-02
Payer: COMMERCIAL

## 2025-09-02 DIAGNOSIS — E27.8 ADRENAL INCIDENTALOMA: ICD-10-CM

## 2025-09-02 LAB
CORTIS SERPL-MCNC: 1.8 UG/DL (ref 4.3–22.4)
RENIN PLAS-CCNC: 15 NG/ML/H

## 2025-09-02 PROCEDURE — 36415 COLL VENOUS BLD VENIPUNCTURE: CPT

## 2025-09-02 PROCEDURE — 82533 TOTAL CORTISOL: CPT

## 2025-09-02 PROCEDURE — 80299 QUANTITATIVE ASSAY DRUG: CPT

## 2025-09-03 ENCOUNTER — TELEPHONE (OUTPATIENT)
Dept: RESEARCH | Facility: HOSPITAL | Age: 50
End: 2025-09-03
Payer: COMMERCIAL

## 2025-09-04 ENCOUNTER — HOSPITAL ENCOUNTER (OUTPATIENT)
Dept: INTERVENTIONAL RADIOLOGY/VASCULAR | Facility: HOSPITAL | Age: 50
Discharge: HOME | End: 2025-09-04
Payer: COMMERCIAL

## 2025-09-04 ENCOUNTER — RESEARCH ENCOUNTER (OUTPATIENT)
Dept: RESEARCH | Facility: HOSPITAL | Age: 50
End: 2025-09-04
Payer: COMMERCIAL

## 2025-09-04 VITALS
BODY MASS INDEX: 47.74 KG/M2 | WEIGHT: 315 LBS | TEMPERATURE: 98 F | RESPIRATION RATE: 18 BRPM | HEART RATE: 79 BPM | OXYGEN SATURATION: 97 % | HEIGHT: 68 IN | DIASTOLIC BLOOD PRESSURE: 55 MMHG | SYSTOLIC BLOOD PRESSURE: 115 MMHG

## 2025-09-04 DIAGNOSIS — K74.02 ADVANCED HEPATIC FIBROSIS: Primary | ICD-10-CM

## 2025-09-04 DIAGNOSIS — K76.0 METABOLIC DYSFUNCTION-ASSOCIATED STEATOTIC LIVER DISEASE (MASLD): ICD-10-CM

## 2025-09-04 LAB
ALDOST SERPL-MCNC: 6.3 NG/DL
POCT GLUCOSE: 99 MG/DL (ref 70–110)

## 2025-09-04 PROCEDURE — 63600175 PHARM REV CODE 636 W HCPCS: Performed by: RADIOLOGY

## 2025-09-04 PROCEDURE — 88313 SPECIAL STAINS GROUP 2: CPT | Mod: TC | Performed by: PATHOLOGY

## 2025-09-04 PROCEDURE — 75970 VASCULAR BIOPSY: CPT | Mod: TC

## 2025-09-04 PROCEDURE — 25500020 PHARM REV CODE 255

## 2025-09-04 RX ORDER — SODIUM CHLORIDE 9 MG/ML
INJECTION, SOLUTION INTRAVENOUS CONTINUOUS
Status: DISCONTINUED | OUTPATIENT
Start: 2025-09-04 | End: 2025-09-05 | Stop reason: HOSPADM

## 2025-09-04 RX ORDER — HYDROCODONE BITARTRATE AND ACETAMINOPHEN 5; 325 MG/1; MG/1
1 TABLET ORAL EVERY 4 HOURS PRN
Status: DISCONTINUED | OUTPATIENT
Start: 2025-09-04 | End: 2025-09-05 | Stop reason: HOSPADM

## 2025-09-04 RX ORDER — FENTANYL CITRATE 50 UG/ML
INJECTION, SOLUTION INTRAMUSCULAR; INTRAVENOUS
Status: COMPLETED | OUTPATIENT
Start: 2025-09-04 | End: 2025-09-04

## 2025-09-04 RX ORDER — LIDOCAINE HYDROCHLORIDE 10 MG/ML
1 INJECTION, SOLUTION EPIDURAL; INFILTRATION; INTRACAUDAL; PERINEURAL ONCE
Status: DISCONTINUED | OUTPATIENT
Start: 2025-09-04 | End: 2025-09-05 | Stop reason: HOSPADM

## 2025-09-04 RX ORDER — LIDOCAINE HYDROCHLORIDE 10 MG/ML
INJECTION, SOLUTION INFILTRATION; PERINEURAL
Status: COMPLETED | OUTPATIENT
Start: 2025-09-04 | End: 2025-09-04

## 2025-09-04 RX ADMIN — FENTANYL CITRATE 25 MCG: 50 INJECTION, SOLUTION INTRAMUSCULAR; INTRAVENOUS at 01:09

## 2025-09-04 RX ADMIN — IOHEXOL 20 ML: 300 INJECTION, SOLUTION INTRAVENOUS at 01:09

## 2025-09-04 RX ADMIN — FENTANYL CITRATE 50 MCG: 50 INJECTION, SOLUTION INTRAMUSCULAR; INTRAVENOUS at 01:09

## 2025-09-04 RX ADMIN — LIDOCAINE HYDROCHLORIDE 5 ML: 10 INJECTION, SOLUTION INFILTRATION; PERINEURAL at 01:09

## 2025-09-05 ENCOUNTER — RESULTS FOLLOW-UP (OUTPATIENT)
Dept: HEPATOLOGY | Facility: CLINIC | Age: 50
End: 2025-09-05
Payer: COMMERCIAL

## 2025-09-05 ENCOUNTER — HOSPITAL ENCOUNTER (OUTPATIENT)
Dept: RADIOLOGY | Facility: HOSPITAL | Age: 50
Discharge: HOME | End: 2025-09-05
Attending: STUDENT IN AN ORGANIZED HEALTH CARE EDUCATION/TRAINING PROGRAM
Payer: COMMERCIAL

## 2025-09-05 DIAGNOSIS — E27.8 ADRENAL INCIDENTALOMA: ICD-10-CM

## 2025-09-05 LAB
DHEA SERPL-MCNC: NORMAL
ESTROGEN SERPL-MCNC: NORMAL PG/ML
INSULIN SERPL-ACNC: NORMAL U[IU]/ML
LAB AP CLINICAL INFORMATION: NORMAL
LAB AP GROSS DESCRIPTION: NORMAL
LAB AP PERFORMING LOCATION(S): NORMAL
LAB AP REPORT FOOTNOTES: NORMAL
T3RU NFR SERPL: NORMAL %

## 2025-09-05 PROCEDURE — 74178 CT ABD&PLV WO CNTR FLWD CNTR: CPT | Mod: TC

## 2025-09-05 PROCEDURE — 25500020 PHARM REV CODE 255: Performed by: STUDENT IN AN ORGANIZED HEALTH CARE EDUCATION/TRAINING PROGRAM

## 2025-09-05 PROCEDURE — 74178 CT ABD&PLV WO CNTR FLWD CNTR: CPT | Mod: 26,,, | Performed by: RADIOLOGY

## 2025-09-05 RX ADMIN — IOHEXOL 100 ML: 350 INJECTION, SOLUTION INTRAVENOUS at 08:09

## 2025-09-06 LAB — DEXAMETHASONE SERPL-MCNC: 294 NG/DL
